# Patient Record
Sex: FEMALE | Race: ASIAN | NOT HISPANIC OR LATINO | Employment: UNEMPLOYED | URBAN - METROPOLITAN AREA
[De-identification: names, ages, dates, MRNs, and addresses within clinical notes are randomized per-mention and may not be internally consistent; named-entity substitution may affect disease eponyms.]

---

## 2017-01-18 ENCOUNTER — ALLSCRIPTS OFFICE VISIT (OUTPATIENT)
Dept: OTHER | Facility: OTHER | Age: 57
End: 2017-01-18

## 2017-02-17 ENCOUNTER — TRANSCRIBE ORDERS (OUTPATIENT)
Dept: LAB | Facility: CLINIC | Age: 57
End: 2017-02-17

## 2017-02-17 ENCOUNTER — APPOINTMENT (OUTPATIENT)
Dept: LAB | Facility: CLINIC | Age: 57
End: 2017-02-17
Payer: COMMERCIAL

## 2017-02-17 DIAGNOSIS — M79.10 MYALGIA: ICD-10-CM

## 2017-02-17 DIAGNOSIS — D50.9 IRON DEFICIENCY ANEMIA: ICD-10-CM

## 2017-02-17 DIAGNOSIS — E78.5 HYPERLIPIDEMIA: ICD-10-CM

## 2017-02-17 LAB
25(OH)D3 SERPL-MCNC: 22.2 NG/ML (ref 30–100)
ALBUMIN SERPL BCP-MCNC: 3.5 G/DL (ref 3.5–5)
ALP SERPL-CCNC: 72 U/L (ref 46–116)
ALT SERPL W P-5'-P-CCNC: 37 U/L (ref 12–78)
ANION GAP SERPL CALCULATED.3IONS-SCNC: 8 MMOL/L (ref 4–13)
AST SERPL W P-5'-P-CCNC: 17 U/L (ref 5–45)
BILIRUB SERPL-MCNC: 0.75 MG/DL (ref 0.2–1)
BUN SERPL-MCNC: 10 MG/DL (ref 5–25)
CALCIUM SERPL-MCNC: 8.8 MG/DL (ref 8.3–10.1)
CHLORIDE SERPL-SCNC: 106 MMOL/L (ref 100–108)
CHOLEST SERPL-MCNC: 223 MG/DL (ref 50–200)
CO2 SERPL-SCNC: 28 MMOL/L (ref 21–32)
CREAT SERPL-MCNC: 0.62 MG/DL (ref 0.6–1.3)
ERYTHROCYTE [DISTWIDTH] IN BLOOD BY AUTOMATED COUNT: 13.4 % (ref 11.6–15.1)
GFR SERPL CREATININE-BSD FRML MDRD: >60 ML/MIN/1.73SQ M
GLUCOSE SERPL-MCNC: 85 MG/DL (ref 65–140)
HCT VFR BLD AUTO: 41.5 % (ref 34.8–46.1)
HDLC SERPL-MCNC: 73 MG/DL (ref 40–60)
HGB BLD-MCNC: 13.8 G/DL (ref 11.5–15.4)
LDLC SERPL CALC-MCNC: 110 MG/DL (ref 0–100)
MCH RBC QN AUTO: 30.7 PG (ref 26.8–34.3)
MCHC RBC AUTO-ENTMCNC: 33.3 G/DL (ref 31.4–37.4)
MCV RBC AUTO: 92 FL (ref 82–98)
PLATELET # BLD AUTO: 225 THOUSANDS/UL (ref 149–390)
PMV BLD AUTO: 9.5 FL (ref 8.9–12.7)
POTASSIUM SERPL-SCNC: 3.4 MMOL/L (ref 3.5–5.3)
PROT SERPL-MCNC: 7.3 G/DL (ref 6.4–8.2)
RBC # BLD AUTO: 4.49 MILLION/UL (ref 3.81–5.12)
SODIUM SERPL-SCNC: 142 MMOL/L (ref 136–145)
TRIGL SERPL-MCNC: 200 MG/DL
TSH SERPL DL<=0.05 MIU/L-ACNC: 2.15 UIU/ML (ref 0.36–3.74)
WBC # BLD AUTO: 4.03 THOUSAND/UL (ref 4.31–10.16)

## 2017-02-17 PROCEDURE — 80061 LIPID PANEL: CPT

## 2017-02-17 PROCEDURE — 36415 COLL VENOUS BLD VENIPUNCTURE: CPT

## 2017-02-17 PROCEDURE — 85027 COMPLETE CBC AUTOMATED: CPT

## 2017-02-17 PROCEDURE — 82306 VITAMIN D 25 HYDROXY: CPT

## 2017-02-17 PROCEDURE — 84443 ASSAY THYROID STIM HORMONE: CPT

## 2017-02-17 PROCEDURE — 80053 COMPREHEN METABOLIC PANEL: CPT

## 2017-02-24 ENCOUNTER — ALLSCRIPTS OFFICE VISIT (OUTPATIENT)
Dept: OTHER | Facility: OTHER | Age: 57
End: 2017-02-24

## 2017-05-24 DIAGNOSIS — S82.892A OTHER FRACTURE OF LEFT LOWER LEG, INITIAL ENCOUNTER FOR CLOSED FRACTURE: ICD-10-CM

## 2017-05-24 DIAGNOSIS — E78.5 HYPERLIPIDEMIA: ICD-10-CM

## 2017-05-24 DIAGNOSIS — C50.919 MALIGNANT NEOPLASM OF FEMALE BREAST (HCC): ICD-10-CM

## 2018-01-14 VITALS
HEIGHT: 63 IN | HEART RATE: 68 BPM | DIASTOLIC BLOOD PRESSURE: 82 MMHG | BODY MASS INDEX: 23.93 KG/M2 | WEIGHT: 135.06 LBS | SYSTOLIC BLOOD PRESSURE: 132 MMHG

## 2018-04-11 ENCOUNTER — OFFICE VISIT (OUTPATIENT)
Dept: CARDIOLOGY CLINIC | Facility: CLINIC | Age: 58
End: 2018-04-11
Payer: COMMERCIAL

## 2018-04-11 VITALS
OXYGEN SATURATION: 97 % | HEIGHT: 63 IN | DIASTOLIC BLOOD PRESSURE: 86 MMHG | WEIGHT: 129.8 LBS | HEART RATE: 90 BPM | SYSTOLIC BLOOD PRESSURE: 132 MMHG | BODY MASS INDEX: 23 KG/M2

## 2018-04-11 DIAGNOSIS — I35.9 AORTIC VALVE DISEASE: ICD-10-CM

## 2018-04-11 DIAGNOSIS — R00.2 PALPITATIONS: Primary | ICD-10-CM

## 2018-04-11 PROCEDURE — 99214 OFFICE O/P EST MOD 30 MIN: CPT | Performed by: INTERNAL MEDICINE

## 2018-04-11 RX ORDER — FAMOTIDINE 20 MG/1
1 TABLET, FILM COATED ORAL DAILY
COMMUNITY
Start: 2016-11-17 | End: 2019-02-14

## 2018-04-11 RX ORDER — TAMOXIFEN CITRATE 20 MG/1
20 TABLET ORAL DAILY
Refills: 2 | COMMUNITY
Start: 2018-03-18 | End: 2019-02-14

## 2018-04-11 RX ORDER — ACETAMINOPHEN 160 MG
1 TABLET,DISINTEGRATING ORAL DAILY
COMMUNITY
Start: 2017-02-17

## 2018-04-11 NOTE — PROGRESS NOTES
Cardiology Follow Up    Alex Wood  1960  [id number]  [address] [address]    1  Palpitations  Holter monitor - 48 hour    Echo complete with contrast if indicated   2  Aortic valve disease  Echo complete with contrast if indicated     Chief Complaint   Patient presents with    Follow-up     routine         Interval History:  Patient presents for routine follow-up  He admits to intermittent palpitations with lasts seconds to minutes  She describes it as heart racing  She denies associated symptoms of shortness of breath, dizziness, lightheadedness, nausea, chest pain  However, she does relate that after episode she will feel flushed  She denies exertional symtpoms     Patient Active Problem List   Diagnosis    Aortic valve disease     Past Medical History:   Diagnosis Date    Anemia     Aortic valve disease     Breast cancer ([facility] [address] )     Cardiac murmur     Hyperlipidemia      Social History     Social History    Marital status: /Civil Union     Spouse name: N/A    Number of children: N/A    Years of education: N/A     Occupational History    Not on file       Social History Main Topics    Smoking status: Never Smoker    Smokeless tobacco: Never Used    Alcohol use Yes      Comment: social    Drug use: No    Sexual activity: Not on file     Other Topics Concern    Not on file     Social History Narrative    No narrative on file      Family History   Problem Relation Age of Onset    Hypertension Father      Past Surgical History:   Procedure Laterality Date    BREAST SURGERY         Current Outpatient Prescriptions:     Cholecalciferol (VITAMIN D3) 2000 units capsule, Take 1 capsule by mouth daily, Disp: , Rfl:     tamoxifen (NOLVADEX) 20 mg tablet, Take 20 mg by mouth daily, Disp: , Rfl: 2    famotidine (PEPCID) 20 mg tablet, Take 1 tablet by mouth daily, Disp: , Rfl:     HYDROcodone-acetaminophen (NORCO) 5-325 mg per tablet, Take 1 tablet by mouth every 6 (six) hours as needed (Not relieved by Anti-inflammatory) for up to 10 doses Max Daily Amount: 4 tablets, Disp: 10 tablet, Rfl: 0  No Known Allergies      Review of Systems:  Review of Systems   Constitutional: Negative for activity change, diaphoresis, fatigue, fever and unexpected weight change  HENT: Negative for nosebleeds and trouble swallowing  Eyes: Negative for visual disturbance  Respiratory: Negative for cough, chest tightness, shortness of breath and wheezing  Cardiovascular: Negative for chest pain, palpitations and leg swelling  Gastrointestinal: Negative for abdominal pain, anal bleeding, blood in stool and nausea  Endocrine: Negative for cold intolerance and heat intolerance  Genitourinary: Negative for decreased urine volume, frequency and hematuria  Musculoskeletal: Negative for myalgias  Skin: Negative for color change and wound  Neurological: Negative for dizziness, syncope, weakness, light-headedness and headaches  Psychiatric/Behavioral: Negative for sleep disturbance  The patient is not nervous/anxious  Physical Exam:  /86   Pulse 90   Ht 5' 3" (1 6 m)   Wt 58 9 kg (129 lb 12 8 oz)   SpO2 97%   BMI 22 99 kg/m²     Physical Exam   Constitutional: She is oriented to person, place, and time  She appears well-developed and well-nourished  No distress  HENT:   Head: Normocephalic and atraumatic  Eyes: Conjunctivae are normal  No scleral icterus  Neck: Neck supple  No JVD present  Cardiovascular: Normal rate and regular rhythm  No murmur heard  Pulmonary/Chest: Effort normal and breath sounds normal  No respiratory distress  She has no wheezes  She has no rales  Abdominal: She exhibits no distension  There is no tenderness  Musculoskeletal: She exhibits no edema  Neurological: She is alert and oriented to person, place, and time  Skin: Skin is warm and dry  No rash noted  She is not diaphoretic  Psychiatric: She has a normal mood and affect  Discussion/Summary:    Palpitations- Possible etiologies discussed  will order 48 hour holter monitor      Aortic valve disease- reported by previous cardiologist  Will order echo to evaluate     Will also obtain CBC, CMP, lipid panel as these have not been checked in the recent past    Follow-up in 1 year

## 2018-04-12 ENCOUNTER — APPOINTMENT (OUTPATIENT)
Dept: LAB | Facility: CLINIC | Age: 58
End: 2018-04-12
Payer: COMMERCIAL

## 2018-04-12 DIAGNOSIS — R00.2 PALPITATIONS: ICD-10-CM

## 2018-04-12 LAB
ALBUMIN SERPL BCP-MCNC: 3.7 G/DL (ref 3.5–5)
ALP SERPL-CCNC: 66 U/L (ref 46–116)
ALT SERPL W P-5'-P-CCNC: 48 U/L (ref 12–78)
ANION GAP SERPL CALCULATED.3IONS-SCNC: 6 MMOL/L (ref 4–13)
AST SERPL W P-5'-P-CCNC: 26 U/L (ref 5–45)
BILIRUB SERPL-MCNC: 0.57 MG/DL (ref 0.2–1)
BUN SERPL-MCNC: 12 MG/DL (ref 5–25)
CALCIUM SERPL-MCNC: 9 MG/DL
CHLORIDE SERPL-SCNC: 106 MMOL/L (ref 100–108)
CHOLEST SERPL-MCNC: 189 MG/DL (ref 50–200)
CO2 SERPL-SCNC: 28 MMOL/L (ref 21–32)
CREAT SERPL-MCNC: 0.58 MG/DL (ref 0.6–1.3)
ERYTHROCYTE [DISTWIDTH] IN BLOOD BY AUTOMATED COUNT: 13.2 % (ref 11.6–15.1)
GFR SERPL CREATININE-BSD FRML MDRD: 101 ML/MIN/1.73SQ M
GLUCOSE P FAST SERPL-MCNC: 89 MG/DL (ref 65–99)
HCT VFR BLD AUTO: 42.2 % (ref 34.8–46.1)
HDLC SERPL-MCNC: 78 MG/DL (ref 40–60)
HGB BLD-MCNC: 13.5 G/DL (ref 11.5–15.4)
LDLC SERPL CALC-MCNC: 88 MG/DL (ref 0–100)
MCH RBC QN AUTO: 30.1 PG (ref 26.8–34.3)
MCHC RBC AUTO-ENTMCNC: 32 G/DL (ref 31.4–37.4)
MCV RBC AUTO: 94 FL (ref 82–98)
NONHDLC SERPL-MCNC: 111 MG/DL
PLATELET # BLD AUTO: 268 THOUSANDS/UL (ref 149–390)
PMV BLD AUTO: 9.7 FL (ref 8.9–12.7)
POTASSIUM SERPL-SCNC: 3.5 MMOL/L (ref 3.5–5.3)
PROT SERPL-MCNC: 7.3 G/DL (ref 6.4–8.2)
RBC # BLD AUTO: 4.49 MILLION/UL (ref 3.81–5.12)
SODIUM SERPL-SCNC: 140 MMOL/L (ref 136–145)
TRIGL SERPL-MCNC: 114 MG/DL
WBC # BLD AUTO: 3.84 THOUSAND/UL (ref 4.31–10.16)

## 2018-04-12 PROCEDURE — 80053 COMPREHEN METABOLIC PANEL: CPT

## 2018-04-12 PROCEDURE — 36415 COLL VENOUS BLD VENIPUNCTURE: CPT | Performed by: INTERNAL MEDICINE

## 2018-04-12 PROCEDURE — 80061 LIPID PANEL: CPT | Performed by: INTERNAL MEDICINE

## 2018-04-12 PROCEDURE — 85027 COMPLETE CBC AUTOMATED: CPT | Performed by: INTERNAL MEDICINE

## 2019-02-14 ENCOUNTER — OFFICE VISIT (OUTPATIENT)
Dept: GASTROENTEROLOGY | Facility: CLINIC | Age: 59
End: 2019-02-14
Payer: COMMERCIAL

## 2019-02-14 ENCOUNTER — APPOINTMENT (OUTPATIENT)
Dept: LAB | Facility: CLINIC | Age: 59
End: 2019-02-14
Payer: COMMERCIAL

## 2019-02-14 VITALS
SYSTOLIC BLOOD PRESSURE: 124 MMHG | DIASTOLIC BLOOD PRESSURE: 90 MMHG | HEIGHT: 63 IN | HEART RATE: 71 BPM | BODY MASS INDEX: 23.04 KG/M2 | WEIGHT: 130 LBS

## 2019-02-14 DIAGNOSIS — R10.11 RUQ ABDOMINAL PAIN: ICD-10-CM

## 2019-02-14 DIAGNOSIS — R10.11 RUQ ABDOMINAL PAIN: Primary | ICD-10-CM

## 2019-02-14 LAB — HEMOCCULT STL QL IA: NEGATIVE

## 2019-02-14 PROCEDURE — 99214 OFFICE O/P EST MOD 30 MIN: CPT | Performed by: INTERNAL MEDICINE

## 2019-02-14 PROCEDURE — G0328 FECAL BLOOD SCRN IMMUNOASSAY: HCPCS

## 2019-02-14 NOTE — PROGRESS NOTES
[facility]      Chief Complaint: RUQ abdominal pain    HPI:  Thierry Smith is a 59 y o   female who presents for a follow-up regarding RUQ pain with negative US gallbladder  If she doesn't have her morning bowel movement, she experiences discomfort  Likewise if she moves her bowels, there is usually no discomfort  No other exacerbating or remitting factors  No positional symptomatology  No nocturnal symptomatology  No exertional symptomatology  Of note, in 2016 we scheduled her for an EGD, but she never showed  Her last colonoscopy was in 2012 and it was normal   No lower GI issues  Negative colonoscopy ion 2012  She denies hematochezia, change in bowel habits, change in stool caliber, nausea, vomiting, melena, weight loss, radiation of the RUQ pain, dysphagia  Review of Systems:   Constitutional: No fever or chills, feels well, no tiredness, no recent weight gain or weight loss  HENT: No complaints of earache, no hearing loss, no nosebleeds, no nasal discharge, no sore throat, no hoarseness  Eyes: No complaints of eye pain, no red eyes, no discharge from eyes, no itchy eyes  Cardiovascular: No complaints of slow heart rate, no fast heart rate, no chest pain, no palpitations, no leg claudication, no lower extremity edema  Respiratory: No complaints of shortness of breath, no wheezing, no cough, no SOB on exertion, no orthopnea  Gastrointestinal: As noted in HPI  Genitourinary: No complaints of dysuria, no incontinence, no hesitancy, no nocturia  Musculoskeletal: No complaints of arthralgia, no myalgias, no joint swelling or stiffness, no limb pain or swelling  Neurological: No complaints of headache, no confusion, no convulsions, no numbness or tingling, no dizziness or fainting, no limb weakness, no difficulty walking  Skin: No complaints of skin rash or skin lesions, no itching, no skin wound, no dry skin      Hematological/Lymphatic: No complaints of swollen glands, does not bleed easy  Allergic/Immunologic: No immunocompromised state  Endocrine:  No complaints of polyuria, no polydipsia  Psychiatric/Behavioral: is not suicidal, no sleep disturbances, no anxiety or depression, no change in personality, no emotional problems  Historical Information   Past Medical History:   Diagnosis Date    Anemia     Aortic valve disease     [facility] [address] [facility]     Cardiac murmur     Hyperlipidemia      Past Surgical History:   Procedure Laterality Date    BREAST SURGERY       Social History   Social History     Substance and Sexual Activity   Alcohol Use Yes    Comment: social     Social History     Substance and Sexual Activity   Drug Use No     Social History     Tobacco Use   Smoking Status Never Smoker   Smokeless Tobacco Never Used     Family History   Problem Relation Age of Onset    Hypertension Father          Current Medications: has a current medication list which includes the following prescription(s): vitamin d3, famotidine, hydrocodone-acetaminophen, and tamoxifen  Vital Signs: There were no vitals taken for this visit  Physical Exam:   Constitutional  General Appearance: No acute distress, well appearing and well nourished  Head  Normocephalic  Eyes  Conjunctivae and lids: No swelling, erythema, or discharge  Pupils and irises: Equal, round and reactive to light  Ears, Nose, Mouth, and Throat  External inspection of ears and nose: Normal  Nasal mucosa, septum and turbinates: Normal without edema or erythema/   Oropharynx: Normal with no erythema, edema, exudate or lesions  Neck  Normal range of motion  Neck supple  Cardiovascular  Auscultation of the heart: Normal rate and rhythm, normal S1 and S2 without murmurs  Examination of the extremities for edema and/or varicosities: Normal  Pulmonary/Chest  Respiratory effort: No increased work of breathing or signs of respiratory distress     Auscultation of lungs: Clear to auscultation, equal breath sounds bilaterally, no wheezes, rales, no rhonchi  Abdomen  Abdomen: Non-tender, no masses  Liver and spleen: No hepatomegaly or splenomegaly  Musculoskeletal  Gait and station: normal   Digits and Nails: normal without clubbing or cyanosis  Inspection/palpation of joints, bones, and muscles: Normal  Neurological  No nystagmus or asterixis  Skin  Skin and subcutaneous tissue: Normal without rashes or lesions  Lymphatic  Palpation of the lymph nodes in neck: No lymphadenopathy  Psychiatric  Orientation to person, place and time: Normal   Mood and affect: Normal          Labs:  Lab Results   Component Value Date    ALT 48 04/12/2018    AST 26 04/12/2018    BUN 12 04/12/2018    CALCIUM 9 0 04/12/2018     04/12/2018    CHOL 216 (H) 03/22/2016    CO2 28 04/12/2018    CREATININE 0 58 (L) 04/12/2018    HDL 78 (H) 04/12/2018    HCT 42 2 04/12/2018    HGB 13 5 04/12/2018     04/12/2018    K 3 5 04/12/2018     03/22/2016    TRIG 114 04/12/2018    WBC 3 84 (L) 04/12/2018         X-Rays & Procedures:   No orders to display           ______________________________________________________________________      Assessment & Plan:       RUQ abdominal pain              I will schedule her for an EGD and and order a FIT test  If she is positive for blood in her stool, I will perform a colonoscopy as well  I told the patient was very important to get the stool test done as soon as possible so we can determine whether colonoscopy needs to be added  She understands  Attestation:  By signing my name below, Ananya Gutierrez, attest that this documentation has been prepared under the direction and in the presence of Mars Asencio MD  Electronically Signed: Riley Mills  2/14/19     I, Mars Asencio, personally performed the services described in this documentation  All medical record entries made by the scribe were at my direction and in my presence   I have reviewed the chart and discharge instructions and agree that the record reflects my personal performance and is accurate and complete   Brenda Sanches MD  2/14/19

## 2019-02-15 PROBLEM — R10.11 RUQ PAIN: Status: ACTIVE | Noted: 2019-02-15

## 2019-02-19 ENCOUNTER — ANESTHESIA EVENT (OUTPATIENT)
Dept: PERIOP | Facility: HOSPITAL | Age: 59
End: 2019-02-19
Payer: COMMERCIAL

## 2019-02-19 ENCOUNTER — HOSPITAL ENCOUNTER (OUTPATIENT)
Facility: HOSPITAL | Age: 59
Setting detail: OUTPATIENT SURGERY
Discharge: HOME | End: 2019-02-19
Attending: INTERNAL MEDICINE | Admitting: INTERNAL MEDICINE
Payer: COMMERCIAL

## 2019-02-19 ENCOUNTER — ANESTHESIA (OUTPATIENT)
Dept: PERIOP | Facility: HOSPITAL | Age: 59
End: 2019-02-19
Payer: COMMERCIAL

## 2019-02-19 VITALS
DIASTOLIC BLOOD PRESSURE: 75 MMHG | OXYGEN SATURATION: 99 % | BODY MASS INDEX: 22.58 KG/M2 | TEMPERATURE: 98.6 F | RESPIRATION RATE: 24 BRPM | WEIGHT: 127.43 LBS | SYSTOLIC BLOOD PRESSURE: 134 MMHG | HEIGHT: 63 IN | HEART RATE: 64 BPM

## 2019-02-19 DIAGNOSIS — R10.11 RUQ PAIN: ICD-10-CM

## 2019-02-19 PROCEDURE — 88342 IMHCHEM/IMCYTCHM 1ST ANTB: CPT | Performed by: PATHOLOGY

## 2019-02-19 PROCEDURE — 45378 DIAGNOSTIC COLONOSCOPY: CPT | Performed by: INTERNAL MEDICINE

## 2019-02-19 PROCEDURE — 88305 TISSUE EXAM BY PATHOLOGIST: CPT | Performed by: PATHOLOGY

## 2019-02-19 RX ORDER — SODIUM CHLORIDE 9 MG/ML
INJECTION, SOLUTION INTRAVENOUS CONTINUOUS PRN
Status: DISCONTINUED | OUTPATIENT
Start: 2019-02-19 | End: 2019-02-19 | Stop reason: SURG

## 2019-02-19 RX ORDER — PROPOFOL 10 MG/ML
INJECTION, EMULSION INTRAVENOUS AS NEEDED
Status: DISCONTINUED | OUTPATIENT
Start: 2019-02-19 | End: 2019-02-19 | Stop reason: SURG

## 2019-02-19 RX ORDER — SODIUM CHLORIDE, SODIUM LACTATE, POTASSIUM CHLORIDE, CALCIUM CHLORIDE 600; 310; 30; 20 MG/100ML; MG/100ML; MG/100ML; MG/100ML
50 INJECTION, SOLUTION INTRAVENOUS CONTINUOUS
Status: DISCONTINUED | OUTPATIENT
Start: 2019-02-19 | End: 2019-02-19 | Stop reason: HOSPADM

## 2019-02-19 RX ORDER — LIDOCAINE HYDROCHLORIDE 10 MG/ML
INJECTION, SOLUTION EPIDURAL; INFILTRATION; INTRACAUDAL; PERINEURAL AS NEEDED
Status: DISCONTINUED | OUTPATIENT
Start: 2019-02-19 | End: 2019-02-19 | Stop reason: SURG

## 2019-02-19 RX ORDER — CHLORAL HYDRATE 500 MG
1000 CAPSULE ORAL DAILY
COMMUNITY

## 2019-02-19 RX ADMIN — PROPOFOL 20 MG: 10 INJECTION, EMULSION INTRAVENOUS at 09:10

## 2019-02-19 RX ADMIN — PROPOFOL 100 MG: 10 INJECTION, EMULSION INTRAVENOUS at 09:08

## 2019-02-19 RX ADMIN — LIDOCAINE HYDROCHLORIDE 20 MG: 10 INJECTION, SOLUTION EPIDURAL; INFILTRATION; INTRACAUDAL; PERINEURAL at 09:08

## 2019-02-19 RX ADMIN — SODIUM CHLORIDE: 0.9 INJECTION, SOLUTION INTRAVENOUS at 09:05

## 2019-02-19 RX ADMIN — SODIUM CHLORIDE, SODIUM LACTATE, POTASSIUM CHLORIDE, AND CALCIUM CHLORIDE 50 ML/HR: .6; .31; .03; .02 INJECTION, SOLUTION INTRAVENOUS at 08:35

## 2019-02-19 NOTE — ANESTHESIA POSTPROCEDURE EVALUATION
Post-Op Assessment Note    CV Status:  Stable  Pain Score: 0    Pain management: adequate     Mental Status:  Lethargic   Hydration Status:  Stable   PONV Controlled:  None   Airway Patency:  Patent and adequate   Post Op Vitals Reviewed: Yes      Staff: CRNA           /74 (02/19/19 0917)    Temp 98 6 °F (37 °C) (02/19/19 0917)    Pulse 71 (02/19/19 0917)   Resp 21 (02/19/19 0917)    SpO2 98 % (02/19/19 0917)

## 2019-02-19 NOTE — ANESTHESIA PREPROCEDURE EVALUATION
Review of Systems/Medical History  Patient summary reviewed  Chart reviewed      Cardiovascular  Exercise tolerance (METS): >4,  Hyperlipidemia,    Pulmonary  Negative pulmonary ROS        GI/Hepatic  Negative GI/hepatic ROS          Negative  ROS        Endo/Other  Negative endo/other ROS      GYN  Negative gynecology ROS          Hematology   Musculoskeletal  Negative musculoskeletal ROS        Neurology  Negative neurology ROS      Psychology   Negative psychology ROS              Physical Exam    Airway    Mallampati score: II         Dental   No notable dental hx     Cardiovascular  Rhythm: regular, Rate: normal,     Pulmonary  Pulmonary exam normal     Other Findings        Anesthesia Plan  ASA Score- 2     Anesthesia Type- IV sedation with anesthesia with ASA Monitors  Additional Monitors:   Airway Plan:         Plan Factors-Patient not instructed to abstain from smoking on day of procedure  Patient did not smoke on day of surgery  Induction- intravenous  Postoperative Plan-     Informed Consent- Anesthetic plan and risks discussed with patient  I personally reviewed this patient with the CRNA  Discussed and agreed on the Anesthesia Plan with the CRNA  Doron Sandhu

## 2019-02-19 NOTE — DISCHARGE INSTR - AVS FIRST PAGE
Postoperative Note  PATIENT NAME: Jesika Sarmiento  : 1960  MRN: [id number]  [facility] ROOM 01    Surgery Date: 2019    POST-OP DIAGNOSIS: See the impression below    SEDATION: Monitored anesthesia care, check anesthesia records    PHYSICAL EXAM:  Vitals:    19 0830   BP: 121/74   Pulse: 71   Resp: 20   Temp: 98 1 °F (36 7 °C)   SpO2: 98%     Body mass index is 22 57 kg/m²  General: NAD  Heart: S1 & S2 normal, RRR  Lungs: CTA, No rales or rhonchi  Abdomen: Soft, nontender, nondistended, good bowel sounds    CONSENT:  Informed consent was obtained for the procedure, including sedation after explaining the risks and benefits of the procedure  Risks including but not limited to bleeding, perforation, infection, aspiration were discussed in detail  Also explained about less than 100%$ sensitivity with the exam and other alternatives  DESCRIPTION:   Procedure:  Esophagogastroduodenoscopy with biopsy    Indications:  78-year-old female with right upper quadrant pain of undetermined etiology    ASA 2    Estimated blood loss: Insignificant    Premedicated with  Mac anesthesia    Patient is identified by me  She is examined prior to the procedure and found to be in stable condition  She is attached to the cardiac monitor and pulse oximeter and placed in the left lateral position  After adequate intravenous sedation the Olympus video gastroscope was inserted under direct vision into esophagus  Esophageal mucosa is completely normal throughout its length with a normal Z-line at 40 cm  The stomach is entered  The body and antrum normal   Pylorus is normal   The duodenum was cannulated into the 3rd portion and is normal   Retroversion reveals a normal GE junction fundus and cardia  Biopsies taken of the antrum body and fundus with excellent hemostasis  She tolerated the procedure well and was stable throughout      My impression:    Normal upper GI endoscopy, status post biopsy    RECOMMENDATIONS:  Follow up biopsy results in 1 week    COMPLICATIONS:  None; patient tolerated the procedure well  DISPOSITION: PACU  CONDITION: Stable    Melanie Flynn MD  2/19/2019,9:13 AM        Portions of the record may have been created with voice recognition software   Occasional wrong word or "sound a like" substitutions may have occurred due to the inherent limitations of voice recognition software   Read the chart carefully and recognize, using context, where substitutions have occurred

## 2019-02-19 NOTE — DISCHARGE INSTRUCTIONS
Upper Endoscopy   WHAT YOU NEED TO KNOW:   An upper endoscopy is also called an upper gastrointestinal (GI) endoscopy, or an esophagogastroduodenoscopy (EGD)  You may feel bloated, gassy, or have some abdominal discomfort after your procedure  Your throat may be sore for 24 to 36 hours  You may burp or pass gas from air that is still inside your body  DISCHARGE INSTRUCTIONS:   Call 911 for any of the following:   · You have sudden chest pain or trouble breathing  Seek care immediately if:   · You feel dizzy or faint  · You have trouble swallowing  · Your bowel movements are very dark or black  · Your abdomen is hard and firm and you have severe pain  · You vomit blood  Contact your healthcare provider if:   · You feel full or bloated and cannot burp or pass gas  · You have not had a bowel movement for 3 days after your procedure  · You have neck pain  · You have a fever or chills  · You have nausea or are vomiting  · You have a rash or hives  · You have questions or concerns about your endoscopy  Relieve a sore throat:  Suck on throat lozenges or crushed ice  Gargle with a small amount of warm salt water  Mix 1 teaspoon of salt and 1 cup of warm water to make salt water  Relieve gas and discomfort from bloating:  Lie on your right side with a heating pad on your abdomen  Take short walks to help pass gas  Eat small meals until bloating is relieved  Rest after your procedure: You have been given medicine to relax you  Do not  drive or make important decisions until the day after your procedure  Return to your normal activity as directed  You can usually return to work the day after your procedure  Follow up with your healthcare provider as directed:  Write down your questions so you remember to ask them during your visits     © 2017 [address] is for End User's use only and may not be sold, redistributed or otherwise used for commercial purposes  All illustrations and images included in CareNotes® are the copyrighted property of A D A M , Inc  or [employer]  The above information is an  only  It is not intended as medical advice for individual conditions or treatments  Talk to your doctor, nurse or pharmacist before following any medical regimen to see if it is safe and effective for you

## 2019-02-19 NOTE — OP NOTE
Postoperative Note  PATIENT NAME: Patrick Viveros  : 1960  MRN: [id number]  [facility] ROOM 01    Surgery Date: 2019    POST-OP DIAGNOSIS: See the impression below    SEDATION: Monitored anesthesia care, check anesthesia records    PHYSICAL EXAM:  Vitals:    19 0830   BP: 121/74   Pulse: 71   Resp: 20   Temp: 98 1 °F (36 7 °C)   SpO2: 98%     Body mass index is 22 57 kg/m²  General: NAD  Heart: S1 & S2 normal, RRR  Lungs: CTA, No rales or rhonchi  Abdomen: Soft, nontender, nondistended, good bowel sounds    CONSENT:  Informed consent was obtained for the procedure, including sedation after explaining the risks and benefits of the procedure  Risks including but not limited to bleeding, perforation, infection, aspiration were discussed in detail  Also explained about less than 100%$ sensitivity with the exam and other alternatives  DESCRIPTION:   Procedure:  Esophagogastroduodenoscopy with biopsy    Indications:  68-year-old female with right upper quadrant pain of undetermined etiology    ASA 2    Estimated blood loss: Insignificant    Premedicated with  Mac anesthesia    Patient is identified by me  She is examined prior to the procedure and found to be in stable condition  She is attached to the cardiac monitor and pulse oximeter and placed in the left lateral position  After adequate intravenous sedation the Olympus video gastroscope was inserted under direct vision into esophagus  Esophageal mucosa is completely normal throughout its length with a normal Z-line at 40 cm  The stomach is entered  The body and antrum normal   Pylorus is normal   The duodenum was cannulated into the 3rd portion and is normal   Retroversion reveals a normal GE junction fundus and cardia  Biopsies taken of the antrum body and fundus with excellent hemostasis  She tolerated the procedure well and was stable throughout      My impression:    Normal upper GI endoscopy, status post biopsy    RECOMMENDATIONS:  Follow up biopsy results in 1 week    COMPLICATIONS:  None; patient tolerated the procedure well  DISPOSITION: PACU  CONDITION: Stable    Mars Asencio MD  2/19/2019,9:13 AM        Portions of the record may have been created with voice recognition software   Occasional wrong word or "sound a like" substitutions may have occurred due to the inherent limitations of voice recognition software   Read the chart carefully and recognize, using context, where substitutions have occurred

## 2019-04-26 ENCOUNTER — OFFICE VISIT (OUTPATIENT)
Dept: OBGYN CLINIC | Facility: CLINIC | Age: 59
End: 2019-04-26
Payer: COMMERCIAL

## 2019-04-26 VITALS
WEIGHT: 132.6 LBS | DIASTOLIC BLOOD PRESSURE: 82 MMHG | SYSTOLIC BLOOD PRESSURE: 126 MMHG | HEIGHT: 63 IN | HEART RATE: 67 BPM | BODY MASS INDEX: 23.5 KG/M2

## 2019-04-26 DIAGNOSIS — M25.552 LEFT HIP PAIN: Primary | ICD-10-CM

## 2019-04-26 DIAGNOSIS — S76.012A HIP STRAIN, LEFT, INITIAL ENCOUNTER: ICD-10-CM

## 2019-04-26 PROCEDURE — 99203 OFFICE O/P NEW LOW 30 MIN: CPT | Performed by: ORTHOPAEDIC SURGERY

## 2019-05-17 ENCOUNTER — EVALUATION (OUTPATIENT)
Dept: PHYSICAL THERAPY | Facility: CLINIC | Age: 59
End: 2019-05-17
Payer: COMMERCIAL

## 2019-05-17 DIAGNOSIS — S76.012A HIP STRAIN, LEFT, INITIAL ENCOUNTER: ICD-10-CM

## 2019-05-17 PROCEDURE — G8979 MOBILITY GOAL STATUS: HCPCS | Performed by: PHYSICAL THERAPIST

## 2019-05-17 PROCEDURE — G8978 MOBILITY CURRENT STATUS: HCPCS | Performed by: PHYSICAL THERAPIST

## 2019-05-17 PROCEDURE — 97161 PT EVAL LOW COMPLEX 20 MIN: CPT | Performed by: PHYSICAL THERAPIST

## 2019-05-20 ENCOUNTER — OFFICE VISIT (OUTPATIENT)
Dept: PHYSICAL THERAPY | Facility: CLINIC | Age: 59
End: 2019-05-20
Payer: COMMERCIAL

## 2019-05-20 DIAGNOSIS — S76.012A HIP STRAIN, LEFT, INITIAL ENCOUNTER: Primary | ICD-10-CM

## 2019-05-20 PROCEDURE — 97110 THERAPEUTIC EXERCISES: CPT | Performed by: PHYSICAL THERAPIST

## 2019-05-20 PROCEDURE — 97140 MANUAL THERAPY 1/> REGIONS: CPT | Performed by: PHYSICAL THERAPIST

## 2019-05-20 PROCEDURE — 97112 NEUROMUSCULAR REEDUCATION: CPT | Performed by: PHYSICAL THERAPIST

## 2019-05-22 ENCOUNTER — APPOINTMENT (OUTPATIENT)
Dept: PHYSICAL THERAPY | Facility: CLINIC | Age: 59
End: 2019-05-22
Payer: COMMERCIAL

## 2019-05-23 ENCOUNTER — APPOINTMENT (OUTPATIENT)
Dept: PHYSICAL THERAPY | Facility: CLINIC | Age: 59
End: 2019-05-23
Payer: COMMERCIAL

## 2019-05-28 ENCOUNTER — OFFICE VISIT (OUTPATIENT)
Dept: PHYSICAL THERAPY | Facility: CLINIC | Age: 59
End: 2019-05-28
Payer: COMMERCIAL

## 2019-05-28 DIAGNOSIS — S76.012A HIP STRAIN, LEFT, INITIAL ENCOUNTER: Primary | ICD-10-CM

## 2019-05-28 PROCEDURE — 97140 MANUAL THERAPY 1/> REGIONS: CPT | Performed by: PHYSICAL THERAPIST

## 2019-05-28 PROCEDURE — 97112 NEUROMUSCULAR REEDUCATION: CPT | Performed by: PHYSICAL THERAPIST

## 2019-05-28 PROCEDURE — 97110 THERAPEUTIC EXERCISES: CPT | Performed by: PHYSICAL THERAPIST

## 2019-05-29 ENCOUNTER — OFFICE VISIT (OUTPATIENT)
Dept: PHYSICAL THERAPY | Facility: CLINIC | Age: 59
End: 2019-05-29
Payer: COMMERCIAL

## 2019-05-29 DIAGNOSIS — S76.012A HIP STRAIN, LEFT, INITIAL ENCOUNTER: Primary | ICD-10-CM

## 2019-05-29 PROCEDURE — 97110 THERAPEUTIC EXERCISES: CPT

## 2019-05-29 PROCEDURE — 97112 NEUROMUSCULAR REEDUCATION: CPT

## 2019-06-13 ENCOUNTER — OFFICE VISIT (OUTPATIENT)
Dept: PHYSICAL THERAPY | Facility: CLINIC | Age: 59
End: 2019-06-13
Payer: COMMERCIAL

## 2019-06-13 DIAGNOSIS — S76.012A HIP STRAIN, LEFT, INITIAL ENCOUNTER: Primary | ICD-10-CM

## 2019-06-13 PROCEDURE — 97112 NEUROMUSCULAR REEDUCATION: CPT | Performed by: PHYSICAL THERAPIST

## 2019-06-13 PROCEDURE — 97140 MANUAL THERAPY 1/> REGIONS: CPT | Performed by: PHYSICAL THERAPIST

## 2019-06-13 PROCEDURE — 97110 THERAPEUTIC EXERCISES: CPT | Performed by: PHYSICAL THERAPIST

## 2019-06-14 ENCOUNTER — APPOINTMENT (OUTPATIENT)
Dept: PHYSICAL THERAPY | Facility: CLINIC | Age: 59
End: 2019-06-14
Payer: COMMERCIAL

## 2019-06-20 PROCEDURE — G8978 MOBILITY CURRENT STATUS: HCPCS | Performed by: PHYSICAL THERAPIST

## 2019-06-20 PROCEDURE — G8979 MOBILITY GOAL STATUS: HCPCS | Performed by: PHYSICAL THERAPIST

## 2021-06-15 ENCOUNTER — OFFICE VISIT (OUTPATIENT)
Dept: INTERNAL MEDICINE CLINIC | Facility: CLINIC | Age: 61
End: 2021-06-15
Payer: COMMERCIAL

## 2021-06-15 VITALS
SYSTOLIC BLOOD PRESSURE: 140 MMHG | TEMPERATURE: 97.6 F | HEIGHT: 63 IN | RESPIRATION RATE: 18 BRPM | BODY MASS INDEX: 23.21 KG/M2 | DIASTOLIC BLOOD PRESSURE: 80 MMHG | WEIGHT: 131 LBS | OXYGEN SATURATION: 98 % | HEART RATE: 68 BPM

## 2021-06-15 DIAGNOSIS — E78.5 HYPERLIPIDEMIA, UNSPECIFIED HYPERLIPIDEMIA TYPE: ICD-10-CM

## 2021-06-15 DIAGNOSIS — Z13.6 ENCOUNTER FOR SCREENING FOR CARDIOVASCULAR DISORDERS: ICD-10-CM

## 2021-06-15 DIAGNOSIS — Z12.11 ENCOUNTER FOR SCREENING FOR MALIGNANT NEOPLASM OF COLON: Primary | ICD-10-CM

## 2021-06-15 DIAGNOSIS — R73.03 PREDIABETES: ICD-10-CM

## 2021-06-15 DIAGNOSIS — Z79.810 LONG-TERM CURRENT USE OF TAMOXIFEN: ICD-10-CM

## 2021-06-15 DIAGNOSIS — R00.2 PALPITATIONS: ICD-10-CM

## 2021-06-15 DIAGNOSIS — R10.9 RECURRENT ABDOMINAL PAIN: ICD-10-CM

## 2021-06-15 PROCEDURE — 3725F SCREEN DEPRESSION PERFORMED: CPT | Performed by: INTERNAL MEDICINE

## 2021-06-15 PROCEDURE — 1036F TOBACCO NON-USER: CPT | Performed by: INTERNAL MEDICINE

## 2021-06-15 PROCEDURE — 99213 OFFICE O/P EST LOW 20 MIN: CPT | Performed by: INTERNAL MEDICINE

## 2021-06-15 PROCEDURE — 3008F BODY MASS INDEX DOCD: CPT | Performed by: INTERNAL MEDICINE

## 2021-06-15 RX ORDER — TAMOXIFEN CITRATE 20 MG/1
20 TABLET ORAL 2 TIMES DAILY
COMMUNITY
End: 2022-06-16

## 2021-06-15 NOTE — PROGRESS NOTES
Assessment/Plan:    No problem-specific Assessment & Plan notes found for this encounter  There are no diagnoses linked to this encounter  Subjective:      Patient ID: Berta Drake is a 56 y o  female  HPI    The following portions of the patient's history were reviewed and updated as appropriate:   She  has a past medical history of Anemia, Aortic valve disease, Breast cancer ([address] ), Cardiac murmur, and Hyperlipidemia  She   Patient Active Problem List    Diagnosis Date Noted    RUQ pain 02/15/2019    Aortic valve disease 04/11/2018    Palpitations 04/11/2018     She  has a past surgical history that includes Breast surgery and pr esophagogastroduodenoscopy transoral diagnostic (N/A, 2/19/2019)  Her family history includes Hypertension in her father; No Known Problems in her mother  She  reports that she has never smoked  She has never used smokeless tobacco  She reports previous alcohol use  She reports that she does not use drugs  Current Outpatient Medications   Medication Sig Dispense Refill    Cholecalciferol (VITAMIN D3) 2000 units capsule Take 1 capsule by mouth daily      Omega-3 Fatty Acids (FISH OIL) 1,000 mg Take 1,000 mg by mouth daily       No current facility-administered medications for this visit  Current Outpatient Medications on File Prior to Visit   Medication Sig    Cholecalciferol (VITAMIN D3) 2000 units capsule Take 1 capsule by mouth daily    Omega-3 Fatty Acids (FISH OIL) 1,000 mg Take 1,000 mg by mouth daily     No current facility-administered medications on file prior to visit  She has No Known Allergies       Review of Systems      Objective:      /80   Pulse 68   Temp 97 6 °F (36 4 °C)   Resp 18   Ht 5' 2 5" (1 588 m)   Wt 59 4 kg (131 lb)   SpO2 98%   BMI 23 58 kg/m²          Physical Exam

## 2021-06-16 ENCOUNTER — TELEPHONE (OUTPATIENT)
Dept: ADMINISTRATIVE | Facility: OTHER | Age: 61
End: 2021-06-16

## 2021-06-16 NOTE — LETTER
2nd request    Procedure Request Form: Mammogram      Date Requested: 21  Patient: Wilson Freed  Patient : 1960   Referring Provider: Scarlet Cannon MD        Date of Procedure ______________________________       The above patient has informed us that they have completed their   most recent Mammogram at your facility  Please complete   this form and attach all corresponding procedure reports/results  Comments __________________________________________________________  ____________________________________________________________________  ____________________________________________________________________  ____________________________________________________________________    Facility Completing Procedure _________________________________________    Form Completed By (print name) _______________________________________      Signature __________________________________________________________      These reports are needed for  compliance    Please fax this completed form and a copy of the procedure report to our office located at [address] [address] as soon as possible to [phone] attention Alcira: Phone [phone]    We thank you for your assistance in treating our mutual patient

## 2021-06-16 NOTE — TELEPHONE ENCOUNTER
----- Message from Vasyl Meade sent at 6/15/2021 10:23 AM EDT -----  Regarding: mammogram  06/15/21 10:23 AM    Hello, our patient Sarah Phillips has had Mammogram completed/performed   Please assist in updating the patient chart by making an External outreach to [address] [place] [address] located in [place] The date of service is 9/2020  Thank you,  Vasyl [facility] Thompson

## 2021-06-16 NOTE — TELEPHONE ENCOUNTER
Upon review of the In Basket request and the patient's chart, initial outreach has been made via fax, please see Contacts section for details       Thank you  Darcy Stewart MA

## 2021-06-18 ENCOUNTER — APPOINTMENT (OUTPATIENT)
Dept: LAB | Facility: HOSPITAL | Age: 61
End: 2021-06-18
Attending: INTERNAL MEDICINE
Payer: COMMERCIAL

## 2021-06-18 DIAGNOSIS — E78.5 HYPERLIPIDEMIA, UNSPECIFIED HYPERLIPIDEMIA TYPE: ICD-10-CM

## 2021-06-18 DIAGNOSIS — Z13.6 ENCOUNTER FOR SCREENING FOR CARDIOVASCULAR DISORDERS: ICD-10-CM

## 2021-06-18 DIAGNOSIS — R00.2 PALPITATIONS: ICD-10-CM

## 2021-06-18 DIAGNOSIS — Z79.810 LONG-TERM CURRENT USE OF TAMOXIFEN: ICD-10-CM

## 2021-06-18 DIAGNOSIS — R73.03 PREDIABETES: ICD-10-CM

## 2021-06-18 LAB
ALBUMIN SERPL BCP-MCNC: 3.5 G/DL (ref 3.5–5)
ALP SERPL-CCNC: 69 U/L (ref 46–116)
ALT SERPL W P-5'-P-CCNC: 34 U/L (ref 12–78)
ANION GAP SERPL CALCULATED.3IONS-SCNC: 9 MMOL/L (ref 4–13)
AST SERPL W P-5'-P-CCNC: 25 U/L (ref 5–45)
BASOPHILS # BLD AUTO: 0.03 THOUSANDS/ΜL (ref 0–0.1)
BASOPHILS NFR BLD AUTO: 1 % (ref 0–1)
BILIRUB SERPL-MCNC: 0.67 MG/DL (ref 0.2–1)
BUN SERPL-MCNC: 11 MG/DL (ref 5–25)
CALCIUM SERPL-MCNC: 8.8 MG/DL (ref 8.3–10.1)
CHLORIDE SERPL-SCNC: 106 MMOL/L (ref 100–108)
CHOLEST SERPL-MCNC: 215 MG/DL (ref 50–200)
CO2 SERPL-SCNC: 27 MMOL/L (ref 21–32)
CREAT SERPL-MCNC: 0.69 MG/DL (ref 0.6–1.3)
EOSINOPHIL # BLD AUTO: 0.1 THOUSAND/ΜL (ref 0–0.61)
EOSINOPHIL NFR BLD AUTO: 2 % (ref 0–6)
ERYTHROCYTE [DISTWIDTH] IN BLOOD BY AUTOMATED COUNT: 13.2 % (ref 11.6–15.1)
EST. AVERAGE GLUCOSE BLD GHB EST-MCNC: 108 MG/DL
GFR SERPL CREATININE-BSD FRML MDRD: 94 ML/MIN/1.73SQ M
GLUCOSE P FAST SERPL-MCNC: 89 MG/DL (ref 65–99)
HBA1C MFR BLD: 5.4 %
HCT VFR BLD AUTO: 40.8 % (ref 34.8–46.1)
HDLC SERPL-MCNC: 78 MG/DL
HGB BLD-MCNC: 13 G/DL (ref 11.5–15.4)
IMM GRANULOCYTES # BLD AUTO: 0.01 THOUSAND/UL (ref 0–0.2)
IMM GRANULOCYTES NFR BLD AUTO: 0 % (ref 0–2)
LDLC SERPL CALC-MCNC: 118 MG/DL (ref 0–100)
LYMPHOCYTES # BLD AUTO: 1.74 THOUSANDS/ΜL (ref 0.6–4.47)
LYMPHOCYTES NFR BLD AUTO: 41 % (ref 14–44)
MCH RBC QN AUTO: 29.7 PG (ref 26.8–34.3)
MCHC RBC AUTO-ENTMCNC: 31.9 G/DL (ref 31.4–37.4)
MCV RBC AUTO: 93 FL (ref 82–98)
MONOCYTES # BLD AUTO: 0.26 THOUSAND/ΜL (ref 0.17–1.22)
MONOCYTES NFR BLD AUTO: 6 % (ref 4–12)
NEUTROPHILS # BLD AUTO: 2.11 THOUSANDS/ΜL (ref 1.85–7.62)
NEUTS SEG NFR BLD AUTO: 50 % (ref 43–75)
NONHDLC SERPL-MCNC: 137 MG/DL
NRBC BLD AUTO-RTO: 0 /100 WBCS
PLATELET # BLD AUTO: 248 THOUSANDS/UL (ref 149–390)
PMV BLD AUTO: 9.7 FL (ref 8.9–12.7)
POTASSIUM SERPL-SCNC: 3.7 MMOL/L (ref 3.5–5.3)
PROT SERPL-MCNC: 6.8 G/DL (ref 6.4–8.2)
RBC # BLD AUTO: 4.38 MILLION/UL (ref 3.81–5.12)
SODIUM SERPL-SCNC: 142 MMOL/L (ref 136–145)
TRIGL SERPL-MCNC: 97 MG/DL
TSH SERPL DL<=0.05 MIU/L-ACNC: 1.46 UIU/ML (ref 0.36–3.74)
WBC # BLD AUTO: 4.25 THOUSAND/UL (ref 4.31–10.16)

## 2021-06-18 PROCEDURE — 83036 HEMOGLOBIN GLYCOSYLATED A1C: CPT

## 2021-06-18 PROCEDURE — 36415 COLL VENOUS BLD VENIPUNCTURE: CPT

## 2021-06-18 PROCEDURE — 84443 ASSAY THYROID STIM HORMONE: CPT

## 2021-06-18 PROCEDURE — 80053 COMPREHEN METABOLIC PANEL: CPT

## 2021-06-18 PROCEDURE — 85025 COMPLETE CBC W/AUTO DIFF WBC: CPT

## 2021-06-18 PROCEDURE — 80061 LIPID PANEL: CPT

## 2021-06-23 NOTE — TELEPHONE ENCOUNTER
As a follow-up, a second attempt has been made for outreach via fax, please see Contacts section for details      Thank you  Darcy Stewart MA

## 2021-06-28 NOTE — TELEPHONE ENCOUNTER
As a final attempt, a third outreach has been made via telephone call  Please see Contacts section for details  This encounter will be closed and completed by end of day  Should we receive the requested information because of previous outreach attempts, the requested patient's chart will be updated appropriately  I spoke with Michelle Wolf      Thank you  Elis Damian MA

## 2021-07-06 ENCOUNTER — TELEPHONE (OUTPATIENT)
Dept: INTERNAL MEDICINE CLINIC | Facility: CLINIC | Age: 61
End: 2021-07-06

## 2021-07-06 NOTE — TELEPHONE ENCOUNTER
Dr Adarsh Thornton note indicates patient gets yearly mammogram's done in [place]   Please obtain report

## 2021-07-07 ENCOUNTER — TELEPHONE (OUTPATIENT)
Dept: ADMINISTRATIVE | Facility: OTHER | Age: 61
End: 2021-07-07

## 2021-07-07 NOTE — LETTER
3rd request    Procedure Request Form: Mammogram      Date Requested: 21  Patient: Zuleima Generous  Patient : 1960   Referring Provider: Tadeo Jones, MD        Date of Procedure ______________________________       The above patient has informed us that they have completed their   most recent Mammogram at your facility  Please complete   this form and attach all corresponding procedure reports/results  Comments __________________________________________________________  ____________________________________________________________________  ____________________________________________________________________  ____________________________________________________________________    Facility Completing Procedure _________________________________________    Form Completed By (print name) _______________________________________      Signature __________________________________________________________      These reports are needed for  compliance    Please fax this completed form and a copy of the procedure report to our office located at [place] [address] as soon as possible to [phone] attention Alcira: Phone [phone]    We thank you for your assistance in treating our mutual patient

## 2021-07-07 NOTE — LETTER
Procedure Request Form: Mammogram      Date Requested: 21  Patient: Orie Forge  Patient : 1960   Referring Provider: Lucy Humphrey, MD        Date of Procedure ______________________________       The above patient has informed us that they have completed their   most recent Mammogram at your facility  Please complete   this form and attach all corresponding procedure reports/results  Comments __________________________________________________________  ____________________________________________________________________  ____________________________________________________________________  ____________________________________________________________________    Facility Completing Procedure _________________________________________    Form Completed By (print name) _______________________________________      Signature __________________________________________________________      These reports are needed for  compliance    Please fax this completed form and a copy of the procedure report to our office located at [place] [address] as soon as possible to [phone] attention Alcira: Phone [phone]    We thank you for your assistance in treating our mutual patient

## 2021-07-07 NOTE — TELEPHONE ENCOUNTER
----- Message from Giuseppe Cadet sent at 7/6/2021  1:56 PM EDT -----  Regarding: Mammogram  07/06/21 1:56 PM    Hello, our patient Génesis Holley has had Mammogram completed/performed  Please assist in updating the patient chart by making an External outreach to [facility] facility located in [place]   The date of service is 2/2021    Thank you,  Giuseppe Cadet  [facility] Shraddha Velazquez

## 2021-07-07 NOTE — LETTER
Procedure Request Form: Mammogram      Date Requested: 21  Patient: Jose L [place]  Patient : 1960   Referring Provider: Farnaz Morgan, MD        Date of Procedure ______________________________       The above patient has informed us that they have completed their   most recent Mammogram at your facility  Please complete   this form and attach all corresponding procedure reports/results  Comments __________________________________________________________  ____________________________________________________________________  ____________________________________________________________________  ____________________________________________________________________    Facility Completing Procedure _________________________________________    Form Completed By (print name) _______________________________________      Signature __________________________________________________________      These reports are needed for  compliance    Please fax this completed form and a copy of the procedure report to our office located at [place] [address] as soon as possible to [phone] gina Eli: Phone [phone]    We thank you for your assistance in treating our mutual patient

## 2021-07-12 NOTE — TELEPHONE ENCOUNTER
Upon review of the In Basket request and the patient's chart, initial outreach has been made via telephone call  The outcome of the telephone call was a fax request form to be sent to Office, please see Contacts section for details       Thank you  Arline Quinonez MA

## 2021-07-14 NOTE — TELEPHONE ENCOUNTER
As a follow-up, a second attempt has been made for outreach via fax, please see Contacts section for details      Thank you  Christina Pruitt MA

## 2021-07-16 ENCOUNTER — OFFICE VISIT (OUTPATIENT)
Dept: GASTROENTEROLOGY | Facility: CLINIC | Age: 61
End: 2021-07-16
Payer: COMMERCIAL

## 2021-07-16 VITALS
SYSTOLIC BLOOD PRESSURE: 122 MMHG | WEIGHT: 133 LBS | BODY MASS INDEX: 23.57 KG/M2 | RESPIRATION RATE: 18 BRPM | DIASTOLIC BLOOD PRESSURE: 80 MMHG | HEIGHT: 63 IN

## 2021-07-16 DIAGNOSIS — R10.31 RIGHT LOWER QUADRANT ABDOMINAL PAIN: Primary | ICD-10-CM

## 2021-07-16 PROCEDURE — 99213 OFFICE O/P EST LOW 20 MIN: CPT | Performed by: INTERNAL MEDICINE

## 2021-07-16 PROCEDURE — 3008F BODY MASS INDEX DOCD: CPT | Performed by: INTERNAL MEDICINE

## 2021-07-16 NOTE — PROGRESS NOTES
[facility]      Chief Complaint:   Right lower quadrant pain    HPI:  Maris Collier is a 56 y o   female who presents with  Or than 1 year of right lower quadrant pain  This is mild and intermittent  No definitive exacerbating factors  It is relieved when she has a bowel movement  Patient notes that she does eat a lot of garlic and onion  Last colonoscopy 2012  No alarm symptomatology  No melena hematochezia or rectal bleeding  No weight loss fever or chills  No change in bowel habits or stool caliber  Her GERD is under excellent control with only very rare heartburn  No upper abdominal symptomatology  Review of Systems:   Constitutional: No fever or chills, feels well, no tiredness, no recent weight gain or weight loss  HENT: No complaints of earache, no hearing loss, no nosebleeds, no nasal discharge, no sore throat, no hoarseness  Eyes: No complaints of eye pain, no red eyes, no discharge from eyes, no itchy eyes  Cardiovascular: No complaints of slow heart rate, no fast heart rate, no chest pain, no palpitations, no leg claudication, no lower extremity edema  Respiratory: No complaints of shortness of breath, no wheezing, no cough, no SOB on exertion, no orthopnea  Gastrointestinal: As noted in HPI  Genitourinary: No complaints of dysuria, no incontinence, no hesitancy, no nocturia  Musculoskeletal: No complaints of arthralgia, no myalgias, no joint swelling or stiffness, no limb pain or swelling  Neurological: No complaints of headache, no confusion, no convulsions, no numbness or tingling, no dizziness or fainting, no limb weakness, no difficulty walking  Skin: No complaints of skin rash or skin lesions, no itching, no skin wound, no dry skin  Hematological/Lymphatic: No complaints of swollen glands, does not bleed easy  Allergic/Immunologic: No immunocompromised state  Endocrine:  No complaints of polyuria, no polydipsia  Psychiatric/Behavioral: is not suicidal, no sleep disturbances, no anxiety or depression, no change in personality, no emotional problems  Historical Information   Past Medical History:   Diagnosis Date    Anemia     Aortic valve disease     Breast cancer ([address] )     Cardiac murmur     Hyperlipidemia      Past Surgical History:   Procedure Laterality Date    BREAST SURGERY      lumpectomy    [place] ESOPHAGOGASTRODUODENOSCOPY TRANSORAL DIAGNOSTIC N/A 2/19/2019    Procedure: ESOPHAGOGASTRODUODENOSCOPY (EGD); Surgeon: Mouna Stephen MD;  Location: [facility]; Service: Gastroenterology     Social History   Social History     Substance and Sexual Activity   Alcohol Use Not Currently    Comment: social     Social History     Substance and Sexual Activity   Drug Use No     Social History     Tobacco Use   Smoking Status Never Smoker   Smokeless Tobacco Never Used     Family History   Problem Relation Age of Onset    Hypertension Father     No Known Problems Mother          Current Medications: has a current medication list which includes the following prescription(s): vitamin d3, fish oil, and tamoxifen  Vital Signs: /80   Resp 18   Ht 5' 2 5" (1 588 m)   Wt 60 3 kg (133 lb)   BMI 23 94 kg/m²       Physical Exam:   Constitutional  General Appearance: No acute distress, well appearing and well nourished  Head  Normocephalic  Eyes  Conjunctivae and lids: No swelling, erythema, or discharge  Pupils and irises: Equal, round and reactive to light  Ears, Nose, Mouth, and Throat  External inspection of ears and nose: Normal  Nasal mucosa, septum and turbinates: Normal without edema or erythema/   Oropharynx: Normal with no erythema, edema, exudate or lesions  Neck  Normal range of motion  Neck supple  Cardiovascular  Auscultation of the heart: Normal rate and rhythm, normal S1 and S2 without murmurs    Examination of the extremities for edema and/or varicosities: Normal  Pulmonary/Chest  Respiratory effort: No increased work of breathing or signs of respiratory distress  Auscultation of lungs: Clear to auscultation, equal breath sounds bilaterally, no wheezes, rales, no rhonchi  Abdomen  Abdomen: Non-tender, no masses  Liver and spleen: No hepatomegaly or splenomegaly  Musculoskeletal  Gait and station: normal   Digits and Nails: normal without clubbing or cyanosis  Inspection/palpation of joints, bones, and muscles: Normal  Neurological  No nystagmus or asterixis  Skin  Skin and subcutaneous tissue: Normal without rashes or lesions  Lymphatic  Palpation of the lymph nodes in neck: No lymphadenopathy  Psychiatric  Orientation to person, place and time: Normal   Mood and affect: Normal          Labs:  Lab Results   Component Value Date    ALT 34 06/18/2021    AST 25 06/18/2021    BUN 11 06/18/2021    CALCIUM 8 8 06/18/2021     06/18/2021    CHOL 216 (H) 03/22/2016    CO2 27 06/18/2021    CREATININE 0 69 06/18/2021    HDL 78 06/18/2021    HCT 40 8 06/18/2021    HGB 13 0 06/18/2021    HGBA1C 5 4 06/18/2021     06/18/2021    K 3 7 06/18/2021     03/22/2016    TRIG 97 06/18/2021    WBC 4 25 (L) 06/18/2021         X-Rays & Procedures:   No orders to display           ______________________________________________________________________      Assessment & Plan:     Diagnoses and all orders for this visit:    Right lower quadrant abdominal pain  -     Colonoscopy; Future        Patient will be scheduled for colonoscopy  Further recommendations will depend on study results

## 2021-07-21 NOTE — TELEPHONE ENCOUNTER
As a final attempt, a third outreach has been made via fax  Please see Contacts section for details  This encounter will be closed and completed by end of day  Should we receive the requested information because of previous outreach attempts, the requested patient's chart will be updated appropriately       Thank you  Jewels Santiago MA

## 2021-08-27 ENCOUNTER — TELEPHONE (OUTPATIENT)
Dept: GASTROENTEROLOGY | Facility: HOSPITAL | Age: 61
End: 2021-08-27

## 2021-08-30 ENCOUNTER — ANESTHESIA (OUTPATIENT)
Dept: GASTROENTEROLOGY | Facility: HOSPITAL | Age: 61
End: 2021-08-30

## 2021-08-30 ENCOUNTER — ANESTHESIA EVENT (OUTPATIENT)
Dept: GASTROENTEROLOGY | Facility: HOSPITAL | Age: 61
End: 2021-08-30

## 2021-08-30 ENCOUNTER — HOSPITAL ENCOUNTER (OUTPATIENT)
Dept: GASTROENTEROLOGY | Facility: HOSPITAL | Age: 61
Setting detail: OUTPATIENT SURGERY
Discharge: HOME | End: 2021-08-30
Attending: INTERNAL MEDICINE | Admitting: INTERNAL MEDICINE
Payer: COMMERCIAL

## 2021-08-30 VITALS
BODY MASS INDEX: 22.58 KG/M2 | SYSTOLIC BLOOD PRESSURE: 123 MMHG | TEMPERATURE: 98.2 F | DIASTOLIC BLOOD PRESSURE: 82 MMHG | OXYGEN SATURATION: 98 % | WEIGHT: 127.43 LBS | HEIGHT: 63 IN | HEART RATE: 63 BPM | RESPIRATION RATE: 16 BRPM

## 2021-08-30 DIAGNOSIS — R10.31 RIGHT LOWER QUADRANT ABDOMINAL PAIN: ICD-10-CM

## 2021-08-30 PROCEDURE — 88305 TISSUE EXAM BY PATHOLOGIST: CPT | Performed by: PATHOLOGY

## 2021-08-30 PROCEDURE — 45380 COLONOSCOPY AND BIOPSY: CPT | Performed by: INTERNAL MEDICINE

## 2021-08-30 RX ORDER — PROPOFOL 10 MG/ML
INJECTION, EMULSION INTRAVENOUS AS NEEDED
Status: DISCONTINUED | OUTPATIENT
Start: 2021-08-30 | End: 2021-08-30

## 2021-08-30 RX ORDER — LIDOCAINE HYDROCHLORIDE 20 MG/ML
INJECTION, SOLUTION EPIDURAL; INFILTRATION; INTRACAUDAL; PERINEURAL AS NEEDED
Status: DISCONTINUED | OUTPATIENT
Start: 2021-08-30 | End: 2021-08-30

## 2021-08-30 RX ORDER — SODIUM CHLORIDE, SODIUM LACTATE, POTASSIUM CHLORIDE, CALCIUM CHLORIDE 600; 310; 30; 20 MG/100ML; MG/100ML; MG/100ML; MG/100ML
125 INJECTION, SOLUTION INTRAVENOUS CONTINUOUS
Status: DISCONTINUED | OUTPATIENT
Start: 2021-08-30 | End: 2021-09-03 | Stop reason: HOSPADM

## 2021-08-30 RX ADMIN — PROPOFOL 30 MG: 10 INJECTION, EMULSION INTRAVENOUS at 08:14

## 2021-08-30 RX ADMIN — LIDOCAINE HYDROCHLORIDE 100 MG: 20 INJECTION, SOLUTION EPIDURAL; INFILTRATION; INTRACAUDAL; PERINEURAL at 08:09

## 2021-08-30 RX ADMIN — PROPOFOL 130 MG: 10 INJECTION, EMULSION INTRAVENOUS at 08:09

## 2021-08-30 RX ADMIN — PROPOFOL 30 MG: 10 INJECTION, EMULSION INTRAVENOUS at 08:11

## 2021-08-30 RX ADMIN — PROPOFOL 30 MG: 10 INJECTION, EMULSION INTRAVENOUS at 08:17

## 2021-08-30 RX ADMIN — SODIUM CHLORIDE, SODIUM LACTATE, POTASSIUM CHLORIDE, AND CALCIUM CHLORIDE 125 ML/HR: .6; .31; .03; .02 INJECTION, SOLUTION INTRAVENOUS at 07:24

## 2021-08-30 NOTE — ANESTHESIA POSTPROCEDURE EVALUATION
Post-Op Assessment Note    CV Status:  Stable  Pain Score: 0       Mental Status:  Sleepy   Hydration Status:  Stable   PONV Controlled:  Controlled   Airway Patency:  Patent and adequate   Two or more mitigation strategies used for obstructive sleep apnea   Post Op Vitals Reviewed: Yes      Staff: CRNA         No complications documented      BP     Temp      Pulse     Resp      SpO2

## 2021-08-30 NOTE — H&P
History and Physical - [facility]  Pat Angelucci 56 y o  female MRN: [id number]                  HPI: Pat Angelucci is a 56y o  year old female who presents for colonoscopy for persistent right lower quadrant pain  Last colonoscopy 9 years ago      REVIEW OF SYSTEMS: Per the HPI, and otherwise unremarkable  Historical Information   Past Medical History:   Diagnosis Date    Anemia     Aortic valve disease     Breast cancer ([address]     Cardiac murmur     Hyperlipidemia      Past Surgical History:   Procedure Laterality Date    BREAST SURGERY      lumpectomy    [place] ESOPHAGOGASTRODUODENOSCOPY TRANSORAL DIAGNOSTIC N/A 2/19/2019    Procedure: ESOPHAGOGASTRODUODENOSCOPY (EGD); Surgeon: Graciela Coffey MD;  Location: [facility]; Service: Gastroenterology     Social History   Social History     Substance and Sexual Activity   Alcohol Use Not Currently    Comment: social     Social History     Substance and Sexual Activity   Drug Use No     Social History     Tobacco Use   Smoking Status Never Smoker   Smokeless Tobacco Never Used     Family History   Problem Relation Age of Onset    Hypertension Father     No Known Problems Mother        Meds/Allergies     (Not in a hospital admission)      No Known Allergies    Objective     There were no vitals taken for this visit        PHYSICAL EXAM    Gen: NAD  CV: RRR  CHEST: Clear  ABD: soft, NT/ND  EXT: no edema  Neuro: AAO      ASSESSMENT/PLAN:  This is a 56y o  year old female here for right lower quadrant pain    PLAN:   Procedure:  Colonoscopy

## 2021-08-30 NOTE — ANESTHESIA PREPROCEDURE EVALUATION
Procedure:  COLONOSCOPY    Relevant Problems   No relevant active problems        Physical Exam    Airway    Mallampati score: III  TM Distance: >3 FB  Neck ROM: full     Dental   No notable dental hx     Cardiovascular  Rhythm: regular, Rate: normal, No murmur,     Pulmonary  Pulmonary exam normal Breath sounds clear to auscultation,     Other Findings  History of heart murmer  No ECHO on chart  No murmur heard on auscultation today      Anesthesia Plan  ASA Score- 3     Anesthesia Type- IV sedation with anesthesia with ASA Monitors  Additional Monitors:   Airway Plan:           Plan Factors-Exercise tolerance (METS): >4 METS  Chart reviewed  Patient is not a current smoker  Patient instructed to abstain from smoking on day of procedure  Patient did not smoke on day of surgery  There is medical exclusion for perioperative obstructive sleep apnea risk education  Induction- intravenous  Postoperative Plan-     Informed Consent- Anesthetic plan and risks discussed with patient  I personally reviewed this patient with the CRNA  Discussed and agreed on the Anesthesia Plan with the CRNA  Caitlin Araya

## 2021-08-30 NOTE — INTERVAL H&P NOTE
H&P reviewed  After examining the patient I find no changes in the patients condition since the H&P had been written      Vitals:    08/30/21 0717   BP: (!) 175/83   Pulse: 67   Resp: 16   Temp: 97 6 °F (36 4 °C)   SpO2: 99%

## 2021-10-01 ENCOUNTER — TELEPHONE (OUTPATIENT)
Dept: ADMINISTRATIVE | Facility: OTHER | Age: 61
End: 2021-10-01

## 2021-10-11 ENCOUNTER — RA CDI HCC (OUTPATIENT)
Dept: OTHER | Facility: HOSPITAL | Age: 61
End: 2021-10-11

## 2022-01-18 DIAGNOSIS — Z12.31 SCREENING MAMMOGRAM FOR BREAST CANCER: Primary | ICD-10-CM

## 2022-06-09 ENCOUNTER — RA CDI HCC (OUTPATIENT)
Dept: OTHER | Facility: HOSPITAL | Age: 62
End: 2022-06-09

## 2022-06-09 NOTE — PROGRESS NOTES
[facility]  coding opportunities       Chart reviewed, no opportunity found: CHART REVIEWED, NO OPPORTUNITY FOUND        Patients Insurance        Commercial Insurance: Brittany Enriquez

## 2022-06-16 ENCOUNTER — OFFICE VISIT (OUTPATIENT)
Dept: INTERNAL MEDICINE CLINIC | Facility: CLINIC | Age: 62
End: 2022-06-16
Payer: COMMERCIAL

## 2022-06-16 VITALS
RESPIRATION RATE: 14 BRPM | SYSTOLIC BLOOD PRESSURE: 132 MMHG | BODY MASS INDEX: 23.04 KG/M2 | DIASTOLIC BLOOD PRESSURE: 88 MMHG | HEART RATE: 66 BPM | HEIGHT: 63 IN | OXYGEN SATURATION: 97 % | WEIGHT: 130 LBS

## 2022-06-16 DIAGNOSIS — Z11.4 SCREENING FOR HIV WITHOUT PRESENCE OF RISK FACTORS: ICD-10-CM

## 2022-06-16 DIAGNOSIS — E78.5 HYPERLIPIDEMIA, UNSPECIFIED HYPERLIPIDEMIA TYPE: Primary | ICD-10-CM

## 2022-06-16 DIAGNOSIS — Z00.00 ANNUAL PHYSICAL EXAM: ICD-10-CM

## 2022-06-16 DIAGNOSIS — R73.03 PREDIABETES: ICD-10-CM

## 2022-06-16 DIAGNOSIS — R53.83 OTHER FATIGUE: ICD-10-CM

## 2022-06-16 DIAGNOSIS — Z11.59 ENCOUNTER FOR HEPATITIS C SCREENING TEST FOR LOW RISK PATIENT: ICD-10-CM

## 2022-06-16 PROCEDURE — 99214 OFFICE O/P EST MOD 30 MIN: CPT | Performed by: INTERNAL MEDICINE

## 2022-06-16 PROCEDURE — 3008F BODY MASS INDEX DOCD: CPT | Performed by: INTERNAL MEDICINE

## 2022-06-16 PROCEDURE — 3725F SCREEN DEPRESSION PERFORMED: CPT | Performed by: INTERNAL MEDICINE

## 2022-06-16 PROCEDURE — 99396 PREV VISIT EST AGE 40-64: CPT | Performed by: INTERNAL MEDICINE

## 2022-06-16 PROCEDURE — 1036F TOBACCO NON-USER: CPT | Performed by: INTERNAL MEDICINE

## 2022-06-16 NOTE — PROGRESS NOTES
Assessment/Plan:     Alem Joseph is here for annual physical  She is doing well  She has a PMH for HLD and stage 1 breast cancer  She was previously taking tamoxifen but has completed it  She denies any issues today except for some fatigue  She would like to get labs done  She has a mammogram scheduled for September  Will be seeing GYN soon  She denies any c/o cp, sob  Obtain labs  Quality Measures:     Depression Screening and Follow-up Plan: Patient was screened for depression during today's encounter  They screened negative with a PHQ-2 score of 0  Return in about 6 months (around 12/16/2022)  No problem-specific Assessment & Plan notes found for this encounter  Diagnoses and all orders for this visit:    Hyperlipidemia, unspecified hyperlipidemia type  -     CBC and differential; Future  -     Comprehensive metabolic panel; Future  -     Hemoglobin A1C; Future  -     Lipid Panel with Direct LDL reflex; Future    Prediabetes  -     Hemoglobin A1C; Future  -     TSH, 3rd generation with Free T4 reflex; Future    Screening for HIV without presence of risk factors  -     HIV 1/2 Antigen/Antibody (4th Generation) w Reflex [facility]; Future    Encounter for hepatitis C screening test for low risk patient  -     Hepatitis C antibody; Future    Other fatigue  -     Vitamin D 25 hydroxy; Future    Annual physical exam          Subjective:      Patient ID: Odalys Pan is a 59 y o  female      Alem Joseph is here for physical       ALLERGIES:  No Known Allergies    CURRENT MEDICATIONS:    Current Outpatient Medications:     Cholecalciferol (VITAMIN D3) 2000 units capsule, Take 1 capsule by mouth daily, Disp: , Rfl:     Omega-3 Fatty Acids (FISH OIL) 1,000 mg, Take 1,000 mg by mouth daily, Disp: , Rfl:     ACTIVE PROBLEM LIST:  Patient Active Problem List   Diagnosis    Aortic valve disease    Palpitations    RUQ pain       PAST MEDICAL HISTORY:  Past Medical History:   Diagnosis Date    Anemia     Aortic valve disease     [address] [facility] [address] [address]     Cardiac murmur     Hyperlipidemia        PAST SURGICAL HISTORY:  Past Surgical History:   Procedure Laterality Date    BREAST SURGERY      lumpectomy    COLONOSCOPY      [place] ESOPHAGOGASTRODUODENOSCOPY TRANSORAL DIAGNOSTIC N/A 2/19/2019    Procedure: ESOPHAGOGASTRODUODENOSCOPY (EGD); Surgeon: Beto Morgan MD;  Location: [facility]; Service: Gastroenterology       FAMILY HISTORY:  Family History   Problem Relation Age of Onset    No Known Problems Mother     Hypertension Father        SOCIAL HISTORY:  Social History     Socioeconomic History    Marital status: /Civil Union     Spouse name: Not on file    Number of children: Not on file    Years of education: Not on file    Highest education level: Not on file   Occupational History    Not on file   Tobacco Use    Smoking status: Never Smoker    Smokeless tobacco: Never Used   Vaping Use    Vaping Use: Never used   Substance and Sexual Activity    Alcohol use: Not Currently     Comment: social    Drug use: No    Sexual activity: Yes   Other Topics Concern    Not on file   Social History Narrative    Not on file     Social Determinants of Health     Financial Resource Strain: Not on file   Food Insecurity: Not on file   Transportation Needs: Not on file   Physical Activity: Not on file   Stress: Not on file   Social Connections: Not on file   Intimate Partner Violence: Not on file   Housing Stability: Not on file       Review of Systems   Constitutional: Positive for fatigue  All other systems reviewed and are negative  Objective:  Vitals:    06/16/22 0940   BP: 132/88   BP Location: Left arm   Patient Position: Sitting   Pulse: 66   Resp: 14   SpO2: 97%   Weight: 59 kg (130 lb)   Height: 5' 3" (1 6 m)     Body mass index is 23 03 kg/m²  Physical Exam  Vitals and nursing note reviewed  Constitutional:       Appearance: Normal appearance  She is normal weight     HENT:      Head: Normocephalic and atraumatic  Nose: Nose normal    Cardiovascular:      Rate and Rhythm: Normal rate  Heart sounds: Normal heart sounds  Pulmonary:      Effort: Pulmonary effort is normal       Breath sounds: Normal breath sounds  Abdominal:      Palpations: Abdomen is soft  Musculoskeletal:         General: Normal range of motion  Cervical back: Normal range of motion  Right lower leg: No edema  Left lower leg: No edema  Skin:     General: Skin is warm and dry  Neurological:      General: No focal deficit present  Mental Status: She is alert and oriented to person, place, and time  Mental status is at baseline  Psychiatric:         Mood and Affect: Mood normal            RESULTS:    No results found for this or any previous visit (from the past 1008 hour(s))  This note was created with voice recognition software  Phonic, grammatical and spelling errors may be present within the note as a result

## 2022-06-16 NOTE — PROGRESS NOTES
[facility] [place]    NAME: Андрей Rodgers  AGE: 59 y o  SEX: female  : 1960     DATE: 2022     Assessment and Plan:     Problem List Items Addressed This Visit    None     Visit Diagnoses     Hyperlipidemia, unspecified hyperlipidemia type    -  Primary    Relevant Orders    CBC and differential    Comprehensive metabolic panel    Hemoglobin A1C    Lipid Panel with Direct LDL reflex    Prediabetes        Relevant Orders    Hemoglobin A1C    TSH, 3rd generation with Free T4 reflex    Screening for HIV without presence of risk factors        Relevant Orders    HIV 1/2 Antigen/Antibody (4th Generation) w Reflex [facility]    Encounter for hepatitis C screening test for low risk patient        Relevant Orders    Hepatitis C antibody    Other fatigue        Relevant Orders    Vitamin D 25 hydroxy    Annual physical exam              Immunizations and preventive care screenings were discussed with patient today  Appropriate education was printed on patient's after visit summary  Counseling:  Alcohol/drug use: discussed moderation in alcohol intake, the recommendations for healthy alcohol use, and avoidance of illicit drug use  Dental Health: discussed importance of regular tooth brushing, flossing, and dental visits  Injury prevention: discussed safety/seat belts, safety helmets, smoke detectors, carbon dioxide detectors, and smoking near bedding or upholstery  Sexual health: discussed sexually transmitted diseases, partner selection, use of condoms, avoidance of unintended pregnancy, and contraceptive alternatives  · Exercise: the importance of regular exercise/physical activity was discussed  Recommend exercise 3-5 times per week for at least 30 minutes  Return in about 6 months (around 2022)       Chief Complaint:     Chief Complaint   Patient presents with    Annual Exam      History of Present Illness: Adult Annual Physical   Patient here for a comprehensive physical exam  The patient reports problems - fatigue  Diet and Physical Activity  · Diet/Nutrition: well balanced diet  · Exercise: walking  Depression Screening  PHQ-2/9 Depression Screening    Little interest or pleasure in doing things: 0 - not at all  Feeling down, depressed, or hopeless: 0 - not at all  PHQ-2 Score: 0  PHQ-2 Interpretation: Negative depression screen       General Health  · Sleep: sleeps well  · Hearing: normal - bilateral   · Vision: no vision problems  · Dental: regular dental visits  /GYN Health  · Patient is: postmenopausal  · Last menstrual period: n/a  · Contraceptive method: n/a  Review of Systems:     Review of Systems   Past Medical History:     Past Medical History:   Diagnosis Date    Anemia     Aortic valve disease     Breast cancer ([address] )     Cardiac murmur     Hyperlipidemia       Past Surgical History:     Past Surgical History:   Procedure Laterality Date    BREAST SURGERY      lumpectomy    COLONOSCOPY      [place] ESOPHAGOGASTRODUODENOSCOPY TRANSORAL DIAGNOSTIC N/A 2/19/2019    Procedure: ESOPHAGOGASTRODUODENOSCOPY (EGD); Surgeon: Jordy Tafoya MD;  Location: [facility];   Service: Gastroenterology      Social History:     Social History     Socioeconomic History    Marital status: /Civil Union     Spouse name: None    Number of children: None    Years of education: None    Highest education level: None   Occupational History    None   Tobacco Use    Smoking status: Never Smoker    Smokeless tobacco: Never Used   Vaping Use    Vaping Use: Never used   Substance and Sexual Activity    Alcohol use: Not Currently     Comment: social    Drug use: No    Sexual activity: Yes   Other Topics Concern    None   Social History Narrative    None     Social Determinants of Health     Financial Resource Strain: Not on file   Food Insecurity: Not on file   Transportation Needs: Not on file   Physical Activity: Not on file   Stress: Not on file   Social Connections: Not on file   Intimate Partner Violence: Not on file   Housing Stability: Not on file      Family History:     Family History   Problem Relation Age of Onset    No Known Problems Mother     Hypertension Father       Current Medications:     Current Outpatient Medications   Medication Sig Dispense Refill    Cholecalciferol (VITAMIN D3) 2000 units capsule Take 1 capsule by mouth daily      Omega-3 Fatty Acids (FISH OIL) 1,000 mg Take 1,000 mg by mouth daily       No current facility-administered medications for this visit        Allergies:     No Known Allergies   Physical Exam:     /88 (BP Location: Left arm, Patient Position: Sitting)   Pulse 66   Resp 14   Ht 5' 3" (1 6 m)   Wt 59 kg (130 lb)   SpO2 97%   BMI 23 03 kg/m²     Physical Exam     Karel Weiss MD  [facility] [place]

## 2022-06-16 NOTE — PATIENT INSTRUCTIONS

## 2022-06-16 NOTE — PROGRESS NOTES
Assessment/Plan:      Quality Measures:       Return in about 6 months (around 12/16/2022)  No problem-specific Assessment & Plan notes found for this encounter  Diagnoses and all orders for this visit:    Hyperlipidemia, unspecified hyperlipidemia type  -     CBC and differential; Future  -     Comprehensive metabolic panel; Future  -     Hemoglobin A1C; Future  -     Lipid Panel with Direct LDL reflex; Future    Prediabetes  -     Hemoglobin A1C; Future  -     TSH, 3rd generation with Free T4 reflex; Future    Screening for HIV without presence of risk factors  -     HIV 1/2 Antigen/Antibody (4th Generation) w Reflex [facility]; Future    Encounter for hepatitis C screening test for low risk patient  -     Hepatitis C antibody; Future    Other fatigue  -     Vitamin D 25 hydroxy; Future          Subjective:      Patient ID: Gucci Murphy is a 59 y o  female  HPI    ALLERGIES:  No Known Allergies    CURRENT MEDICATIONS:    Current Outpatient Medications:     Cholecalciferol (VITAMIN D3) 2000 units capsule, Take 1 capsule by mouth daily, Disp: , Rfl:     Omega-3 Fatty Acids (FISH OIL) 1,000 mg, Take 1,000 mg by mouth daily, Disp: , Rfl:     ACTIVE PROBLEM LIST:  Patient Active Problem List   Diagnosis    Aortic valve disease    Palpitations    RUQ pain       PAST MEDICAL HISTORY:  Past Medical History:   Diagnosis Date    Anemia     Aortic valve disease     Breast cancer ([facility] [address] )     Cardiac murmur     Hyperlipidemia        PAST SURGICAL HISTORY:  Past Surgical History:   Procedure Laterality Date    BREAST SURGERY      lumpectomy    COLONOSCOPY      [place] ESOPHAGOGASTRODUODENOSCOPY TRANSORAL DIAGNOSTIC N/A 2/19/2019    Procedure: ESOPHAGOGASTRODUODENOSCOPY (EGD); Surgeon: Rodríguez Cade MD;  Location: [facility];   Service: Gastroenterology       FAMILY HISTORY:  Family History   Problem Relation Age of Onset    No Known Problems Mother     Hypertension Father        SOCIAL HISTORY:  Social History Socioeconomic History    Marital status: /Civil Union     Spouse name: Not on file    Number of children: Not on file    Years of education: Not on file    Highest education level: Not on file   Occupational History    Not on file   Tobacco Use    Smoking status: Never Smoker    Smokeless tobacco: Never Used   Vaping Use    Vaping Use: Never used   Substance and Sexual Activity    Alcohol use: Not Currently     Comment: social    Drug use: No    Sexual activity: Yes   Other Topics Concern    Not on file   Social History Narrative    Not on file     Social Determinants of Health     Financial Resource Strain: Not on file   Food Insecurity: Not on file   Transportation Needs: Not on file   Physical Activity: Not on file   Stress: Not on file   Social Connections: Not on file   Intimate Partner Violence: Not on file   Housing Stability: Not on file       Review of Systems      Objective:  Vitals:    06/16/22 0940   BP: 132/88   BP Location: Left arm   Patient Position: Sitting   Pulse: 66   Resp: 14   SpO2: 97%   Weight: 59 kg (130 lb)   Height: 5' 3" (1 6 m)     Body mass index is 23 03 kg/m²  Physical Exam      RESULTS:    No results found for this or any previous visit (from the past 1008 hour(s))  This note was created with voice recognition software  Phonic, grammatical and spelling errors may be present within the note as a result

## 2022-06-17 ENCOUNTER — APPOINTMENT (OUTPATIENT)
Dept: LAB | Facility: HOSPITAL | Age: 62
End: 2022-06-17
Payer: COMMERCIAL

## 2022-06-17 DIAGNOSIS — Z11.59 ENCOUNTER FOR HEPATITIS C SCREENING TEST FOR LOW RISK PATIENT: ICD-10-CM

## 2022-06-17 DIAGNOSIS — R73.03 PREDIABETES: ICD-10-CM

## 2022-06-17 DIAGNOSIS — R53.83 OTHER FATIGUE: ICD-10-CM

## 2022-06-17 DIAGNOSIS — E78.5 HYPERLIPIDEMIA, UNSPECIFIED HYPERLIPIDEMIA TYPE: ICD-10-CM

## 2022-06-17 DIAGNOSIS — Z11.4 SCREENING FOR HIV WITHOUT PRESENCE OF RISK FACTORS: ICD-10-CM

## 2022-06-17 LAB
25(OH)D3 SERPL-MCNC: 43.4 NG/ML (ref 30–100)
ALBUMIN SERPL BCP-MCNC: 4 G/DL (ref 3.5–5)
ALP SERPL-CCNC: 100 U/L (ref 46–116)
ALT SERPL W P-5'-P-CCNC: 31 U/L (ref 12–78)
ANION GAP SERPL CALCULATED.3IONS-SCNC: 11 MMOL/L (ref 4–13)
AST SERPL W P-5'-P-CCNC: 19 U/L (ref 5–45)
BASOPHILS # BLD AUTO: 0.02 THOUSANDS/ΜL (ref 0–0.1)
BASOPHILS NFR BLD AUTO: 1 % (ref 0–1)
BILIRUB SERPL-MCNC: 1.04 MG/DL (ref 0.2–1)
BUN SERPL-MCNC: 11 MG/DL (ref 5–25)
CALCIUM SERPL-MCNC: 9.3 MG/DL (ref 8.3–10.1)
CHLORIDE SERPL-SCNC: 107 MMOL/L (ref 100–108)
CHOLEST SERPL-MCNC: 260 MG/DL
CO2 SERPL-SCNC: 27 MMOL/L (ref 21–32)
CREAT SERPL-MCNC: 0.72 MG/DL (ref 0.6–1.3)
EOSINOPHIL # BLD AUTO: 0.08 THOUSAND/ΜL (ref 0–0.61)
EOSINOPHIL NFR BLD AUTO: 2 % (ref 0–6)
ERYTHROCYTE [DISTWIDTH] IN BLOOD BY AUTOMATED COUNT: 13 % (ref 11.6–15.1)
EST. AVERAGE GLUCOSE BLD GHB EST-MCNC: 111 MG/DL
GFR SERPL CREATININE-BSD FRML MDRD: 89 ML/MIN/1.73SQ M
GLUCOSE P FAST SERPL-MCNC: 99 MG/DL (ref 65–99)
HBA1C MFR BLD: 5.5 %
HCT VFR BLD AUTO: 44.5 % (ref 34.8–46.1)
HDLC SERPL-MCNC: 84 MG/DL
HGB BLD-MCNC: 14.5 G/DL (ref 11.5–15.4)
IMM GRANULOCYTES # BLD AUTO: 0.01 THOUSAND/UL (ref 0–0.2)
IMM GRANULOCYTES NFR BLD AUTO: 0 % (ref 0–2)
LDLC SERPL CALC-MCNC: 156 MG/DL (ref 0–100)
LYMPHOCYTES # BLD AUTO: 1.35 THOUSANDS/ΜL (ref 0.6–4.47)
LYMPHOCYTES NFR BLD AUTO: 34 % (ref 14–44)
MCH RBC QN AUTO: 30.4 PG (ref 26.8–34.3)
MCHC RBC AUTO-ENTMCNC: 32.6 G/DL (ref 31.4–37.4)
MCV RBC AUTO: 93 FL (ref 82–98)
MONOCYTES # BLD AUTO: 0.19 THOUSAND/ΜL (ref 0.17–1.22)
MONOCYTES NFR BLD AUTO: 5 % (ref 4–12)
NEUTROPHILS # BLD AUTO: 2.37 THOUSANDS/ΜL (ref 1.85–7.62)
NEUTS SEG NFR BLD AUTO: 58 % (ref 43–75)
NRBC BLD AUTO-RTO: 0 /100 WBCS
PLATELET # BLD AUTO: 232 THOUSANDS/UL (ref 149–390)
PMV BLD AUTO: 9.7 FL (ref 8.9–12.7)
POTASSIUM SERPL-SCNC: 3.8 MMOL/L (ref 3.5–5.3)
PROT SERPL-MCNC: 7.6 G/DL (ref 6.4–8.2)
RBC # BLD AUTO: 4.77 MILLION/UL (ref 3.81–5.12)
SODIUM SERPL-SCNC: 145 MMOL/L (ref 136–145)
TRIGL SERPL-MCNC: 100 MG/DL
TSH SERPL DL<=0.05 MIU/L-ACNC: 1.22 UIU/ML (ref 0.45–4.5)
WBC # BLD AUTO: 4.02 THOUSAND/UL (ref 4.31–10.16)

## 2022-06-17 PROCEDURE — 83036 HEMOGLOBIN GLYCOSYLATED A1C: CPT

## 2022-06-17 PROCEDURE — 87389 HIV-1 AG W/HIV-1&-2 AB AG IA: CPT

## 2022-06-17 PROCEDURE — 36415 COLL VENOUS BLD VENIPUNCTURE: CPT

## 2022-06-17 PROCEDURE — 80053 COMPREHEN METABOLIC PANEL: CPT

## 2022-06-17 PROCEDURE — 85025 COMPLETE CBC W/AUTO DIFF WBC: CPT

## 2022-06-17 PROCEDURE — 86803 HEPATITIS C AB TEST: CPT

## 2022-06-17 PROCEDURE — 80061 LIPID PANEL: CPT

## 2022-06-17 PROCEDURE — 84443 ASSAY THYROID STIM HORMONE: CPT

## 2022-06-17 PROCEDURE — 82306 VITAMIN D 25 HYDROXY: CPT

## 2022-06-18 LAB — HCV AB SER QL: NORMAL

## 2022-06-19 LAB — HIV 1+2 AB+HIV1 P24 AG SERPL QL IA: NORMAL

## 2022-07-19 ENCOUNTER — DOCUMENTATION (OUTPATIENT)
Dept: INTERNAL MEDICINE CLINIC | Facility: CLINIC | Age: 62
End: 2022-07-19

## 2022-07-19 NOTE — PROGRESS NOTES
Spoke with Claude Loh she has her mammogram done every year at [facility] in [place]   I did fax a request to have her last mammogram sent to        [phone] fax  [phone] phone

## 2022-08-10 ENCOUNTER — TELEPHONE (OUTPATIENT)
Dept: ADMINISTRATIVE | Facility: OTHER | Age: 62
End: 2022-08-10

## 2022-08-10 NOTE — TELEPHONE ENCOUNTER
----- Message from Chante Domingo sent at 8/9/2022 11:57 AM EDT -----  Regarding: mammo  08/09/22 11:57 AM    Hello, our patient attached above has had Mammogram completed/performed  Please assist in updating the patient chart by pulling the document from the Media Tab  The date of service is 9/23/21 scanned in 8/2/22       Thank you,  Patrick Mcpherson MA  [facility]

## 2022-08-29 NOTE — TELEPHONE ENCOUNTER
Upon review of the In Basket request we were able to locate, review, and update the patient chart as requested for Mammogram     Any additional questions or concerns should be emailed to the Practice Liaisons via [email or web address] email, please do not reply via In Basket      Thank you  Duyen Crooks MA

## 2022-11-29 ENCOUNTER — OFFICE VISIT (OUTPATIENT)
Dept: GASTROENTEROLOGY | Facility: CLINIC | Age: 62
End: 2022-11-29

## 2022-11-29 VITALS
SYSTOLIC BLOOD PRESSURE: 128 MMHG | BODY MASS INDEX: 22.32 KG/M2 | HEART RATE: 70 BPM | DIASTOLIC BLOOD PRESSURE: 82 MMHG | HEIGHT: 63 IN | OXYGEN SATURATION: 99 % | WEIGHT: 126 LBS

## 2022-11-29 DIAGNOSIS — R10.31 RIGHT LOWER QUADRANT ABDOMINAL PAIN: Primary | ICD-10-CM

## 2022-11-29 DIAGNOSIS — K21.9 GASTROESOPHAGEAL REFLUX DISEASE WITHOUT ESOPHAGITIS: ICD-10-CM

## 2022-11-29 NOTE — PROGRESS NOTES
[facility]      Chief Complaint:  Right lower quadrant pain    HPI:  Stormy Mccain is a 57 y o   female who presents with rare right lower quadrant pain that happens if she does not have a bowel movement  Having a bowel movement gets her to the pain  Colonoscopy in 2021 did not reveal any significant pathology  She has no bleeding  No weight loss  No radiation of the discomfort  It is fairly minor and very infrequent  She has no other complaint  She had a history of GERD but is not having any symptomatology at this point at all  She has no other complaints         Review of Systems:   Constitutional: No fever or chills, feels well, no tiredness, no recent weight gain or weight loss  HENT: No complaints of earache, no hearing loss, no nosebleeds, no nasal discharge, no sore throat, no hoarseness  Eyes: No complaints of eye pain, no red eyes, no discharge from eyes, no itchy eyes  Cardiovascular: No complaints of slow heart rate, no fast heart rate, no chest pain, no palpitations, no leg claudication, no lower extremity edema  Respiratory: No complaints of shortness of breath, no wheezing, no cough, no SOB on exertion, no orthopnea  Gastrointestinal: As noted in HPI  Genitourinary: No complaints of dysuria, no incontinence, no hesitancy, no nocturia  Musculoskeletal: No complaints of arthralgia, no myalgias, no joint swelling or stiffness, no limb pain or swelling  Neurological: No complaints of headache, no confusion, no convulsions, no numbness or tingling, no dizziness or fainting, no limb weakness, no difficulty walking  Skin: No complaints of skin rash or skin lesions, no itching, no skin wound, no dry skin  Hematological/Lymphatic: No complaints of swollen glands, does not bleed easy  Allergic/Immunologic: No immunocompromised state  Endocrine:  No complaints of polyuria, no polydipsia     Psychiatric/Behavioral: is not suicidal, no sleep disturbances, no anxiety or depression, no change in personality, no emotional problems  Historical Information   Past Medical History:   Diagnosis Date   • Anemia    • Aortic valve disease    • Breast cancer ([address] )    • Cardiac murmur    • Hyperlipidemia      Past Surgical History:   Procedure Laterality Date   • BREAST SURGERY      lumpectomy   • COLONOSCOPY     • [place] ESOPHAGOGASTRODUODENOSCOPY TRANSORAL DIAGNOSTIC N/A 2/19/2019    Procedure: ESOPHAGOGASTRODUODENOSCOPY (EGD); Surgeon: Adrienne Muniz MD;  Location: [facility]; Service: Gastroenterology     Social History   Social History     Substance and Sexual Activity   Alcohol Use Not Currently    Comment: social     Social History     Substance and Sexual Activity   Drug Use No     Social History     Tobacco Use   Smoking Status Never   Smokeless Tobacco Never     Family History   Problem Relation Age of Onset   • No Known Problems Mother    • Hypertension Father          Current Medications: has a current medication list which includes the following prescription(s): vitamin d3 and fish oil  Vital Signs: /82   Pulse 70   Ht 5' 3" (1 6 m)   Wt 57 2 kg (126 lb)   SpO2 99%   BMI 22 32 kg/m²       Physical Exam:   Constitutional  General Appearance: No acute distress, well appearing and well nourished  Head  Normocephalic  Eyes  Conjunctivae and lids: No swelling, erythema, or discharge  Pupils and irises: Equal, round and reactive to light  Ears, Nose, Mouth, and Throat  External inspection of ears and nose: Normal  Nasal mucosa, septum and turbinates: Normal without edema or erythema/   Oropharynx: Normal with no erythema, edema, exudate or lesions  Neck  Normal range of motion  Neck supple  Cardiovascular  Auscultation of the heart: Normal rate and rhythm, normal S1 and S2 without murmurs    Examination of the extremities for edema and/or varicosities: Normal  Pulmonary/Chest  Respiratory effort: No increased work of breathing or signs of respiratory distress  Auscultation of lungs: Clear to auscultation, equal breath sounds bilaterally, no wheezes, rales, no rhonchi  Abdomen  Abdomen: Non-tender, no masses  Liver and spleen: No hepatomegaly or splenomegaly  Musculoskeletal  Gait and station: normal   Digits and Nails: normal without clubbing or cyanosis  Inspection/palpation of joints, bones, and muscles: Normal  Neurological  No nystagmus or asterixis  Skin  Skin and subcutaneous tissue: Normal without rashes or lesions  Lymphatic  Palpation of the lymph nodes in neck: No lymphadenopathy  Psychiatric  Orientation to person, place and time: Normal   Mood and affect: Normal          Labs:  Lab Results   Component Value Date    ALT 31 06/17/2022    AST 19 06/17/2022    BUN 11 06/17/2022    CALCIUM 9 3 06/17/2022     06/17/2022    CHOL 216 (H) 03/22/2016    CO2 27 06/17/2022    CREATININE 0 72 06/17/2022    HDL 84 06/17/2022    HCT 44 5 06/17/2022    HGB 14 5 06/17/2022    HGBA1C 5 5 06/17/2022     06/17/2022    K 3 8 06/17/2022     03/22/2016    TRIG 100 06/17/2022    WBC 4 02 (L) 06/17/2022         X-Rays & Procedures:   No orders to display           ______________________________________________________________________      Assessment & Plan:     Diagnoses and all orders for this visit:    Right lower quadrant abdominal pain    Gastroesophageal reflux disease without esophagitis      Patient is not having any GERD symptomatology  She moves her bowels as long she has her coffee  On the rare instance that she does not she gets the right lower quadrant pain  She is advised Metamucil  We went over the schedule for repeat colonoscopy  She is again advised to get a fit test done every year or 2

## 2022-11-30 ENCOUNTER — OFFICE VISIT (OUTPATIENT)
Dept: CARDIOLOGY CLINIC | Facility: CLINIC | Age: 62
End: 2022-11-30

## 2022-11-30 VITALS
DIASTOLIC BLOOD PRESSURE: 88 MMHG | OXYGEN SATURATION: 98 % | HEIGHT: 63 IN | SYSTOLIC BLOOD PRESSURE: 128 MMHG | WEIGHT: 128 LBS | HEART RATE: 80 BPM | BODY MASS INDEX: 22.68 KG/M2 | RESPIRATION RATE: 16 BRPM

## 2022-11-30 DIAGNOSIS — E78.5 HYPERLIPIDEMIA, UNSPECIFIED HYPERLIPIDEMIA TYPE: ICD-10-CM

## 2022-11-30 DIAGNOSIS — R01.1 MURMUR: Primary | ICD-10-CM

## 2022-11-30 DIAGNOSIS — R00.2 PALPITATIONS: ICD-10-CM

## 2022-11-30 NOTE — PROGRESS NOTES
Cardiology Follow Up    Maine Florence  1960  [id number]  [facility] [address] [place] [address] Jasmina Steel Fiorella MUIR [id number] [phone]    1  Murmur  -     Echo complete w/ contrast if indicated; Future; Expected date: 11/30/2022    2  Palpitations  -     POCT ECG  -     CT coronary calcium score; Future; Expected date: 11/30/2022    3  Hyperlipidemia, unspecified hyperlipidemia type          Interval History:  68-year-old lady who is very physically active and has no symptoms other than occasional brief palpitations  She did have an echo stress test in 2016  At that time she had anterior T-wave inversions and small cues inferiorly  Stress test was considered to be negative  She does not get exertional discomfort  Noteworthy is the fact that LDL cholesterol significantly elevated in the 150s  There is no family history of heart disease  She is a nonsmoker      Patient Active Problem List   Diagnosis   • Aortic valve disease   • Palpitations   • RUQ pain   • Hyperlipidemia     Past Medical History:   Diagnosis Date   • Anemia    • Aortic valve disease    • Breast cancer ([facility] [address] )    • Cardiac murmur    • Hyperlipidemia      Social History     Socioeconomic History   • Marital status: /Civil Union     Spouse name: Not on file   • Number of children: Not on file   • Years of education: Not on file   • Highest education level: Not on file   Occupational History   • Not on file   Tobacco Use   • Smoking status: Never   • Smokeless tobacco: Never   Vaping Use   • Vaping Use: Never used   Substance and Sexual Activity   • Alcohol use: Not Currently     Comment: social   • Drug use: No   • Sexual activity: Yes   Other Topics Concern   • Not on file   Social History Narrative   • Not on file     Social Determinants of Health     Financial Resource Strain: Not on file   Food Insecurity: Not on file   Transportation Needs: Not on file   Physical Activity: Not on file   Stress: Not on file   Social Connections: Not on file   Intimate Partner Violence: Not on file   Housing Stability: Not on file      Family History   Problem Relation Age of Onset   • No Known Problems Mother    • Hypertension Father      Past Surgical History:   Procedure Laterality Date   • BREAST SURGERY      lumpectomy   • COLONOSCOPY     • [place] ESOPHAGOGASTRODUODENOSCOPY TRANSORAL DIAGNOSTIC N/A 2/19/2019    Procedure: ESOPHAGOGASTRODUODENOSCOPY (EGD); Surgeon: Christos Cuevas MD;  Location: [facility]; Service: Gastroenterology       Current Outpatient Medications:   •  Cholecalciferol (VITAMIN D3) 2000 units capsule, Take 1 capsule by mouth daily, Disp: , Rfl:   •  Omega-3 Fatty Acids (FISH OIL) 1,000 mg, Take 1,000 mg by mouth daily, Disp: , Rfl:   No Known Allergies    Labs:  No visits with results within 2 Month(s) from this visit     Latest known visit with results is:   Appointment on 06/17/2022   Component Date Value   • WBC 06/17/2022 4 02 (L)    • RBC 06/17/2022 4 77    • Hemoglobin 06/17/2022 14 5    • Hematocrit 06/17/2022 44 5    • MCV 06/17/2022 93    • MCH 06/17/2022 30 4    • MCHC 06/17/2022 32 6    • RDW 06/17/2022 13 0    • MPV 06/17/2022 9 7    • Platelets 50/46/1640 232    • nRBC 06/17/2022 0    • Neutrophils Relative 06/17/2022 58    • Immat GRANS % 06/17/2022 0    • Lymphocytes Relative 06/17/2022 34    • Monocytes Relative 06/17/2022 5    • Eosinophils Relative 06/17/2022 2    • Basophils Relative 06/17/2022 1    • Neutrophils Absolute 06/17/2022 2 37    • Immature Grans Absolute 06/17/2022 0 01    • Lymphocytes Absolute 06/17/2022 1 35    • Monocytes Absolute 06/17/2022 0 19    • Eosinophils Absolute 06/17/2022 0 08    • Basophils Absolute 06/17/2022 0 02    • Sodium 06/17/2022 145    • Potassium 06/17/2022 3 8    • Chloride 06/17/2022 107    • CO2 06/17/2022 27    • ANION GAP 06/17/2022 11    • BUN 06/17/2022 11    • Creatinine 06/17/2022 0 72 • Glucose, Fasting 06/17/2022 99    • Calcium 06/17/2022 9 3    • AST 06/17/2022 19    • ALT 06/17/2022 31    • Alkaline Phosphatase 06/17/2022 100    • Total Protein 06/17/2022 7 6    • Albumin 06/17/2022 4 0    • Total Bilirubin 06/17/2022 1 04 (H)    • eGFR 06/17/2022 89    • Hemoglobin A1C 06/17/2022 5 5    • EAG 06/17/2022 111    • HIV-1/HIV-2 Ab 06/17/2022 Non-Reactive    • Cholesterol 06/17/2022 260 (H)    • Triglycerides 06/17/2022 100    • HDL, Direct 06/17/2022 84    • LDL Calculated 06/17/2022 156 (H)    • TSH 3RD GENERATON 06/17/2022 1 220    • Hepatitis C Ab 06/17/2022 Non-reactive    • Vit D, 25-Hydroxy 06/17/2022 43 4      Imaging: No results found  Review of Systems:  Review of Systems    Physical Exam:  128/85  Heart rate 80 and regular  Lungs clear  No carotid bruits  Rhythm regular  Grade 1 systolic ejection murmur at the apex  Regular rhythm  No organomegaly  Good pulses  No edema  EKG shows T-wave inversions anteriorly as has been seen previously and possible inferior wall scar  Discussion/Summary:    1  Palpitations-benign  2  Abnormal EKG with previous normal stress test  3  Heart murmur    Recommendations:    1  CT calcium score  2  If positive consider possible stress test  3  Echocardiogram  4  Consider a statin if calcium score is positive  5   Return after test        Mike Chi MD

## 2022-12-01 ENCOUNTER — OFFICE VISIT (OUTPATIENT)
Dept: INTERNAL MEDICINE CLINIC | Facility: CLINIC | Age: 62
End: 2022-12-01

## 2022-12-01 VITALS
SYSTOLIC BLOOD PRESSURE: 126 MMHG | TEMPERATURE: 98.7 F | WEIGHT: 126.4 LBS | HEIGHT: 63 IN | HEART RATE: 78 BPM | DIASTOLIC BLOOD PRESSURE: 82 MMHG | RESPIRATION RATE: 20 BRPM | BODY MASS INDEX: 22.39 KG/M2 | OXYGEN SATURATION: 95 %

## 2022-12-01 DIAGNOSIS — R05.9 COUGH, UNSPECIFIED TYPE: Primary | ICD-10-CM

## 2022-12-01 NOTE — PROGRESS NOTES
COVID-19 Outpatient Progress Note    Assessment/Plan:    Problem List Items Addressed This Visit    None  Visit Diagnoses     Cough, unspecified type    -  Primary    Relevant Orders    Covid/Flu- Office Collect         Disposition:     PCR testing will be performed to test for COVID-19  I have spent 10 minutes directly with the patient  Greater than 50% of this time was spent in counseling/coordination of care regarding: instructions for management  Encounter provider: Rafat Bobby MD     Provider located at: [address] [place] [address] [address]  [facility]  [address] [place] [address] [phone]     Recent Visits  No visits were found meeting these conditions  Showing recent visits within past 7 days and meeting all other requirements  Today's Visits  Date Type Provider Dept   12/01/22 Office Visit Rafat Bobby MD  Internal Med [facility]   Showing today's visits and meeting all other requirements  Future Appointments  No visits were found meeting these conditions  Showing future appointments within next 150 days and meeting all other requirements     Subjective:   Breezy Farrar is a 57 y o  female who is concerned about COVID-19  Patient's symptoms include nasal congestion and cough       - Date of symptom onset: 11/27/2022      COVID-19 vaccination status: Fully vaccinated (primary series)    Exposure:   Contact with a person who is under investigation (PUI) for or who is positive for COVID-19 within the last 14 days?: Yes    Hospitalized recently for fever and/or lower respiratory symptoms?: No      Currently a healthcare worker that is involved in direct patient care?: No      Works in a special setting where the risk of COVID-19 transmission may be high? (this may include long-term care, correctional and [facility] facilities; homeless shelters; assisted-living facilities and group homes ): No      Resident in a special setting where the risk of COVID-19 transmission may be high? (this may include long-term care, correctional and [facility] facilities; homeless shelters; assisted-living facilities and group homes ): No      No results found for: Isi Sanchez, [address] [facility], [facility], [address] [facility]    Review of Systems   HENT: Positive for congestion  Respiratory: Positive for cough  All other systems reviewed and are negative  Current Outpatient Medications on File Prior to Visit   Medication Sig   • Cholecalciferol (VITAMIN D3) 2000 units capsule Take 1 capsule by mouth daily   • Omega-3 Fatty Acids (FISH OIL) 1,000 mg Take 1,000 mg by mouth daily       Objective:    /82 (BP Location: Left arm, Patient Position: Sitting, Cuff Size: Adult)   Pulse 78   Temp 98 7 °F (37 1 °C) (Tympanic)   Resp 20   Ht 5' 3" (1 6 m)   Wt 57 3 kg (126 lb 6 4 oz)   SpO2 95%   BMI 22 39 kg/m²      Physical Exam  Vitals and nursing note reviewed  Constitutional:       Appearance: Normal appearance  HENT:      Head: Normocephalic and atraumatic  Cardiovascular:      Rate and Rhythm: Normal rate  Pulmonary:      Effort: Pulmonary effort is normal       Breath sounds: Normal breath sounds  No wheezing  Musculoskeletal:      Cervical back: Normal range of motion  Lymphadenopathy:      Cervical: No cervical adenopathy  Skin:     General: Skin is warm  Neurological:      General: No focal deficit present  Mental Status: She is alert and oriented to person, place, and time  Mental status is at baseline     Psychiatric:         Mood and Affect: Mood normal        Rocío Snell MD

## 2022-12-02 LAB
FLUAV RNA RESP QL NAA+PROBE: NEGATIVE
FLUBV RNA RESP QL NAA+PROBE: NEGATIVE
SARS-COV-2 RNA RESP QL NAA+PROBE: POSITIVE

## 2022-12-29 ENCOUNTER — HOSPITAL ENCOUNTER (OUTPATIENT)
Dept: NON INVASIVE DIAGNOSTICS | Facility: CLINIC | Age: 62
Discharge: HOME | End: 2022-12-29

## 2022-12-29 VITALS
WEIGHT: 126 LBS | DIASTOLIC BLOOD PRESSURE: 82 MMHG | BODY MASS INDEX: 22.32 KG/M2 | HEART RATE: 65 BPM | SYSTOLIC BLOOD PRESSURE: 126 MMHG | HEIGHT: 63 IN

## 2022-12-29 DIAGNOSIS — R01.1 MURMUR: ICD-10-CM

## 2022-12-29 LAB
AORTIC ROOT: 3.1 CM
APICAL FOUR CHAMBER EJECTION FRACTION: 77 %
ASCENDING AORTA: 3.6 CM
AV LVOT MEAN GRADIENT: 2 MMHG
AV LVOT PEAK GRADIENT: 3 MMHG
DOP CALC LVOT PEAK VEL VTI: 24.6 CM
DOP CALC LVOT PEAK VEL: 0.89 M/S
E WAVE DECELERATION TIME: 238 MS
E/A RATIO: 0.85
FRACTIONAL SHORTENING: 40 (ref 28–44)
INTERVENTRICULAR SEPTUM IN DIASTOLE (PARASTERNAL SHORT AXIS VIEW): 0.9 CM
INTERVENTRICULAR SEPTUM: 0.9 CM (ref 0.6–1.1)
LAAS-AP2: 14.6 CM2
LAAS-AP4: 15.7 CM2
LEFT ATRIUM AREA SYSTOLE SINGLE PLANE A4C: 15.4 CM2
LEFT ATRIUM SIZE: 3.6 CM
LEFT ATRIUM VOLUME INDEX (MOD BIPLANE): 25.8
LEFT INTERNAL DIMENSION IN SYSTOLE: 2.7 CM (ref 2.1–4)
LEFT VENTRICULAR INTERNAL DIMENSION IN DIASTOLE: 4.5 CM (ref 3.5–6)
LEFT VENTRICULAR POSTERIOR WALL IN END DIASTOLE: 0.9 CM
LEFT VENTRICULAR STROKE VOLUME: 67 ML
LVSV (TEICH): 67 ML
MITRAL REGURGITATION PEAK VELOCITY: 3.61 M/S
MITRAL VALVE REGURGITANT PEAK GRADIENT: 52 MMHG
MV E'TISSUE VEL-SEP: 8 CM/S
MV PEAK A VEL: 1.03 M/S
MV PEAK E VEL: 88 CM/S
MV STENOSIS PRESSURE HALF TIME: 69 MS
MV VALVE AREA P 1/2 METHOD: 3.2
RIGHT ATRIAL 2D VOLUME: 50 ML
RIGHT ATRIUM AREA SYSTOLE A4C: 17.2 CM2
RIGHT VENTRICLE ID DIMENSION: 3.1 CM
SL CV LEFT ATRIUM LENGTH A2C: 4.5 CM
SL CV PED ECHO LEFT VENTRICLE DIASTOLIC VOLUME (MOD BIPLANE) 2D: 94 ML
SL CV PED ECHO LEFT VENTRICLE SYSTOLIC VOLUME (MOD BIPLANE) 2D: 27 ML
TR MAX PG: 26 MMHG
TR PEAK VELOCITY: 2.5 M/S
TRICUSPID VALVE PEAK REGURGITATION VELOCITY: 2.53 M/S

## 2023-02-10 ENCOUNTER — HOSPITAL ENCOUNTER (OUTPATIENT)
Dept: CT IMAGING | Facility: HOSPITAL | Age: 63
Discharge: HOME | End: 2023-02-10
Attending: INTERNAL MEDICINE

## 2023-02-10 DIAGNOSIS — R00.2 PALPITATIONS: ICD-10-CM

## 2023-02-14 ENCOUNTER — TELEPHONE (OUTPATIENT)
Dept: CARDIOLOGY CLINIC | Facility: CLINIC | Age: 63
End: 2023-02-14

## 2023-02-14 NOTE — TELEPHONE ENCOUNTER
Patient called & would like a call back with CT coronary calcium score results.     Call back [phone]

## 2023-06-11 ENCOUNTER — HOSPITAL ENCOUNTER (EMERGENCY)
Facility: HOSPITAL | Age: 63
Discharge: HOME | End: 2023-06-11
Attending: EMERGENCY MEDICINE
Payer: COMMERCIAL

## 2023-06-11 ENCOUNTER — APPOINTMENT (EMERGENCY)
Dept: CT IMAGING | Facility: HOSPITAL | Age: 63
End: 2023-06-11
Payer: COMMERCIAL

## 2023-06-11 VITALS
BODY MASS INDEX: 22.32 KG/M2 | DIASTOLIC BLOOD PRESSURE: 78 MMHG | WEIGHT: 126 LBS | HEART RATE: 59 BPM | RESPIRATION RATE: 18 BRPM | HEIGHT: 63 IN | OXYGEN SATURATION: 97 % | SYSTOLIC BLOOD PRESSURE: 162 MMHG | TEMPERATURE: 99.5 F

## 2023-06-11 DIAGNOSIS — I10 HIGH BLOOD PRESSURE: ICD-10-CM

## 2023-06-11 DIAGNOSIS — M21.372 LEFT FOOT DROP: Primary | ICD-10-CM

## 2023-06-11 LAB
ALBUMIN SERPL BCP-MCNC: 4.5 G/DL (ref 3.5–5)
ALP SERPL-CCNC: 100 U/L (ref 34–104)
ALT SERPL W P-5'-P-CCNC: 40 U/L (ref 7–52)
ANION GAP SERPL CALCULATED.3IONS-SCNC: 9 MMOL/L (ref 4–13)
APTT PPP: 26 SECONDS (ref 23–37)
AST SERPL W P-5'-P-CCNC: 26 U/L (ref 13–39)
ATRIAL RATE: 170 BPM
BASOPHILS # BLD AUTO: 0.02 THOUSANDS/ÂΜL (ref 0–0.1)
BASOPHILS NFR BLD AUTO: 1 % (ref 0–1)
BILIRUB DIRECT SERPL-MCNC: 0.16 MG/DL (ref 0–0.2)
BILIRUB SERPL-MCNC: 0.85 MG/DL (ref 0.2–1)
BUN SERPL-MCNC: 8 MG/DL (ref 5–25)
CALCIUM SERPL-MCNC: 9.9 MG/DL (ref 8.4–10.2)
CARDIAC TROPONIN I PNL SERPL HS: 3 NG/L
CHLORIDE SERPL-SCNC: 108 MMOL/L (ref 96–108)
CO2 SERPL-SCNC: 26 MMOL/L (ref 21–32)
CREAT SERPL-MCNC: 0.67 MG/DL (ref 0.6–1.3)
EOSINOPHIL # BLD AUTO: 0.06 THOUSAND/ÂΜL (ref 0–0.61)
EOSINOPHIL NFR BLD AUTO: 2 % (ref 0–6)
ERYTHROCYTE [DISTWIDTH] IN BLOOD BY AUTOMATED COUNT: 13.2 % (ref 11.6–15.1)
GFR SERPL CREATININE-BSD FRML MDRD: 93 ML/MIN/1.73SQ M
GLUCOSE SERPL-MCNC: 107 MG/DL (ref 65–140)
HCT VFR BLD AUTO: 42.3 % (ref 34.8–46.1)
HGB BLD-MCNC: 13.7 G/DL (ref 11.5–15.4)
IMM GRANULOCYTES # BLD AUTO: 0.01 THOUSAND/UL (ref 0–0.2)
IMM GRANULOCYTES NFR BLD AUTO: 0 % (ref 0–2)
INR PPP: 0.91 (ref 0.84–1.19)
LYMPHOCYTES # BLD AUTO: 0.97 THOUSANDS/ÂΜL (ref 0.6–4.47)
LYMPHOCYTES NFR BLD AUTO: 24 % (ref 14–44)
MCH RBC QN AUTO: 29.8 PG (ref 26.8–34.3)
MCHC RBC AUTO-ENTMCNC: 32.4 G/DL (ref 31.4–37.4)
MCV RBC AUTO: 92 FL (ref 82–98)
MONOCYTES # BLD AUTO: 0.17 THOUSAND/ÂΜL (ref 0.17–1.22)
MONOCYTES NFR BLD AUTO: 4 % (ref 4–12)
NEUTROPHILS # BLD AUTO: 2.79 THOUSANDS/ÂΜL (ref 1.85–7.62)
NEUTS SEG NFR BLD AUTO: 69 % (ref 43–75)
NRBC BLD AUTO-RTO: 0 /100 WBCS
P AXIS: 50 DEGREES
PLATELET # BLD AUTO: 257 THOUSANDS/UL (ref 149–390)
PMV BLD AUTO: 8.9 FL (ref 8.9–12.7)
POTASSIUM SERPL-SCNC: 3.6 MMOL/L (ref 3.5–5.3)
PROT SERPL-MCNC: 7.4 G/DL (ref 6.4–8.4)
PROTHROMBIN TIME: 12.1 SECONDS (ref 11.6–14.5)
QRS AXIS: 69 DEGREES
QRSD INTERVAL: 88 MS
QT INTERVAL: 442 MS
QTC INTERVAL: 448 MS
RBC # BLD AUTO: 4.6 MILLION/UL (ref 3.81–5.12)
SODIUM SERPL-SCNC: 143 MMOL/L (ref 135–147)
T WAVE AXIS: 61 DEGREES
VENTRICULAR RATE: 62 BPM
WBC # BLD AUTO: 4.02 THOUSAND/UL (ref 4.31–10.16)

## 2023-06-11 PROCEDURE — 85025 COMPLETE CBC W/AUTO DIFF WBC: CPT | Performed by: EMERGENCY MEDICINE

## 2023-06-11 PROCEDURE — 85730 THROMBOPLASTIN TIME PARTIAL: CPT | Performed by: EMERGENCY MEDICINE

## 2023-06-11 PROCEDURE — 93010 ELECTROCARDIOGRAM REPORT: CPT | Performed by: INTERNAL MEDICINE

## 2023-06-11 PROCEDURE — 93005 ELECTROCARDIOGRAM TRACING: CPT

## 2023-06-11 PROCEDURE — 70498 CT ANGIOGRAPHY NECK: CPT

## 2023-06-11 PROCEDURE — 80076 HEPATIC FUNCTION PANEL: CPT | Performed by: EMERGENCY MEDICINE

## 2023-06-11 PROCEDURE — 84484 ASSAY OF TROPONIN QUANT: CPT | Performed by: EMERGENCY MEDICINE

## 2023-06-11 PROCEDURE — 85610 PROTHROMBIN TIME: CPT | Performed by: EMERGENCY MEDICINE

## 2023-06-11 PROCEDURE — 80048 BASIC METABOLIC PNL TOTAL CA: CPT | Performed by: EMERGENCY MEDICINE

## 2023-06-11 PROCEDURE — 99285 EMERGENCY DEPT VISIT HI MDM: CPT

## 2023-06-11 PROCEDURE — 36415 COLL VENOUS BLD VENIPUNCTURE: CPT | Performed by: EMERGENCY MEDICINE

## 2023-06-11 PROCEDURE — 70496 CT ANGIOGRAPHY HEAD: CPT

## 2023-06-11 RX ADMIN — IOHEXOL 85 ML: 350 INJECTION, SOLUTION INTRAVENOUS at 12:17

## 2023-06-11 NOTE — ED PROVIDER NOTES
History  Chief Complaint   Patient presents with   • Hypertension     Pt states her BP was 873 systolic twice yesterday, also c/o left leg weakness since yesterday  58 yo female with HTN and HLD who presents to ED with difficulty walking since yesterday  States she is unable to lift her L foot  Constant since onset, still present  No headache, neck pain or any additional neurologic complaints  No leg pain or trauma or knee injury or leg swelling  No h/o similar sxs in past  No h/o afib or cva  Prior to Admission Medications   Prescriptions Last Dose Informant Patient Reported? Taking? Cholecalciferol (VITAMIN D3) 2000 units capsule  Self Yes No   Sig: Take 1 capsule by mouth daily   Omega-3 Fatty Acids (FISH OIL) 1,000 mg  Self Yes No   Sig: Take 1,000 mg by mouth daily      Facility-Administered Medications: None       Past Medical History:   Diagnosis Date   • Anemia    • Aortic valve disease    • Breast cancer (HCC)    • Cardiac murmur    • Hyperlipidemia        Past Surgical History:   Procedure Laterality Date   • BREAST SURGERY      lumpectomy   • COLONOSCOPY     • [place] ESOPHAGOGASTRODUODENOSCOPY TRANSORAL DIAGNOSTIC N/A 2/19/2019    Procedure: ESOPHAGOGASTRODUODENOSCOPY (EGD); Surgeon: Micah Carrizales MD;  Location: [facility]; Service: Gastroenterology       Family History   Problem Relation Age of Onset   • No Known Problems Mother    • Hypertension Father      I have reviewed and agree with the history as documented  E-Cigarette/Vaping   • E-Cigarette Use Never User      E-Cigarette/Vaping Substances   • Nicotine No    • THC No    • CBD No    • Flavoring No    • Other No    • Unknown No      Social History     Tobacco Use   • Smoking status: Never   • Smokeless tobacco: Never   Vaping Use   • Vaping Use: Never used   Substance Use Topics   • Alcohol use: Not Currently     Comment: social   • Drug use: No       Review of Systems   Musculoskeletal: Positive for gait problem  Neurological: Positive for weakness  Physical Exam  Physical Exam  Vitals and nursing note reviewed  Constitutional:       General: She is not in acute distress  Appearance: Normal appearance  She is well-developed  She is not ill-appearing, toxic-appearing or diaphoretic  HENT:      Head: Normocephalic and atraumatic  Eyes:      Conjunctiva/sclera: Conjunctivae normal       Pupils: Pupils are equal, round, and reactive to light  Neck:      Vascular: No JVD  Cardiovascular:      Rate and Rhythm: Normal rate and regular rhythm  Heart sounds: Normal heart sounds  Pulmonary:      Effort: Pulmonary effort is normal  No respiratory distress  Breath sounds: Normal breath sounds  No stridor  Abdominal:      General: There is no distension  Palpations: Abdomen is soft  Tenderness: There is no abdominal tenderness  There is no guarding or rebound  Musculoskeletal:         General: No tenderness or deformity  Normal range of motion  Cervical back: Normal range of motion and neck supple  Skin:     General: Skin is warm and dry  Capillary Refill: Capillary refill takes less than 2 seconds  Coloration: Skin is not jaundiced or pale  Findings: No bruising, erythema, lesion or rash  Neurological:      Mental Status: She is alert and oriented to person, place, and time  Cranial Nerves: No cranial nerve deficit  Sensory: No sensory deficit  Motor: No abnormal muscle tone  Coordination: Coordination normal       Comments: There is focal isolated weakness of the L foot, she is unable to dorsiflex against resistance  No other leg weakness  nihss 0            Vital Signs  ED Triage Vitals [06/11/23 1054]   Temperature Pulse Respirations Blood Pressure SpO2   99 5 °F (37 5 °C) 67 18 (!) 187/90 97 %      Temp Source Heart Rate Source Patient Position - Orthostatic VS BP Location FiO2 (%)   Temporal Monitor -- Left arm --      Pain Score       -- Vitals:    06/11/23 1054 06/11/23 1145 06/11/23 1200   BP: (!) 187/90 144/82 162/78   Pulse: 67 60 59         Visual Acuity      ED Medications  Medications   iohexol (OMNIPAQUE) 350 MG/ML injection (MULTI-DOSE) 85 mL (85 mL Intravenous Given 6/11/23 1217)       Diagnostic Studies  Results Reviewed     Procedure Component Value Units Date/Time    HS Troponin I 4hr [[id number]]     Lab Status: No result Specimen: Blood     HS Troponin I 2hr [[id number]]     Lab Status: No result Specimen: Blood     HS Troponin 0hr (reflex protocol) [[id number]]  (Normal) Collected: 06/11/23 1136    Lab Status: Final result Specimen: Blood from Arm, Right Updated: 06/11/23 1209     hs TnI 0hr 3 ng/L     Basic metabolic panel [[id number]] Collected: 06/11/23 1136    Lab Status: Final result Specimen: Blood from Arm, Right Updated: 06/11/23 1201     Sodium 143 mmol/L      Potassium 3 6 mmol/L      Chloride 108 mmol/L      CO2 26 mmol/L      ANION GAP 9 mmol/L      BUN 8 mg/dL      Creatinine 0 67 mg/dL      Glucose 107 mg/dL      Calcium 9 9 mg/dL      eGFR 93 ml/min/1 73sq m     Narrative:      Donal guidelines for Chronic Kidney Disease (CKD):   •  Stage 1 with normal or high GFR (GFR > 90 mL/min/1 73 square meters)  •  Stage 2 Mild CKD (GFR = 60-89 mL/min/1 73 square meters)  •  Stage 3A Moderate CKD (GFR = 45-59 mL/min/1 73 square meters)  •  Stage 3B Moderate CKD (GFR = 30-44 mL/min/1 73 square meters)  •  Stage 4 Severe CKD (GFR = 15-29 mL/min/1 73 square meters)  •  Stage 5 End Stage CKD (GFR <15 mL/min/1 73 square meters)  Note: GFR calculation is accurate only with a steady state creatinine    Hepatic function panel [[id number]]  (Normal) Collected: 06/11/23 1136    Lab Status: Final result Specimen: Blood from Arm, Right Updated: 06/11/23 1201     Total Bilirubin 0 85 mg/dL      Bilirubin, Direct 0 16 mg/dL      Alkaline Phosphatase 100 U/L      AST 26 U/L      ALT 40 U/L      Total Protein 7 4 g/dL      Albumin 4 5 g/dL     Protime-INR [[id number]]  (Normal) Collected: 06/11/23 1136    Lab Status: Final result Specimen: Blood from Arm, Right Updated: 06/11/23 1155     Protime 12 1 seconds      INR 0 91    APTT [[id number]]  (Normal) Collected: 06/11/23 1136    Lab Status: Final result Specimen: Blood from Arm, Right Updated: 06/11/23 1155     PTT 26 seconds     CBC and differential [[id number]]  (Abnormal) Collected: 06/11/23 1136    Lab Status: Final result Specimen: Blood from Arm, Right Updated: 06/11/23 1143     WBC 4 02 Thousand/uL      RBC 4 60 Million/uL      Hemoglobin 13 7 g/dL      Hematocrit 42 3 %      MCV 92 fL      MCH 29 8 pg      MCHC 32 4 g/dL      RDW 13 2 %      MPV 8 9 fL      Platelets 103 Thousands/uL      nRBC 0 /100 WBCs      Neutrophils Relative 69 %      Immat GRANS % 0 %      Lymphocytes Relative 24 %      Monocytes Relative 4 %      Eosinophils Relative 2 %      Basophils Relative 1 %      Neutrophils Absolute 2 79 Thousands/µL      Immature Grans Absolute 0 01 Thousand/uL      Lymphocytes Absolute 0 97 Thousands/µL      Monocytes Absolute 0 17 Thousand/µL      Eosinophils Absolute 0 06 Thousand/µL      Basophils Absolute 0 02 Thousands/µL                  CTA head and neck with and without contrast   Final Result by Wil Reyes DO (06/11 1327)      CT brain: Mild chronic microangiopathic change with no acute intracranial abnormality  CT angiography: Fibromuscular dysplasia is noted within the cervical internal carotid arteries right worse than left with no high-grade stenosis or dissection  No intracranial aneurysm identified  Incidentally noted small infundibula at the origin of the posterior communicating artery bilaterally                    Workstation performed: [id number]                    Procedures  ECG 12 Lead Documentation Only    Date/Time: 6/11/2023 11:07 AM    Performed by: Maria L Kauffman MD  Authorized by: Maria L Kauffman MD    Indications / Diagnosis:  Htn  ECG reviewed by me, the ED Provider: yes    Patient location:  ED  Previous ECG:     Previous ECG:  Unavailable  Interpretation:     Interpretation: normal    Rate:     ECG rate:  62    ECG rate assessment: normal    Rhythm:     Rhythm: sinus rhythm    Comments:      Normal ekg  ED Course  ED Course as of 06/11/23 1349   Sun Jun 11, 2023   1313 I discussed diagnostic limitations of CTA with pt to r/o acute stroke and I recommended admission for further stroke evaluation and MRI, pt provides informed refusal of admission and clearly demonstrates capacity at this time  I advised her to take ASA daily, call pcp tomorrow to schedule MRI brain, come back to ED if new/worsening/changing sxs or if she changes her mind about admission  Medical Decision Making  High blood pressure: acute illness or injury  Left foot drop: complicated acute illness or injury that poses a threat to life or bodily functions     Details: ddx includes stroke  pt aware of this  she refuses admission  Amount and/or Complexity of Data Reviewed  Labs: ordered  Radiology: ordered  Risk  Prescription drug management  Disposition  Final diagnoses:   Left foot drop   High blood pressure     Time reflects when diagnosis was documented in both MDM as applicable and the Disposition within this note     Time User Action Codes Description Comment    6/11/2023  1:19 PM Freeda Res Add [M21 372] Left foot drop     6/11/2023  1:19 PM Blaise Friedman Add [I10] High blood pressure       ED Disposition     ED Disposition   AMA    Condition   --    Date/Time   Sun Jun 11, 2023  1:20 PM    Comment   Date: 6/11/2023  Patient: Santhosh Martinez  Admitted: 6/11/2023 11:02 AM  Attending Provider: Isabel Byrd MD    Rossyvalorie Ok or her authorized caregiver has made the decision for the patient to leave the emergency department against the advice of her atte nding physician   She or her "authorized caregiver has been informed and understands the inherent risks, including death, stroke, permanent neurologic damage  She or her authorized caregiver has decided to accept the responsibility for this decision  Katerina Duffy and all necessary parties have been advised that she may return for further evaluation or treatment  Her condition at time of discharge was stable    Katerina Duffy had current vital signs as follows:  /78   Pulse 59   Temp 99 5 °F (37  5 °C) (Temporal)   Resp 18   Ht 5' 3\" (1 6 m)   Wt 57 2 kg (126 lb)            Follow-up Information     Follow up With Specialties Details Why Arnaldo Jensen MD Internal Medicine Call in 1 day to request an MRI of the brain to rule out a stroke [id number]  [address] [address]  [phone]            Patient's Medications   Discharge Prescriptions    No medications on file           PDMP Review     None          ED Provider  Electronically Signed by           Maria Elena Crooks MD  06/11/23 3255    "

## 2023-06-12 ENCOUNTER — OFFICE VISIT (OUTPATIENT)
Dept: INTERNAL MEDICINE CLINIC | Facility: CLINIC | Age: 63
End: 2023-06-12
Payer: COMMERCIAL

## 2023-06-12 VITALS
WEIGHT: 126 LBS | HEIGHT: 63 IN | BODY MASS INDEX: 22.32 KG/M2 | DIASTOLIC BLOOD PRESSURE: 90 MMHG | SYSTOLIC BLOOD PRESSURE: 138 MMHG | RESPIRATION RATE: 18 BRPM | HEART RATE: 67 BPM | OXYGEN SATURATION: 99 %

## 2023-06-12 DIAGNOSIS — I10 PRIMARY HYPERTENSION: Primary | ICD-10-CM

## 2023-06-12 DIAGNOSIS — M21.372 LEFT FOOT DROP: ICD-10-CM

## 2023-06-12 DIAGNOSIS — I77.3 FIBROMUSCULAR DYSPLASIA (HCC): ICD-10-CM

## 2023-06-12 DIAGNOSIS — R29.898 WEAKNESS OF EXTREMITY: ICD-10-CM

## 2023-06-12 DIAGNOSIS — R29.898 LEFT LEG WEAKNESS: ICD-10-CM

## 2023-06-12 DIAGNOSIS — E78.2 MIXED HYPERLIPIDEMIA: ICD-10-CM

## 2023-06-12 PROBLEM — R10.11 RUQ PAIN: Status: RESOLVED | Noted: 2019-02-15 | Resolved: 2023-06-12

## 2023-06-12 PROBLEM — R00.2 PALPITATIONS: Status: RESOLVED | Noted: 2018-04-11 | Resolved: 2023-06-12

## 2023-06-12 PROCEDURE — 99214 OFFICE O/P EST MOD 30 MIN: CPT | Performed by: INTERNAL MEDICINE

## 2023-06-12 RX ORDER — LISINOPRIL 5 MG/1
5 TABLET ORAL DAILY
Qty: 90 TABLET | Refills: 3 | Status: SHIPPED | OUTPATIENT
Start: 2023-06-12 | End: 2023-06-13

## 2023-06-12 RX ORDER — ATORVASTATIN CALCIUM 10 MG/1
40 TABLET, FILM COATED ORAL DAILY
Qty: 90 TABLET | Refills: 3 | Status: SHIPPED | OUTPATIENT
Start: 2023-06-12 | End: 2023-06-12

## 2023-06-12 RX ORDER — ASPIRIN 81 MG/1
81 TABLET ORAL DAILY
Qty: 90 TABLET | Refills: 3
Start: 2023-06-12

## 2023-06-12 NOTE — PROGRESS NOTES
Assessment/Plan:    Patient is here for ER follow-up  She had gone yesterday with complaints of high blood pressure and was taken there found to be 164/90, and she was noted to have left foot drop  She denied any other issues like headache, vision changes, any other weakness, right foot drop, urinary leakage, chest pain, palpitations  CT of the head and CTA were unremarkable except for fibromuscular dysplasia over the right carotid artery, but no significant stenoses  CT head showed chronic microangiopathic changes  She decided to leave [facility] and did not want to get any further imaging and was told to follow-up with me  She has an isolated foot drop on exam, unable to dorsiflex  Also tender to lumbar spine  Says she has been feeling well within the past few days  She has been gardening and thinks she was bit by a few bugs  We will obtain an MRI of the brain, bilateral carotid artery study, discussed continuing a baby aspirin, starting statin, and starting lisinopril for hypertension, attain an EMG, and lumbar x-ray     We have follow-up scheduled in 1 week  Quality Measures:     Depression Screening and Follow-up Plan: Clincally patient does not have depression  No treatment is required  Return for Next scheduled follow up  No problem-specific Assessment & Plan notes found for this encounter  Diagnoses and all orders for this visit:    Primary hypertension  -     MRI brain wo contrast; Future  -     lisinopril (ZESTRIL) 5 mg tablet; Take 1 tablet (5 mg total) by mouth daily    Left foot drop  -     MRI brain wo contrast; Future  -     aspirin (Aspirin 81) 81 mg EC tablet; Take 1 tablet (81 mg total) by mouth daily  -     EMG 1 Limb; Future  -     XR spine lumbar minimum 4 views non injury; Future    Weakness of extremity  -     MRI brain wo contrast; Future    Left leg weakness  -     MRI brain wo contrast; Future  -     aspirin (Aspirin 81) 81 mg EC tablet;  Take 1 tablet (81 mg total) by mouth daily  -     EMG 1 Limb; Future  -     XR spine lumbar minimum 4 views non injury; Future    Mixed hyperlipidemia  -     Discontinue: atorvastatin (LIPITOR) 10 mg tablet; Take 4 tablets (40 mg total) by mouth daily    Fibromuscular dysplasia (HCC)  -     VAS carotid complete study; Future          Subjective:      Patient ID: Austin Young is a 57 y o  female  Here for ER f/u  ALLERGIES:  No Known Allergies    CURRENT MEDICATIONS:    Current Outpatient Medications:   •  aspirin (Aspirin 81) 81 mg EC tablet, Take 1 tablet (81 mg total) by mouth daily, Disp: 90 tablet, Rfl: 3  •  Cholecalciferol (VITAMIN D3) 2000 units capsule, Take 1 capsule by mouth daily, Disp: , Rfl:   •  lisinopril (ZESTRIL) 5 mg tablet, Take 1 tablet (5 mg total) by mouth daily, Disp: 90 tablet, Rfl: 3  •  Omega-3 Fatty Acids (FISH OIL) 1,000 mg, Take 1,000 mg by mouth daily, Disp: , Rfl:   •  atorvastatin (LIPITOR) 40 mg tablet, Take 1 tablet (40 mg total) by mouth daily, Disp: 30 tablet, Rfl: 5  No current facility-administered medications for this visit  ACTIVE PROBLEM LIST:  Patient Active Problem List   Diagnosis   • Aortic valve disease   • Hyperlipidemia       PAST MEDICAL HISTORY:  Past Medical History:   Diagnosis Date   • Anemia    • Aortic valve disease    • Breast cancer ([facility] [address] )    • Cardiac murmur    • Hyperlipidemia        PAST SURGICAL HISTORY:  Past Surgical History:   Procedure Laterality Date   • BREAST SURGERY      lumpectomy   • COLONOSCOPY     • [place] ESOPHAGOGASTRODUODENOSCOPY TRANSORAL DIAGNOSTIC N/A 2/19/2019    Procedure: ESOPHAGOGASTRODUODENOSCOPY (EGD); Surgeon: Shasta Varma MD;  Location: [facility];   Service: Gastroenterology       FAMILY HISTORY:  Family History   Problem Relation Age of Onset   • No Known Problems Mother    • Hypertension Father        SOCIAL HISTORY:  Social History     Socioeconomic History   • Marital status: /Civil Union     Spouse name: Not on file   • Number "of children: Not on file   • Years of education: Not on file   • Highest education level: Not on file   Occupational History   • Not on file   Tobacco Use   • Smoking status: Never   • Smokeless tobacco: Never   Vaping Use   • Vaping Use: Never used   Substance and Sexual Activity   • Alcohol use: Not Currently     Comment: social   • Drug use: No   • Sexual activity: Yes   Other Topics Concern   • Not on file   Social History Narrative   • Not on file     Social Determinants of Health     Financial Resource Strain: Not on file   Food Insecurity: Not on file   Transportation Needs: Not on file   Physical Activity: Not on file   Stress: Not on file   Social Connections: Not on file   Intimate Partner Violence: Not on file   Housing Stability: Not on file       Review of Systems   Constitutional: Negative for chills and fever  HENT: Negative for ear pain and sore throat  Eyes: Negative for pain and visual disturbance  Respiratory: Negative for cough and shortness of breath  Cardiovascular: Negative for chest pain and palpitations  Gastrointestinal: Negative for abdominal pain and vomiting  Genitourinary: Negative for dysuria and hematuria  Musculoskeletal: Negative for arthralgias and back pain  Skin: Negative for color change and rash  Neurological: Positive for weakness  Negative for seizures and syncope  Left dorsiflexion weakness   All other systems reviewed and are negative  Objective:  Vitals:    06/12/23 0913 06/12/23 0948   BP: 124/84 138/90   BP Location: Right arm    Patient Position: Sitting    Cuff Size: Standard    Pulse: 67    Resp: 18    SpO2: 99%    Weight: 57 2 kg (126 lb)    Height: 5' 3\" (1 6 m)      Body mass index is 22 32 kg/m²  Physical Exam  Vitals and nursing note reviewed  Constitutional:       Appearance: Normal appearance  She is obese  HENT:      Head: Normocephalic and atraumatic     Cardiovascular:      Rate and Rhythm: Normal rate and regular " rhythm  Pulmonary:      Effort: Pulmonary effort is normal    Musculoskeletal:         General: Normal range of motion  Cervical back: Normal range of motion  Right lower leg: No edema  Left lower leg: No edema  Left foot: Foot drop present  Skin:     General: Skin is warm and dry  Neurological:      Mental Status: She is alert and oriented to person, place, and time  Gait: Gait abnormal and tandem walk abnormal    Psychiatric:         Mood and Affect: Mood normal            RESULTS:    In chart    This note was created with voice recognition software  Phonic, grammatical and spelling errors may be present within the note as a result

## 2023-06-13 ENCOUNTER — OFFICE VISIT (OUTPATIENT)
Dept: CARDIOLOGY CLINIC | Facility: CLINIC | Age: 63
End: 2023-06-13
Payer: COMMERCIAL

## 2023-06-13 ENCOUNTER — HOSPITAL ENCOUNTER (OUTPATIENT)
Dept: RADIOLOGY | Facility: HOSPITAL | Age: 63
Discharge: HOME | End: 2023-06-13
Payer: COMMERCIAL

## 2023-06-13 VITALS
WEIGHT: 127 LBS | HEART RATE: 58 BPM | DIASTOLIC BLOOD PRESSURE: 96 MMHG | RESPIRATION RATE: 16 BRPM | OXYGEN SATURATION: 97 % | BODY MASS INDEX: 22.5 KG/M2 | SYSTOLIC BLOOD PRESSURE: 158 MMHG | HEIGHT: 63 IN

## 2023-06-13 DIAGNOSIS — M21.372 LEFT FOOT DROP: ICD-10-CM

## 2023-06-13 DIAGNOSIS — I10 PRIMARY HYPERTENSION: Primary | ICD-10-CM

## 2023-06-13 DIAGNOSIS — E78.5 HYPERLIPIDEMIA, UNSPECIFIED HYPERLIPIDEMIA TYPE: ICD-10-CM

## 2023-06-13 DIAGNOSIS — R09.89 BRUIT OF LEFT CAROTID ARTERY: ICD-10-CM

## 2023-06-13 DIAGNOSIS — R29.898 LEFT LEG WEAKNESS: ICD-10-CM

## 2023-06-13 PROCEDURE — 99213 OFFICE O/P EST LOW 20 MIN: CPT | Performed by: INTERNAL MEDICINE

## 2023-06-13 PROCEDURE — 72110 X-RAY EXAM L-2 SPINE 4/>VWS: CPT

## 2023-06-13 RX ORDER — LISINOPRIL AND HYDROCHLOROTHIAZIDE 12.5; 1 MG/1; MG/1
1 TABLET ORAL DAILY
Qty: 30 TABLET | Refills: 5 | Status: SHIPPED | OUTPATIENT
Start: 2023-06-13

## 2023-06-13 NOTE — PROGRESS NOTES
Cardiology Follow Up    Ijeoma Lara  1960  [id number]  [facility] PAUL Carpio [address] [place] [address] [address] [place] [address] [phone]    1  Primary hypertension  -     lisinopril-hydrochlorothiazide (Zestoretic) 10-12 5 MG per tablet; Take 1 tablet by mouth daily  -     Basic metabolic panel; Future; Expected date: 06/27/2023    2  Left foot drop    3  Bruit of left carotid artery    4  Hyperlipidemia, unspecified hyperlipidemia type          Interval History: 77-year-old lady with hyperlipidemia, aortic sclerosis murmur, coronary calcium score of 0, who developed a left foot drop  She had been squatting all day long working on the deck and developed this afterwards  It appears to be getting better    She was seen at the hospital   She is waiting to see a neurologist     Patient Active Problem List   Diagnosis   • Aortic valve disease   • Hyperlipidemia   • Primary hypertension   • Left foot drop     Past Medical History:   Diagnosis Date   • Anemia    • Aortic valve disease    • Breast cancer ([facility] [address] )    • Cardiac murmur    • Hyperlipidemia      Social History     Socioeconomic History   • Marital status: /Civil Union     Spouse name: Not on file   • Number of children: Not on file   • Years of education: Not on file   • Highest education level: Not on file   Occupational History   • Not on file   Tobacco Use   • Smoking status: Never   • Smokeless tobacco: Never   Vaping Use   • Vaping Use: Never used   Substance and Sexual Activity   • Alcohol use: Not Currently     Comment: social   • Drug use: No   • Sexual activity: Yes   Other Topics Concern   • Not on file   Social History Narrative   • Not on file     Social Determinants of Health     Financial Resource Strain: Not on file   Food Insecurity: Not on file   Transportation Needs: Not on file   Physical Activity: Not on file   Stress: Not on file Social Connections: Not on file   Intimate Partner Violence: Not on file   Housing Stability: Not on file      Family History   Problem Relation Age of Onset   • No Known Problems Mother    • Hypertension Father      Past Surgical History:   Procedure Laterality Date   • BREAST SURGERY      lumpectomy   • COLONOSCOPY     • MD ESOPHAGOGASTRODUODENOSCOPY TRANSORAL DIAGNOSTIC N/A 2/19/2019    Procedure: ESOPHAGOGASTRODUODENOSCOPY (EGD); Surgeon: Zita Page MD;  Location: [facility];   Service: Gastroenterology       Current Outpatient Medications:   •  aspirin (Aspirin 81) 81 mg EC tablet, Take 1 tablet (81 mg total) by mouth daily, Disp: 90 tablet, Rfl: 3  •  atorvastatin (LIPITOR) 40 mg tablet, Take 1 tablet (40 mg total) by mouth daily, Disp: 30 tablet, Rfl: 5  •  Cholecalciferol (VITAMIN D3) 2000 units capsule, Take 1 capsule by mouth daily, Disp: , Rfl:   •  lisinopril-hydrochlorothiazide (Zestoretic) 10-12 5 MG per tablet, Take 1 tablet by mouth daily, Disp: 30 tablet, Rfl: 5  •  Omega-3 Fatty Acids (FISH OIL) 1,000 mg, Take 1,000 mg by mouth daily, Disp: , Rfl:   No Known Allergies    Labs:  Admission on 06/11/2023, Discharged on 06/11/2023   Component Date Value   • Ventricular Rate 06/11/2023 62    • Atrial Rate 06/11/2023 170    • QRSD Interval 06/11/2023 88    • QT Interval 06/11/2023 442    • QTC Interval 06/11/2023 448    • P Axis 06/11/2023 50    • QRS Axis 06/11/2023 69    • T Wave Axis 06/11/2023 61    • WBC 06/11/2023 4 02 (L)    • RBC 06/11/2023 4 60    • Hemoglobin 06/11/2023 13 7    • Hematocrit 06/11/2023 42 3    • MCV 06/11/2023 92    • MCH 06/11/2023 29 8    • MCHC 06/11/2023 32 4    • RDW 06/11/2023 13 2    • MPV 06/11/2023 8 9    • Platelets 94/64/2890 257    • nRBC 06/11/2023 0    • Neutrophils Relative 06/11/2023 69    • Immat GRANS % 06/11/2023 0    • Lymphocytes Relative 06/11/2023 24    • Monocytes Relative 06/11/2023 4    • Eosinophils Relative 06/11/2023 2    • Basophils Relative 06/11/2023 1    • Neutrophils Absolute 06/11/2023 2 79    • Immature Grans Absolute 06/11/2023 0 01    • Lymphocytes Absolute 06/11/2023 0 97    • Monocytes Absolute 06/11/2023 0 17    • Eosinophils Absolute 06/11/2023 0 06    • Basophils Absolute 06/11/2023 0 02    • Protime 06/11/2023 12 1    • INR 06/11/2023 0 91    • PTT 06/11/2023 26    • Sodium 06/11/2023 143    • Potassium 06/11/2023 3 6    • Chloride 06/11/2023 108    • CO2 06/11/2023 26    • ANION GAP 06/11/2023 9    • BUN 06/11/2023 8    • Creatinine 06/11/2023 0 67    • Glucose 06/11/2023 107    • Calcium 06/11/2023 9 9    • eGFR 06/11/2023 93    • Total Bilirubin 06/11/2023 0 85    • Bilirubin, Direct 06/11/2023 0 16    • Alkaline Phosphatase 06/11/2023 100    • AST 06/11/2023 26    • ALT 06/11/2023 40    • Total Protein 06/11/2023 7 4    • Albumin 06/11/2023 4 5    • hs TnI 0hr 06/11/2023 3      Imaging: CTA head and neck with and without contrast    Result Date: 6/11/2023  Narrative: CTA NECK AND BRAIN WITH AND WITHOUT CONTRAST INDICATION: evaluate for stroke  L leg weakness since yesterday COMPARISON:   None  TECHNIQUE:  Routine CT imaging of the Brain without contrast   Post contrast imaging was performed after administration of iodinated contrast through the neck and brain  Post contrast axial 0 625 mm images timed to opacify the arterial system  3D rendering was performed on an independent workstation  MIP reconstructions performed  Coronal reconstructions were performed of the noncontrast portion of the brain  Radiation dose length product (DLP) for this visit:  1288 mGy-cm   This examination, like all CT scans performed in the [facility], was performed utilizing techniques to minimize radiation dose exposure, including the use of iterative reconstruction and automated exposure control   IV Contrast:  85 mL of iohexol (OMNIPAQUE) IMAGE QUALITY:   Diagnostic FINDINGS: NONCONTRAST BRAIN PARENCHYMA:  No intracranial mass, mass effect or midline shift  No CT signs of acute infarction  No acute parenchymal hemorrhage  VENTRICLES AND EXTRA-AXIAL SPACES:  Normal for the patient's age  VISUALIZED ORBITS: Normal visualized orbits  PARANASAL SINUSES: Normal visualized paranasal sinuses  CERVICAL VASCULATURE AORTIC ARCH AND GREAT VESSELS:  Normal aortic arch and great vessel origins  Normal visualized subclavian vessels  RIGHT VERTEBRAL ARTERY CERVICAL SEGMENT:  Normal origin  The vessel is normal in caliber throughout the neck  LEFT VERTEBRAL ARTERY CERVICAL SEGMENT:  Normal origin  The vessel is normal in caliber throughout the neck  RIGHT EXTRACRANIAL CAROTID SEGMENT: No atherosclerotic disease of the common carotid artery or proximal cervical internal carotid artery  There is a beaded appearance of the cervical internal carotid artery diffusely above the bulb to the skull base suggestive of fibromuscular dysplasia  No evidence of dissection  LEFT EXTRACRANIAL CAROTID SEGMENT: Normal distal common carotid artery and internal carotid artery bulb  Subtle irregularity involving the mid cervical internal carotid artery extending into the distal portion of the vessel similar to the opposite right side but to a lesser degree consistent with fibromuscular dysplasia  No dissection or pseudoaneurysm  NASCET criteria was used to determine the degree of internal carotid artery diameter stenosis  INTRACRANIAL VASCULATURE INTERNAL CAROTID ARTERIES: No stenosis of the intracranial internal carotid arteries  Mild asymmetry with the left side being smaller than the right as a result of the absent severely hypoplastic left A1 segment  Small infundibula are noted at the origin  of the posterior communicating artery bilaterally  ANTERIOR CIRCULATION: Severely hypoplastic left A1 segment  Large right A1 segment  Normal anterior communicating artery  Both A2 and A3 segments are unremarkable   MIDDLE CEREBRAL ARTERY CIRCULATION:  M1 segment and middle cerebral artery branches demonstrate normal enhancement bilaterally  DISTAL VERTEBRAL ARTERIES:  Normal distal vertebral arteries  Posterior inferior cerebellar artery origins are normal  Normal vertebral basilar junction  BASILAR ARTERY:  Basilar artery is normal in caliber  Normal superior cerebellar arteries  POSTERIOR CEREBRAL ARTERIES: Both posterior cerebral arteries arises from the basilar tip  Both arteries demonstrate normal enhancement  As described above there is an infundibulum at the origin of the posterior communicating artery bilaterally  VENOUS STRUCTURES:  Normal  NON VASCULAR ANATOMY BONY STRUCTURES: Mild cervical degenerative change with mild smooth reversal of the normal cervical lordosis  There is fusion of the facets on the right at C3-4  SOFT TISSUES OF THE NECK: Subcentimeter nodules are identified within the thyroid gland bilaterally  Incidental discovery of one or more thyroid nodule(s) measuring less than 1 5 cm and without suspicious features is noted in this patient who is above 28years old; according to guidelines published in the February 2015 white paper on incidental thyroid nodules in the Journal of the [employer] of Radiology [facility]), no further evaluation is recommended  THORACIC INLET:  Normal      Impression: CT brain: Mild chronic microangiopathic change with no acute intracranial abnormality  CT angiography: Fibromuscular dysplasia is noted within the cervical internal carotid arteries right worse than left with no high-grade stenosis or dissection  No intracranial aneurysm identified  Incidentally noted small infundibula at the origin of the posterior communicating artery bilaterally  Workstation performed: [id number]       Review of Systems:  Review of Systems    Physical Exam:  158/96 (140s at home)  Thanks let carotid bruits  Lungs clear  Short grade 2 systolic ejection murmur at the base  Weakness of dorsiflexion of the left toes  Discussion/Summary:    1  Diagnoses as noted above    Recommendations:    1  Change lisinopril to Zestoretic  2  Record blood pressures at home  3  Neurology follow-up for foot drop which appears to be resolving  4  Ultrasound of carotids is pending  5    Return 3 months      Damaris Child MD

## 2023-06-15 ENCOUNTER — EVALUATION (OUTPATIENT)
Dept: PHYSICAL THERAPY | Facility: CLINIC | Age: 63
End: 2023-06-15
Payer: COMMERCIAL

## 2023-06-15 DIAGNOSIS — M21.372 LEFT FOOT DROP: Primary | ICD-10-CM

## 2023-06-15 PROCEDURE — 97161 PT EVAL LOW COMPLEX 20 MIN: CPT | Performed by: PHYSICAL THERAPIST

## 2023-06-15 PROCEDURE — 97112 NEUROMUSCULAR REEDUCATION: CPT | Performed by: PHYSICAL THERAPIST

## 2023-06-15 RX ORDER — METHYLPREDNISOLONE 4 MG/1
TABLET ORAL
Qty: 21 EACH | Refills: 0 | Status: ON HOLD | OUTPATIENT
Start: 2023-06-15

## 2023-06-15 NOTE — PROGRESS NOTES
PT Evaluation     Today's date: 6/15/2023  Patient name: Nathan Lopez  : 1960  MRN: [id number]  Referring provider: Roz Arroyo MD  Dx:   Encounter Diagnosis     ICD-10-CM    1  Left foot drop  M21 372 Ambulatory Referral to Physical Therapy          Start Time: 1100  Stop Time: 1145  Total time in clinic (min): 45 minutes    Assessment/Plan     Nathan Lopez is a 57 y o  female who was referred to physical therapy for management of L foot drop from possible compression injury  Primary impairments include decreased dorsiflexion ROM and abnormal gait pattern  Consequently, patient has difficulty completing ADLs including ambulation, stair climbing, squatting, etc   Parker Martin would benefit from skilled intervention to address all deficits and improve functional capability  Patient is a good candidate for therapy, pending compliance with HEP and 2x weekly participation  Thank you for the referral and please do not hesitate to contact me with any questions or concerns regarding Debra’s care! Plan  Frequency: 1-2x per week   Duration in weeks: 8  POC start date: 6/15/23  POC end date: 8/10/23   Therapeutic exercise/activity, neuromuscular reeducation, manual therapy, and modalities  Patient understands and agrees to plan of care  Goals  Short Term--4 weeks  1  Patient will demonstrate independence with HEP  2  Dorsiflexion ROM > 3/5    Long Term--By Discharge  1  Patient will achieve expected FOTO score  2  Patient will demonstrate normal gait pattern with community ambulation  3  Patient will safely navigate flight of steps utilizing reciprocal step pattern and no use of rail assist      Patient's Goal: Be able to safely traverse uneven terrain and do activities outside  Subjective     History   Date of Onset: 23  Description: Sudden onset of L foot drop which caused patient to fall  Earlier that day patient was in and out of low squat position over 4 hour time span sanding her deck  "Patient reports that she was wearing heavy rubber boots that went up to her knee, and the back of the boot was compressing into her calf  Patient went to the ER where stroke was ruled out  She followed up with her family physician, who also ordered a radiograph of her lumbar spine and head MRI  Symptoms  Loss of dorsiflexion strength in her L ankle  Patient denies any associated pain  She has history of intermittent LBP but is unsure if she has any associated pain with this  Pain at best: 0  Pain at worst: 0    Social History  Dayla Boast lives with her  in a multistory home  Objective    GAIT: antalgic gait with decreased R step length and premature flat foot lift off during L late stance  SLS:  LLE: unable        MMT    Hip       L       R   Flex  5 5   Extn  5 5   Abd  NT NT                 MMT    Knee         L        R   Flex  5 5   Extn  5 5                MMT    Ankle       L        R   PF 5 5   DF  2* 5   Inv  4 5   Ever  5 5     *Following nerve glides and PPUs, DF strength improved to 3- (gradual release from test position)           Precautions:   Past Medical History:   Diagnosis Date   • Anemia    • Aortic valve disease    • Breast cancer (HCC)    • Cardiac murmur    • Hyperlipidemia      Access Code: [email or web address]  URL: [email or web address]  Date: 06/15/2023  Prepared by: Rosa Isela Odonnell    Exercises  - Prone Press Up  - 5 x daily - 1-2 sets - 10 reps  - Supine Sciatic Nerve Glide  - 2 x daily - 2 sets - 10 reps      Manuals 6/15            Place and hold DF 10x5\"                                                   Neuro Re-Ed             Prone/RUSLAN 2'/3'            PPUs 10x            Peroneal nerve glides supine 2x10             NMES ant tib?                                                                  Ther Ex                                                                                                                     Ther Activity                                     " Gait Training                                       Modalities

## 2023-06-21 ENCOUNTER — OFFICE VISIT (OUTPATIENT)
Dept: PHYSICAL THERAPY | Facility: CLINIC | Age: 63
End: 2023-06-21
Payer: COMMERCIAL

## 2023-06-21 DIAGNOSIS — M21.372 LEFT FOOT DROP: Primary | ICD-10-CM

## 2023-06-21 PROCEDURE — 97112 NEUROMUSCULAR REEDUCATION: CPT | Performed by: PHYSICAL THERAPIST

## 2023-06-21 PROCEDURE — 97140 MANUAL THERAPY 1/> REGIONS: CPT | Performed by: PHYSICAL THERAPIST

## 2023-06-21 NOTE — PROGRESS NOTES
"Daily Note     Today's date: 2023  Patient name: Douglas Salas  : 1960  MRN: [id number]  Referring provider: Mireya Gibson MD  Dx:   Encounter Diagnosis     ICD-10-CM    1  Left foot drop  M21 372           Start Time: 1415  Stop Time: 1500  Total time in clinic (min): 45 minutes    Subjective: Patient reports good improvement in dorsiflexion return since last visit  Patient picked up Prednisone from pharmacy but has not started it yet  Objective: See treatment diary below      Assessment: Patient demonstrates 3/5 DF strength, which is 1/2 point improvement compared to last week  Muscle contraction is now observable, and she is able to maintain an iso hold for 2-3 seconds before the foot starts to drop, which is also an improvement compared to last week  Plan: Continue per plan of care  Precautions:   Past Medical History:   Diagnosis Date   • Anemia    • Aortic valve disease    • Breast cancer ([facility] [address] )    • Cardiac murmur    • Hyperlipidemia      Access Code: [email or web address]  URL: [email or web address]  Date: 06/15/2023  Prepared by: Lai Crooks    Exercises  - Prone Press Up  - 5 x daily - 1-2 sets - 10 reps  - Supine Sciatic Nerve Glide  - 2 x daily - 2 sets - 10 reps      Manuals 6/15 6/21           Place and hold DF 10x5\" 2x10x 5\"                                                   Neuro Re-Ed             Prone/RUSLAN 2'/3' /5'           PPUs 10x 3x10           Peroneal nerve glides supine 2x10  2x20           NMES ant tib?              Standing ext at counter  2x10           Stand TR (back at counter)  2x5                                     Ther Ex             [place] eliminated DF AROM  2x10           bike  6'                                                                                         Ther Activity                                       Gait Training                                       Modalities             MHP w/ prone exercises   8'                             "

## 2023-06-28 ENCOUNTER — APPOINTMENT (OUTPATIENT)
Dept: PHYSICAL THERAPY | Facility: CLINIC | Age: 63
End: 2023-06-28
Payer: COMMERCIAL

## 2023-07-04 DIAGNOSIS — E78.2 MIXED HYPERLIPIDEMIA: ICD-10-CM

## 2023-07-05 ENCOUNTER — OFFICE VISIT (OUTPATIENT)
Dept: PHYSICAL THERAPY | Facility: CLINIC | Age: 63
End: 2023-07-05
Payer: COMMERCIAL

## 2023-07-05 DIAGNOSIS — M21.372 LEFT FOOT DROP: Primary | ICD-10-CM

## 2023-07-05 PROCEDURE — 97110 THERAPEUTIC EXERCISES: CPT

## 2023-07-05 PROCEDURE — 97112 NEUROMUSCULAR REEDUCATION: CPT

## 2023-07-05 RX ORDER — ATORVASTATIN CALCIUM 40 MG/1
TABLET, FILM COATED ORAL
Qty: 90 TABLET | Refills: 2 | Status: SHIPPED | OUTPATIENT
Start: 2023-07-05

## 2023-07-05 NOTE — PROGRESS NOTES
Daily Note     Today's date: 2023  Patient name: Abimbola Washington  : 1960  MRN: [id number]  Referring provider: Waqas Aguilera MD  Dx:   Encounter Diagnosis     ICD-10-CM    1. Left foot drop  M21.372                      Subjective: pt reports feeling a lot better but still feels sore in the foot and back. Objective: See treatment diary below      Assessment: Tolerated treatment well. Patient would benefit from continued PT. Improved nerve mobility post nerve glides. Started to add core and hip strengthening w/ fatigue noted. Pt experienced cramping in BL thighs post bridges. Plan: Continue per plan of care. Precautions:   Past Medical History:   Diagnosis Date   • Anemia    • Aortic valve disease    • Breast cancer ([address])    • Cardiac murmur    • Hyperlipidemia      Access Code: [email or web address]  URL: [email or web address]  Date: 06/15/2023  Prepared by: Tevin New    Exercises  - Prone Press Up  - 5 x daily - 1-2 sets - 10 reps  - Supine Sciatic Nerve Glide  - 2 x daily - 2 sets - 10 reps      Manuals 6/15 6/21 7/5          Place and hold DF 10x5" 2x10x 5"                                                   Neuro Re-Ed             Prone/RUSLAN 2'/3' /5' /5'          PPUs 10x 3x10 3x10          Peroneal nerve glides supine 2x10  2x20 2x20          NMES ant tib?              Standing ext at counter  2x10 2x10          Stand TR (back at counter)  2x5                                     Ther Ex             [place] eliminated DF AROM  2x10           bike  6' 6'          Prone hip ext   10x ea side          bridges   2" 2x10                                                              Ther Activity                                       Gait Training                                       Modalities             MHP w/ prone exercises   8'

## 2023-07-07 ENCOUNTER — OFFICE VISIT (OUTPATIENT)
Dept: PHYSICAL THERAPY | Facility: CLINIC | Age: 63
End: 2023-07-07
Payer: COMMERCIAL

## 2023-07-07 DIAGNOSIS — M21.372 LEFT FOOT DROP: Primary | ICD-10-CM

## 2023-07-07 PROCEDURE — 97112 NEUROMUSCULAR REEDUCATION: CPT

## 2023-07-07 PROCEDURE — 97110 THERAPEUTIC EXERCISES: CPT

## 2023-07-07 NOTE — PROGRESS NOTES
Daily Note     Today's date: 2023  Patient name: Keya Doan  : 1960  MRN: [id number]  Referring provider: Sangita Mckeon MD  Dx:   Encounter Diagnosis     ICD-10-CM    1. Left foot drop  M21.372           Start Time: 930  Stop Time: 1015  Total time in clinic (min): 45 minutes    Subjective: pt reports compliance w/ HEP. Stated she felt good post previous session. Objective: See treatment diary below      Assessment: Tolerated treatment well. Patient would benefit from continued PT. Continued w/ current POC as listed below w/ good tolerance. Weakness noted w/ SLR's. Updated and reviewed HEP. Plan: Continue per plan of care. Precautions:   Past Medical History:   Diagnosis Date   • Anemia    • Aortic valve disease    • Breast cancer ([address])    • Cardiac murmur    • Hyperlipidemia      Access Code: [email or web address]  URL: [email or web address]  Date: 06/15/2023  Prepared by: Colon Skanee    Exercises  - Prone Press Up  - 5 x daily - 1-2 sets - 10 reps  - Supine Sciatic Nerve Glide  - 2 x daily - 2 sets - 10 reps      Manuals 6/15 6/21 7         Place and hold DF 10x5" 2x10x 5"                                                   Neuro Re-Ed             Prone/RUSLAN 2'/3' /5' /5' 5'         PPUs 10x 3x10 3x10 3x10         Peroneal nerve glides supine 2x10  2x20 2x20 3x10         NMES ant tib?              Standing ext at counter  2x10 2x10 2x10         Stand TR (back at counter)  2x5           TrAbrace SLR    10x                      Ther Ex             [place] eliminated DF AROM  2x10  2x10         bike  6' 6' 6'         Prone hip ext   10x ea side 10x ea side         bridges   2" 2x10 2'' 10x         Ankle 4 way    ytb 10x                                                Ther Activity                                       Gait Training                                       Modalities             MHP w/ prone exercises   8'

## 2023-07-12 ENCOUNTER — APPOINTMENT (OUTPATIENT)
Dept: PHYSICAL THERAPY | Facility: CLINIC | Age: 63
End: 2023-07-12
Payer: COMMERCIAL

## 2023-07-13 ENCOUNTER — APPOINTMENT (OUTPATIENT)
Dept: LAB | Facility: HOSPITAL | Age: 63
End: 2023-07-13
Payer: COMMERCIAL

## 2023-07-13 DIAGNOSIS — I10 PRIMARY HYPERTENSION: ICD-10-CM

## 2023-07-13 LAB
ANION GAP SERPL CALCULATED.3IONS-SCNC: 6 MMOL/L
BUN SERPL-MCNC: 12 MG/DL (ref 5–25)
CALCIUM SERPL-MCNC: 9.7 MG/DL (ref 8.4–10.2)
CHLORIDE SERPL-SCNC: 108 MMOL/L (ref 96–108)
CO2 SERPL-SCNC: 28 MMOL/L (ref 21–32)
CREAT SERPL-MCNC: 0.68 MG/DL (ref 0.6–1.3)
GFR SERPL CREATININE-BSD FRML MDRD: 92 ML/MIN/1.73SQ M
GLUCOSE P FAST SERPL-MCNC: 97 MG/DL (ref 65–99)
POTASSIUM SERPL-SCNC: 4 MMOL/L (ref 3.5–5.3)
SODIUM SERPL-SCNC: 142 MMOL/L (ref 135–147)

## 2023-07-13 PROCEDURE — 36415 COLL VENOUS BLD VENIPUNCTURE: CPT

## 2023-07-13 PROCEDURE — 80048 BASIC METABOLIC PNL TOTAL CA: CPT

## 2023-07-14 ENCOUNTER — APPOINTMENT (OUTPATIENT)
Dept: PHYSICAL THERAPY | Facility: CLINIC | Age: 63
End: 2023-07-14
Payer: COMMERCIAL

## 2023-07-21 ENCOUNTER — NURSE TRIAGE (OUTPATIENT)
Age: 63
End: 2023-07-21

## 2023-07-21 DIAGNOSIS — R11.2 NAUSEA AND VOMITING, UNSPECIFIED VOMITING TYPE: Primary | ICD-10-CM

## 2023-07-21 RX ORDER — ONDANSETRON 4 MG/1
4 TABLET, FILM COATED ORAL EVERY 6 HOURS PRN
Qty: 30 TABLET | Refills: 0 | Status: SHIPPED | OUTPATIENT
Start: 2023-07-21

## 2023-07-21 NOTE — TELEPHONE ENCOUNTER
Reason for Disposition  • Unexplained nausea     Patient states she saw primary care yesterday and they attributed symptoms to allergies, ordered medication but patient could not recall what it is (she did not  from pharmacy yet). She feels related to GI. Protocols used: NAUSEA-ADULT-OH    Last OV 11/29/22  Procedure Colon 7/16/21    Patient states symptoms started week ago, she will cough, gets nausea and then vomit , feeling gassy. She is afebrile. She has been scheduled for follow up 8/10/23 (she requests Dr. Lydia Yung only)  Per protocol Zofran 4 mg q 6 hr prn n/v if absence of fever, abdominal pain, hematemesis. Purchase Gas X or simethicone equivalent for gas. Patient instructed to go to clears for a day or two (no carbonated beverages) and then bland diet. Please sign off on order. [facility] confirmed.

## 2023-07-21 NOTE — TELEPHONE ENCOUNTER
Regarding: vomiting, cough and gassy stomach  ----- Message from Goran Wynn [facility] Rosa sent at 7/21/2023  8:49 AM EDT -----  Patient call in with complain for vomiting, cough and gassy stomach.

## 2023-07-26 ENCOUNTER — OFFICE VISIT (OUTPATIENT)
Age: 63
End: 2023-07-26
Payer: COMMERCIAL

## 2023-07-26 VITALS — HEIGHT: 63 IN | BODY MASS INDEX: 22.5 KG/M2 | WEIGHT: 127 LBS

## 2023-07-26 DIAGNOSIS — D18.01 CHERRY ANGIOMA: ICD-10-CM

## 2023-07-26 DIAGNOSIS — D22.9 NEVUS: ICD-10-CM

## 2023-07-26 DIAGNOSIS — Z13.89 SCREENING FOR SKIN CONDITION: Primary | ICD-10-CM

## 2023-07-26 DIAGNOSIS — L82.1 SEBORRHEIC KERATOSES: ICD-10-CM

## 2023-07-26 PROCEDURE — 99203 OFFICE O/P NEW LOW 30 MIN: CPT | Performed by: DERMATOLOGY

## 2023-07-26 NOTE — PATIENT INSTRUCTIONS
MELANOCYTIC NEVI ("Moles")    Melanocytic nevi ("moles") are tan or brown, raised or flat areas of the skin which have an increased number of melanocytes. Melanocytes are the cells in our body which make pigment and account for skin color. Some moles are present at birth (I.e., "congenital nevi"), while others come up later in life (i.e., "acquired nevi"). The sun can stimulate the body to make more moles. Sunburns are not the only thing that triggers more moles. Chronic sun exposure can do it too. Clinically distinguishing a healthy mole from melanoma may be difficult, even for experienced dermatologists. The "ABCDE's" of moles have been suggested as a means of helping to alert a person to a suspicious mole and the possible increased risk of melanoma. The suggestions for raising alert are as follows:    Asymmetry: Healthy moles tend to be symmetric, while melanomas are often asymmetric. Asymmetry means if you draw a line through the mole, the two halves do not match in color, size, shape, or surface texture. Asymmetry can be a result of rapid enlargement of a mole, the development of a raised area on a previously flat lesion, scaling, ulceration, bleeding or scabbing within the mole. Any mole that starts to demonstrate "asymmetry" should be examined promptly by a board certified dermatologist.     Border: Healthy moles tend to have discrete, even borders. The border of a melanoma often blends into the normal skin and does not sharply delineate the mole from normal skin. Any mole that starts to demonstrate "uneven borders" should be examined promptly by a board certified dermatologist.     Color: Healthy moles tend to be one color throughout. Melanomas tend to be made up of different colors ranging from dark black, blue, white, or red.   Any mole that demonstrates a color change should be examined promptly by a board certified dermatologist.     Diameter: Healthy moles tend to be smaller than 0.6 cm in size; an exception are "congenital nevi" that can be larger. Melanomas tend to grow and can often be greater than 0.6 cm (1/4 of an inch, or the size of a pencil eraser). This is only a guideline, and many normal moles may be larger than 0.6 cm without being unhealthy. Any mole that starts to change in size (small to bigger or bigger to smaller) should be examined promptly by a board certified dermatologist.     Evolving: Healthy moles tend to "stay the same."  Melanomas may often show signs of change or evolution such as a change in size, shape, color, or elevation. Any mole that starts to itch, bleed, crust, burn, hurt, or ulcerate or demonstrate a change or evolution should be examined promptly by a board certified dermatologist.      Dysplastic Nevi  Dysplastic moles are moles that fit the ABCDE rules of melanoma but are not identified as melanomas when examined under the microscope. They may indicate an increased risk of melanoma in that person. If there is a family history of melanoma, most experts agree that the person may be at an increased risk for developing a melanoma. Experts still do not agree on what dysplastic moles mean in patients without a personal or family history of melanoma. Dysplastic moles are usually larger than common moles and have different colors within it with irregular borders. The appearance can be very similar to a melanoma. Biopsies of dysplastic moles may show abnormalities which are different from a regular mole. Melanoma  Malignant melanoma is a type of skin cancer that can be deadly if it spreads throughout the body. The incidence of melanoma in the [facility] is growing faster than any other cancer. Melanoma usually grows near the surface of the skin for a period of time, and then begins to grow deeper into the skin. Once it grows deeper into the skin, the risk of spread to other organs greatly increases.  Therefore, early detection and removal of a malignant melanoma may result in a better chance at a complete cure; removal after the tumor has spread may not be as effective, leading to worse clinical outcomes such as death. The true rate of nevus transformation into a melanoma is unknown. It has been estimated that the lifetime risk for any acquired melanocytic nevus on any 21year-old individual transforming into melanoma by age 80 is 0.03% (1 in 3,164) for men and 0.009% (1 in 10,800) for women. The appearance of a "new mole" remains one of the most reliable methods for identifying a malignant melanoma. Occasionally, melanomas appear as rapidly growing, blue-black, dome-shaped bumps within a previous mole or previous area of normal skin. Other times, melanomas are suspected when a mole suddenly appears or changes. Itching, burning, or pain in a pigmented lesion should increase suspicion, but most patients with early melanoma have no skin discomfort whatsoever. Melanoma can occur anywhere on the skin, including areas that are difficult for self-examination. Many melanomas are first noticed by other family members. Suspicious-looking moles may be removed for microscopic examination. You may be able to prevent death from melanoma by doing two simple things:    Try to avoid unnecessary sun exposure and protect your skin when it is exposed to the sun. People who live near the equator, people who have intermittent exposures to large amounts of sun, and people who have had sunburns in childhood or adolescence have an increased risk for melanoma. Sun sense and vigilant sun protection may be keys to helping to prevent melanoma. We recommend wearing UPF-rated sun protective clothing and sunglasses whenever possible and applying a moisturizer-sunscreen combination product (SPF 50+) such as Neutrogena Daily Defense to sun exposed areas of skin at least three times a day.     Have your moles regularly examined by a board certified dermatologist AND by yourself or a family member/friend at home. We recommend that you have your moles examined at least once a year by a board certified dermatologist.  Use your birthday as an annual reminder to have your "Birthday Suit" (I.e., your skin) examined; it is a nice birthday gift to yourself to know that your skin is healthy appearing! Additionally, at-home self examinations may be helpful for detecting a possible melanoma. Use the ABCDEs we discussed and check your moles once a month at home. SEBORRHEIC KERATOSIS  A seborrheic keratosis is a harmless warty spot that appears during adult life as a common sign of skin aging. Seborrheic keratoses can arise on any area of skin, covered or uncovered, with the usual exception of the palms and soles. They do not arise from mucous membranes. Seborrheic keratoses can have highly variable appearance. Seborrheic keratoses are extremely common. It has been estimated that over 90% of adults over the age of 61 years have one or more of them. They occur in males and females of all races, typically beginning to erupt in the 35s or 45s. They are uncommon under the age of 21 years. The precise cause of seborrhoeic keratoses is not known. Seborrhoeic keratoses are considered degenerative in nature. As time goes by, seborrheic keratoses tend to become more numerous. Some people inherit a tendency to develop a very large number of them; some people may have hundreds of them. The name "seborrheic keratosis" is misleading, because these lesions are not limited to a seborrhoeic distribution (scalp, mid-face, chest, upper back), nor are they formed from sebaceous glands, nor are they associated with sebum -- which is greasy. Seborrheic keratosis may also be called "SK," "Seb K," "basal cell papilloma," "senile wart," or "barnacle."      There is no easy way to remove multiple lesions on a single occasion.   Unless a specific lesion is "inflamed" and is causing pain or stinging/burning or is bleeding, most insurance companies do not authorize treatment. ANGIOMA ("CHERRY ANGIOMA")  Zamudio angiomas markedly increase in number from about the age of 36, so it has been estimated that 75% of people over 76years of age have them. Although they also called "senile angiomas," they can occur in young people too - 5% of adolescents have been found to have them. Cherry angiomas are very common in males and females of any age or race, with no difference in sexes or races affected. They are however more noticeable in white skin than in skin of colour. There may be a family history of similar lesions. Eruptive (very large number appearing in a short period of time) cherry angiomas have been rarely reported to be associated with internal malignancy and pregnancy.

## 2023-07-26 NOTE — PROGRESS NOTES
[facility] Dermatology [facility] Note     Patient Name: Anahi Donnelly  Encounter Date: July 26, 2023     Have you been cared for by a [facility] Dermatologist in the last 3 years and, if so, which description applies to you? NO. I am considered a "new" patient and must complete all patient intake questions. I am FEMALE/of child-bearing potential.    REVIEW OF SYSTEMS:  Have you recently had or currently have any of the following? · Recent fever or chills? No  · Any non-healing wound? No  · Are you pregnant or planning to become pregnant? No  · Are you currently or planning to be nursing or breast feeding? No   PAST MEDICAL HISTORY:  Have you personally ever had or currently have any of the following? If "YES," then please provide more detail. · Skin cancer (such as Melanoma, Basal Cell Carcinoma, Squamous Cell Carcinoma? No  · Tuberculosis, HIV/AIDS, Hepatitis B or C: No  · Systemic Immunosuppression such as Diabetes, Biologic or Immunotherapy, Chemotherapy, Organ Transplantation, Bone Marrow Transplantation No  · Radiation Treatment No   FAMILY HISTORY:  Any "first degree relatives" (parent, brother, sister, or child) with the following? • Skin Cancer, Pancreatic or Other Cancer? No   PATIENT EXPERIENCE:    • Do you want the Dermatologist to perform a COMPLETE skin exam today including a clinical examination under the "bra and underwear" areas? Yes  • If necessary, do we have your permission to call and leave a detailed message on your Preferred Phone number that includes your specific medical information?   Yes      No Known Allergies   Current Outpatient Medications:   •  aspirin (Aspirin 81) 81 mg EC tablet, Take 1 tablet (81 mg total) by mouth daily, Disp: 90 tablet, Rfl: 3  •  atorvastatin (LIPITOR) 40 mg tablet, TAKE 1 TABLET BY MOUTH EVERY DAY, Disp: 90 tablet, Rfl: 2  •  Cholecalciferol (VITAMIN D3) 2000 units capsule, Take 1 capsule by mouth daily, Disp: , Rfl:   •  lisinopril-hydrochlorothiazide (PRINZIDE,ZESTORETIC) 10-12.5 MG per tablet, TAKE 1 TABLET BY MOUTH EVERY DAY, Disp: 90 tablet, Rfl: 3  •  methylPREDNISolone 4 MG tablet therapy pack, Use as directed on package, Disp: 21 each, Rfl: 0  •  Omega-3 Fatty Acids (FISH OIL) 1,000 mg, Take 1,000 mg by mouth daily, Disp: , Rfl:   •  ondansetron (ZOFRAN) 4 mg tablet, Take 1 tablet (4 mg total) by mouth every 6 (six) hours as needed for nausea or vomiting, Disp: 30 tablet, Rfl: 0       57year old female presents for overall check up concerned regarding lesion on Left temple  • Whom besides the patient is providing clinical information about today's encounter?   o NO ADDITIONAL HISTORIAN (patient alone provided history)    Physical Exam and Assessment/Plan by Diagnosis:    MELANOCYTIC NEVI ("Moles")    Physical Exam:  • Anatomic Location Affected: Mostly on sun-exposed areas of the body. • Morphological Description:  Scattered, 1-4mm round to ovoid, symmetrical-appearing, even bordered, skin colored to dark brown macules/papules, mostly in sun-exposed areas  • Pertinent Positives:  • Pertinent Negatives: Additional History of Present Condition:      Assessment and Plan:  Based on a thorough discussion of this condition and the management approach to it (including a comprehensive discussion of the known risks, side effects and potential benefits of treatment), the patient (family) agrees to implement the following specific plan:  • Provided handout with information regarding the ABCDE's of moles   • Recommend routine skin exams every year. • Sun avoidance, protective clothing (known as UPF clothing), and the use of at least SPF 30 sunscreens is advised. Sunscreen should be reapplied every two hours when outside.        SEBORRHEIC KERATOSIS; NON-INFLAMED    Physical Exam:  • Anatomic Location Affected:  scattered across trunk, extremities, face  • Morphological Description:  Flat and raised, waxy, smooth to warty textured, yellow to brownish-grey to dark brown to blackish, discrete, "stuck-on" appearing papules. • Pertinent Positives:  • Pertinent Negatives: Additional History of Present Condition:  Patient reports new bumps on the skin. Denies itch, burn, pain, bleeding or ulceration. Present constantly; nothing seems to make it worse or better. No prior treatment. Assessment and Plan:  Based on a thorough discussion of this condition and the management approach to it (including a comprehensive discussion of the known risks, side effects and potential benefits of treatment), the patient (family) agrees to implement the following specific plan:  • Reassured benign        ANGIOMA ("CHERRY ANGIOMA")    Physical Exam:  • Anatomic Location: scattered across sun exposed areas of the trunk and extremities   • Morphologic Description: Firm red to reddish-blue discrete papules  • Pertinent Positives:  • Pertinent Negatives:     Additional History of Present Condition:  Present on exam.     Assessment and Plan:  • Reassured benign      Scribe Attestation    I,:  Tye Cruz MA am acting as a scribe while in the presence of the attending physician.:       I,:  Nicci De Jesus MD personally performed the services described in this documentation    as scribed in my presence.:

## 2023-08-01 ENCOUNTER — OFFICE VISIT (OUTPATIENT)
Dept: FAMILY MEDICINE CLINIC | Facility: CLINIC | Age: 63
End: 2023-08-01
Payer: COMMERCIAL

## 2023-08-01 VITALS
TEMPERATURE: 97.6 F | HEART RATE: 55 BPM | OXYGEN SATURATION: 98 % | DIASTOLIC BLOOD PRESSURE: 76 MMHG | BODY MASS INDEX: 22.5 KG/M2 | SYSTOLIC BLOOD PRESSURE: 132 MMHG | HEIGHT: 63 IN | WEIGHT: 127 LBS

## 2023-08-01 DIAGNOSIS — I10 PRIMARY HYPERTENSION: ICD-10-CM

## 2023-08-01 DIAGNOSIS — I35.9 AORTIC VALVE DISEASE: ICD-10-CM

## 2023-08-01 DIAGNOSIS — M21.372 LEFT FOOT DROP: Primary | ICD-10-CM

## 2023-08-01 DIAGNOSIS — E78.5 HYPERLIPIDEMIA, UNSPECIFIED HYPERLIPIDEMIA TYPE: ICD-10-CM

## 2023-08-01 PROCEDURE — 99204 OFFICE O/P NEW MOD 45 MIN: CPT | Performed by: STUDENT IN AN ORGANIZED HEALTH CARE EDUCATION/TRAINING PROGRAM

## 2023-08-01 RX ORDER — LOSARTAN POTASSIUM AND HYDROCHLOROTHIAZIDE 12.5; 5 MG/1; MG/1
1 TABLET ORAL DAILY
Qty: 45 TABLET | Refills: 0 | Status: SHIPPED | OUTPATIENT
Start: 2023-08-01

## 2023-08-01 NOTE — PROGRESS NOTES
Assessment/Plan:         Problem List Items Addressed This Visit        Cardiovascular and Mediastinum    Aortic valve disease     Reviewed cardio note         Primary hypertension     Switch to losartan/hctz. Do believe chronic cough may be 2/2 lisinopril induced cough         Relevant Medications    losartan-hydrochlorothiazide (HYZAAR) 50-12.5 mg per tablet    Other Relevant Orders    Lipid panel    TSH, 3rd generation with Free T4 reflex       Other    Hyperlipidemia     conitnue lipitor, repeat lipid panel due. Reviewed prior lab from 2022         Relevant Orders    Lipid panel    Lipid panel    Left foot drop - Primary     resolving           Stared quickly after the lisinopril was added, stop this and do losratan/hctz and should see quick improvement    Subjective:      Patient ID: Alice Matos is a 57 y.o. female. HPI    Patient coming in to be seen for cough. Started in early June states she has tried over-the-counter medications for cough and antihistamines without relief. Reports she feels a chronic tickle in her throat and is not improving. Does believe she was started on blood pressure medicine around that time as well. History of left foot drop but thinks this was from wearing too she is tight of shoes that are up around her knee and this is improving.     The following portions of the patient's history were reviewed and updated as appropriate:   Past Medical History:  She has a past medical history of Anemia, Aortic valve disease, Breast cancer ([address]), Cardiac murmur, and Hyperlipidemia.,  _______________________________________________________________________  Medical Problems:  does not have any pertinent problems on file.,  _______________________________________________________________________  Past Surgical History:   has a past surgical history that includes Breast surgery; pr esophagogastroduodenoscopy transoral diagnostic (N/A, 2/19/2019); and Colonoscopy. ,  _______________________________________________________________________  Family History:  family history includes Hypertension in her father; No Known Problems in her mother.,  _______________________________________________________________________  Social History:   reports that she has never smoked. She has never used smokeless tobacco. She reports that she does not currently use alcohol. She reports that she does not use drugs. ,  _______________________________________________________________________  Allergies:  has No Known Allergies. .  _______________________________________________________________________  Current Outpatient Medications   Medication Sig Dispense Refill   • aspirin (Aspirin 81) 81 mg EC tablet Take 1 tablet (81 mg total) by mouth daily 90 tablet 3   • atorvastatin (LIPITOR) 40 mg tablet TAKE 1 TABLET BY MOUTH EVERY DAY 90 tablet 2   • Cholecalciferol (VITAMIN D3) 2000 units capsule Take 1 capsule by mouth daily     • losartan-hydrochlorothiazide (HYZAAR) 50-12.5 mg per tablet Take 1 tablet by mouth daily 45 tablet 0   • Omega-3 Fatty Acids (FISH OIL) 1,000 mg Take 1,000 mg by mouth daily     • ondansetron (ZOFRAN) 4 mg tablet Take 1 tablet (4 mg total) by mouth every 6 (six) hours as needed for nausea or vomiting 30 tablet 0   • methylPREDNISolone 4 MG tablet therapy pack Use as directed on package (Patient not taking: Reported on 8/1/2023) 21 each 0     No current facility-administered medications for this visit.     _______________________________________________________________________  Review of Systems   Constitutional: Negative for chills, fatigue and fever. HENT: Negative for rhinorrhea and sore throat. Eyes: Negative for visual disturbance. Respiratory: Positive for cough. Negative for shortness of breath. Cardiovascular: Negative for chest pain and palpitations. Gastrointestinal: Negative for abdominal pain, constipation, diarrhea, nausea and vomiting. Genitourinary: Negative for difficulty urinating, dysuria and frequency. Musculoskeletal: Negative for arthralgias and myalgias. Skin: Negative for color change and rash. Neurological: Negative for weakness and headaches. Objective:  Vitals:    08/01/23 1425   BP: 132/76   BP Location: Left arm   Patient Position: Sitting   Cuff Size: Standard   Pulse: 55   Temp: 97.6 °F (36.4 °C)   SpO2: 98%   Weight: 57.6 kg (127 lb)   Height: 5' 3" (1.6 m)     Body mass index is 22.5 kg/m². Physical Exam  Constitutional:       General: She is not in acute distress. Appearance: She is not ill-appearing. HENT:      Head: Normocephalic and atraumatic. Right Ear: External ear normal.      Left Ear: External ear normal.      Nose: Nose normal. No congestion or rhinorrhea. Mouth/Throat:      Mouth: Mucous membranes are moist.      Pharynx: Oropharynx is clear. No oropharyngeal exudate or posterior oropharyngeal erythema. Eyes:      Extraocular Movements: Extraocular movements intact. Conjunctiva/sclera: Conjunctivae normal.      Pupils: Pupils are equal, round, and reactive to light. Cardiovascular:      Rate and Rhythm: Normal rate and regular rhythm. Pulses: Normal pulses. Heart sounds: No murmur heard. Pulmonary:      Effort: Pulmonary effort is normal. No respiratory distress. Breath sounds: Normal breath sounds. No wheezing. Chest:      Chest wall: No tenderness. Abdominal:      General: Bowel sounds are normal.      Palpations: Abdomen is soft. Tenderness: There is no abdominal tenderness. Musculoskeletal:         General: Normal range of motion. Cervical back: Normal range of motion. Skin:     General: Skin is warm and dry. Capillary Refill: Capillary refill takes less than 2 seconds. Findings: No rash. Neurological:      General: No focal deficit present. Mental Status: She is alert. Mental status is at baseline.

## 2023-08-02 ENCOUNTER — APPOINTMENT (OUTPATIENT)
Dept: LAB | Facility: HOSPITAL | Age: 63
End: 2023-08-02
Payer: COMMERCIAL

## 2023-08-02 DIAGNOSIS — I10 PRIMARY HYPERTENSION: ICD-10-CM

## 2023-08-02 DIAGNOSIS — E78.5 HYPERLIPIDEMIA, UNSPECIFIED HYPERLIPIDEMIA TYPE: ICD-10-CM

## 2023-08-02 LAB
CHOLEST SERPL-MCNC: 148 MG/DL
HDLC SERPL-MCNC: 69 MG/DL
LDLC SERPL CALC-MCNC: 64 MG/DL (ref 0–100)
NONHDLC SERPL-MCNC: 79 MG/DL
TRIGL SERPL-MCNC: 74 MG/DL
TSH SERPL DL<=0.05 MIU/L-ACNC: 0.88 UIU/ML (ref 0.45–4.5)

## 2023-08-02 PROCEDURE — 36415 COLL VENOUS BLD VENIPUNCTURE: CPT

## 2023-08-02 PROCEDURE — 84443 ASSAY THYROID STIM HORMONE: CPT

## 2023-08-02 PROCEDURE — 80061 LIPID PANEL: CPT

## 2023-08-09 ENCOUNTER — TELEPHONE (OUTPATIENT)
Age: 63
End: 2023-08-09

## 2023-08-09 NOTE — TELEPHONE ENCOUNTER
Caller: Patient    Doctor: none    Reason for call: Following up with a call from Dang Castellanos, she does not know who called, there are no messages.     Call back#: n/a

## 2023-08-24 DIAGNOSIS — I10 PRIMARY HYPERTENSION: ICD-10-CM

## 2023-08-24 RX ORDER — LOSARTAN POTASSIUM AND HYDROCHLOROTHIAZIDE 12.5; 5 MG/1; MG/1
1 TABLET ORAL DAILY
Qty: 90 TABLET | Refills: 1 | Status: SHIPPED | OUTPATIENT
Start: 2023-08-24

## 2023-08-25 ENCOUNTER — RA CDI HCC (OUTPATIENT)
Dept: OTHER | Facility: HOSPITAL | Age: 63
End: 2023-08-25

## 2023-08-25 NOTE — PROGRESS NOTES
[address] [facility] coding opportunities       Chart reviewed, no opportunity found: CHART REVIEWED, NO OPPORTUNITY FOUND        Patients Insurance        Commercial Insurance: [address]

## 2023-08-28 ENCOUNTER — OFFICE VISIT (OUTPATIENT)
Dept: FAMILY MEDICINE CLINIC | Facility: CLINIC | Age: 63
End: 2023-08-28
Payer: COMMERCIAL

## 2023-08-28 VITALS
HEIGHT: 63 IN | SYSTOLIC BLOOD PRESSURE: 130 MMHG | HEART RATE: 57 BPM | BODY MASS INDEX: 22.01 KG/M2 | OXYGEN SATURATION: 96 % | TEMPERATURE: 97.4 F | WEIGHT: 124.2 LBS | DIASTOLIC BLOOD PRESSURE: 76 MMHG

## 2023-08-28 DIAGNOSIS — M25.511 ACUTE PAIN OF RIGHT SHOULDER: ICD-10-CM

## 2023-08-28 DIAGNOSIS — R05.2 SUBACUTE COUGH: Primary | ICD-10-CM

## 2023-08-28 PROCEDURE — 99214 OFFICE O/P EST MOD 30 MIN: CPT | Performed by: STUDENT IN AN ORGANIZED HEALTH CARE EDUCATION/TRAINING PROGRAM

## 2023-08-28 NOTE — PROGRESS NOTES
Assessment/Plan:         Problem List Items Addressed This Visit    None  Visit Diagnoses     Subacute cough    -  Primary    Acute pain of right shoulder            For the right shoulder discussed exercises and stretching. Can also take over-the-counter anti-inflammatories. For the cough likely caused from postnasal drip recommend over-the-counter antihistamines      Subjective:      Patient ID: Nora Ludwig is a 57 y.o. female. HPI    Patient coming for follow-up. Her cough has improved since switching from lisinopril to losartan but still present. She reports she has a tickle in her throat    Also has right shoulder pain. Denies any known trauma. Most movements are quite painful    The following portions of the patient's history were reviewed and updated as appropriate:   Past Medical History:  She has a past medical history of Anemia, Aortic valve disease, Breast cancer ([address]), Cardiac murmur, and Hyperlipidemia.,  _______________________________________________________________________  Medical Problems:  does not have any pertinent problems on file.,  _______________________________________________________________________  Past Surgical History:   has a past surgical history that includes Breast surgery; pr esophagogastroduodenoscopy transoral diagnostic (N/A, 2/19/2019); and Colonoscopy. ,  _______________________________________________________________________  Family History:  family history includes Hypertension in her father; No Known Problems in her mother.,  _______________________________________________________________________  Social History:   reports that she has never smoked. She has never used smokeless tobacco. She reports that she does not currently use alcohol. She reports that she does not use drugs. ,  _______________________________________________________________________  Allergies:  has No Known Allergies. .  _______________________________________________________________________  Current Outpatient Medications   Medication Sig Dispense Refill   • aspirin (Aspirin 81) 81 mg EC tablet Take 1 tablet (81 mg total) by mouth daily 90 tablet 3   • atorvastatin (LIPITOR) 40 mg tablet TAKE 1 TABLET BY MOUTH EVERY DAY 90 tablet 2   • Cholecalciferol (VITAMIN D3) 2000 units capsule Take 1 capsule by mouth daily     • losartan-hydrochlorothiazide (HYZAAR) 50-12.5 mg per tablet TAKE 1 TABLET BY MOUTH EVERY DAY 90 tablet 1   • Omega-3 Fatty Acids (FISH OIL) 1,000 mg Take 1,000 mg by mouth daily     • ondansetron (ZOFRAN) 4 mg tablet Take 1 tablet (4 mg total) by mouth every 6 (six) hours as needed for nausea or vomiting 30 tablet 0   • methylPREDNISolone 4 MG tablet therapy pack Use as directed on package (Patient not taking: Reported on 8/1/2023) 21 each 0     No current facility-administered medications for this visit.     _______________________________________________________________________  Review of Systems   Constitutional: Negative for activity change, appetite change, fatigue and fever. HENT: Negative for congestion, rhinorrhea and sore throat. Eyes: Negative for visual disturbance. Respiratory: Positive for cough. Negative for shortness of breath. Cardiovascular: Negative for chest pain. Gastrointestinal: Negative for nausea and vomiting. Musculoskeletal: Positive for arthralgias. Objective:  Vitals:    08/28/23 1247   BP: 130/76   BP Location: Left arm   Patient Position: Sitting   Cuff Size: Standard   Pulse: 57   Temp: (!) 97.4 °F (36.3 °C)   SpO2: 96%   Weight: 56.3 kg (124 lb 3.2 oz)   Height: 5' 3" (1.6 m)     Body mass index is 22 kg/m². Physical Exam  Constitutional:       General: She is not in acute distress. Appearance: Normal appearance. She is not ill-appearing. HENT:      Head: Normocephalic and atraumatic.       Right Ear: External ear normal.      Left Ear: External ear normal.      Nose: Congestion and rhinorrhea present. Mouth/Throat:      Pharynx: No oropharyngeal exudate or posterior oropharyngeal erythema. Cardiovascular:      Rate and Rhythm: Normal rate. Pulmonary:      Effort: Pulmonary effort is normal. No respiratory distress. Musculoskeletal:      Right shoulder: Tenderness and crepitus present. Cervical back: Normal range of motion. Neurological:      Mental Status: She is alert.

## 2023-09-12 ENCOUNTER — OFFICE VISIT (OUTPATIENT)
Age: 63
End: 2023-09-12
Payer: COMMERCIAL

## 2023-09-12 VITALS
WEIGHT: 123 LBS | OXYGEN SATURATION: 97 % | BODY MASS INDEX: 21.79 KG/M2 | DIASTOLIC BLOOD PRESSURE: 70 MMHG | SYSTOLIC BLOOD PRESSURE: 122 MMHG | HEIGHT: 63 IN | HEART RATE: 60 BPM

## 2023-09-12 DIAGNOSIS — R10.31 RIGHT LOWER QUADRANT ABDOMINAL PAIN: ICD-10-CM

## 2023-09-12 DIAGNOSIS — R14.0 BLOATING: ICD-10-CM

## 2023-09-12 DIAGNOSIS — R10.13 EPIGASTRIC PAIN: ICD-10-CM

## 2023-09-12 DIAGNOSIS — R63.4 WEIGHT LOSS: ICD-10-CM

## 2023-09-12 DIAGNOSIS — R11.0 NAUSEA: ICD-10-CM

## 2023-09-12 DIAGNOSIS — R10.32 LEFT LOWER QUADRANT ABDOMINAL PAIN: Primary | ICD-10-CM

## 2023-09-12 PROCEDURE — 99214 OFFICE O/P EST MOD 30 MIN: CPT | Performed by: INTERNAL MEDICINE

## 2023-09-12 NOTE — PATIENT INSTRUCTIONS
Scheduled date of EGD(as of today): 10/25/23  Physician performing EGD: Coleman Pitts  Location of EGD: [facility]  Instructions reviewed with patient by: Rosaria Brittle T  Clearances:

## 2023-09-12 NOTE — PROGRESS NOTES
[facility]      Chief Complaint: Abdominal discomfort    HPI:  Cholo Rose is a 57 y.o.  female who presents with several weeks to months of epigastric discomfort and fullness. She feels a discomfort in the middle of her back. She does not characterize it as bad pain but rather a sensation of fullness. She did have nausea but no longer does. She has had several pounds weight loss. She has no early satiety. No dysphagia or odynophagia. No melena or hematochezia. She has intermittent abdominal pain in both right and left lower quadrant. There is no definitive exacerbating or remitting factor. She gets hunger pains but really has no appetite. She is taking Nexium with some partial relief. However she is taking it after her dinner. She has no other associated symptomatology. No chest pain or shortness of breath. This may be due to a situation at home which has produced a significant amount of stress involving illness and a loved 1. She has no other complaints of any kind at this time. .      Review of Systems:   Constitutional: No fever or chills, feels well, no tiredness, no recent weight gain or weight loss. HENT: No complaints of earache, no hearing loss, no nosebleeds, no nasal discharge, no sore throat, no hoarseness. Eyes: No complaints of eye pain, no red eyes, no discharge from eyes, no itchy eyes. Cardiovascular: No complaints of slow heart rate, no fast heart rate, no chest pain, no palpitations, no leg claudication, no lower extremity edema. Respiratory: No complaints of shortness of breath, no wheezing, no cough, no SOB on exertion, no orthopnea. Gastrointestinal: As noted in HPI  Genitourinary: No complaints of dysuria, no incontinence, no hesitancy, no nocturia. Musculoskeletal: No complaints of arthralgia, no myalgias, no joint swelling or stiffness, no limb pain or swelling.    Neurological: No complaints of headache, no confusion, no convulsions, no numbness or tingling, no dizziness or fainting, no limb weakness, no difficulty walking. Skin: No complaints of skin rash or skin lesions, no itching, no skin wound, no dry skin. Hematological/Lymphatic: No complaints of swollen glands, does not bleed easy. Allergic/Immunologic: No immunocompromised state. Endocrine:  No complaints of polyuria, no polydipsia. Psychiatric/Behavioral: is not suicidal, no sleep disturbances, no anxiety or depression, no change in personality, no emotional problems. Historical Information   Past Medical History:   Diagnosis Date   • Anemia    • Aortic valve disease    • Breast cancer ([address])    • Cardiac murmur    • Hyperlipidemia      Past Surgical History:   Procedure Laterality Date   • BREAST SURGERY      lumpectomy   • COLONOSCOPY     • [place] ESOPHAGOGASTRODUODENOSCOPY TRANSORAL DIAGNOSTIC N/A 2/19/2019    Procedure: ESOPHAGOGASTRODUODENOSCOPY (EGD); Surgeon: Joe Guajardo MD;  Location: [facility]; Service: Gastroenterology     Social History   Social History     Substance and Sexual Activity   Alcohol Use Not Currently    Comment: social     Social History     Substance and Sexual Activity   Drug Use No     Social History     Tobacco Use   Smoking Status Never   Smokeless Tobacco Never     Family History   Problem Relation Age of Onset   • No Known Problems Mother    • Hypertension Father          Current Medications: has a current medication list which includes the following prescription(s): aspirin, atorvastatin, vitamin d3, losartan-hydrochlorothiazide, fish oil, ondansetron, and methylprednisolone. Vital Signs: /70   Pulse 60   Ht 5' 3" (1.6 m)   Wt 55.8 kg (123 lb)   SpO2 97%   BMI 21.79 kg/m²       Physical Exam:   Constitutional  General Appearance: No acute distress, well appearing and well nourished  Head  Normocephalic  Eyes  Conjunctivae and lids: No swelling, erythema, or discharge.     Pupils and irises: Equal, round and reactive to light.   Ears, Nose, Mouth, and Throat  External inspection of ears and nose: Normal  Nasal mucosa, septum and turbinates: Normal without edema or erythema/   Oropharynx: Normal with no erythema, edema, exudate or lesions. Neck  Normal range of motion. Neck supple. Cardiovascular  Auscultation of the heart: Normal rate and rhythm, normal S1 and S2 without murmurs. Examination of the extremities for edema and/or varicosities: Normal  Pulmonary/Chest  Respiratory effort: No increased work of breathing or signs of respiratory distress. Auscultation of lungs: Clear to auscultation, equal breath sounds bilaterally, no wheezes, rales, no rhonchi. Abdomen  Abdomen: Non-tender, no masses. Liver and spleen: No hepatomegaly or splenomegaly. Musculoskeletal  Gait and station: normal.  Digits and Nails: normal without clubbing or cyanosis. Inspection/palpation of joints, bones, and muscles: Normal  Neurological  No nystagmus or asterixis. Skin  Skin and subcutaneous tissue: Normal without rashes or lesions. Lymphatic  Palpation of the lymph nodes in neck: No lymphadenopathy.    Psychiatric  Orientation to person, place and time: Normal.  Mood and affect: Normal.         Labs:  Lab Results   Component Value Date    ALT 40 06/11/2023    AST 26 06/11/2023    BUN 12 07/13/2023    CALCIUM 9.7 07/13/2023     07/13/2023    CHOL 216 (H) 03/22/2016    CO2 28 07/13/2023    CREATININE 0.68 07/13/2023    HDL 69 08/02/2023    HCT 42.3 06/11/2023    HGB 13.7 06/11/2023    HGBA1C 5.5 06/17/2022     06/11/2023    K 4.0 07/13/2023     03/22/2016    TRIG 74 08/02/2023    WBC 4.02 (L) 06/11/2023         X-Rays & Procedures:   US abdomen limited    (Results Pending)           ______________________________________________________________________      Assessment & Plan:     Diagnoses and all orders for this visit:    Left lower quadrant abdominal pain  -     Occult Blood, Fecal Immunochemical; Future    Bloating  -     US abdomen limited; Future  -     EGD; Future    Weight loss  -     US abdomen limited; Future  -     EGD; Future    Epigastric pain  -     US abdomen limited; Future  -     EGD; Future    Right lower quadrant abdominal pain  -     Occult Blood, Fecal Immunochemical; Future    Nausea  -     US abdomen limited; Future  -     EGD; Future      Patient will undergo EGD. We will obtain a fit test and an ultrasound. Further recommendations will depend on study results. I do feel much of this might be stress related but studies will tell.

## 2023-09-15 ENCOUNTER — APPOINTMENT (OUTPATIENT)
Dept: LAB | Facility: HOSPITAL | Age: 63
End: 2023-09-15

## 2023-09-18 ENCOUNTER — APPOINTMENT (OUTPATIENT)
Dept: LAB | Facility: HOSPITAL | Age: 63
End: 2023-09-18
Payer: COMMERCIAL

## 2023-09-18 DIAGNOSIS — R10.31 RIGHT LOWER QUADRANT ABDOMINAL PAIN: ICD-10-CM

## 2023-09-18 DIAGNOSIS — R10.32 LEFT LOWER QUADRANT ABDOMINAL PAIN: ICD-10-CM

## 2023-09-18 LAB — HEMOCCULT STL QL IA: POSITIVE

## 2023-09-18 PROCEDURE — G0328 FECAL BLOOD SCRN IMMUNOASSAY: HCPCS

## 2023-09-19 ENCOUNTER — HOSPITAL ENCOUNTER (EMERGENCY)
Facility: HOSPITAL | Age: 63
Discharge: HOME | End: 2023-09-19
Attending: EMERGENCY MEDICINE
Payer: COMMERCIAL

## 2023-09-19 ENCOUNTER — APPOINTMENT (EMERGENCY)
Dept: RADIOLOGY | Facility: HOSPITAL | Age: 63
End: 2023-09-19
Payer: COMMERCIAL

## 2023-09-19 ENCOUNTER — PREP FOR PROCEDURE (OUTPATIENT)
Age: 63
End: 2023-09-19

## 2023-09-19 ENCOUNTER — TELEPHONE (OUTPATIENT)
Age: 63
End: 2023-09-19

## 2023-09-19 VITALS
SYSTOLIC BLOOD PRESSURE: 165 MMHG | DIASTOLIC BLOOD PRESSURE: 89 MMHG | RESPIRATION RATE: 20 BRPM | OXYGEN SATURATION: 98 % | TEMPERATURE: 98.4 F | HEART RATE: 92 BPM

## 2023-09-19 DIAGNOSIS — R19.5 POSITIVE FIT (FECAL IMMUNOCHEMICAL TEST): Primary | ICD-10-CM

## 2023-09-19 DIAGNOSIS — R10.9 ABDOMINAL PAIN: ICD-10-CM

## 2023-09-19 DIAGNOSIS — K92.2 GI BLEED: Primary | ICD-10-CM

## 2023-09-19 DIAGNOSIS — K92.1 MELENA: ICD-10-CM

## 2023-09-19 LAB
ABO GROUP BLD: NORMAL
ALBUMIN SERPL BCP-MCNC: 4.5 G/DL (ref 3.5–5)
ALP SERPL-CCNC: 85 U/L (ref 34–104)
ALT SERPL W P-5'-P-CCNC: 36 U/L (ref 7–52)
ANION GAP SERPL CALCULATED.3IONS-SCNC: 10 MMOL/L
AST SERPL W P-5'-P-CCNC: 26 U/L (ref 13–39)
ATRIAL RATE: 74 BPM
BASOPHILS # BLD AUTO: 0.03 THOUSANDS/ÂΜL (ref 0–0.1)
BASOPHILS NFR BLD AUTO: 1 % (ref 0–1)
BILIRUB SERPL-MCNC: 0.73 MG/DL (ref 0.2–1)
BILIRUB UR QL STRIP: NEGATIVE
BLD GP AB SCN SERPL QL: NEGATIVE
BUN SERPL-MCNC: 8 MG/DL (ref 5–25)
CALCIUM SERPL-MCNC: 9.2 MG/DL (ref 8.4–10.2)
CARDIAC TROPONIN I PNL SERPL HS: 3 NG/L
CHLORIDE SERPL-SCNC: 107 MMOL/L (ref 96–108)
CLARITY UR: CLEAR
CO2 SERPL-SCNC: 27 MMOL/L (ref 21–32)
COLOR UR: COLORLESS
CREAT SERPL-MCNC: 0.57 MG/DL (ref 0.6–1.3)
EOSINOPHIL # BLD AUTO: 0 THOUSAND/ÂΜL (ref 0–0.61)
EOSINOPHIL NFR BLD AUTO: 0 % (ref 0–6)
ERYTHROCYTE [DISTWIDTH] IN BLOOD BY AUTOMATED COUNT: 13.2 % (ref 11.6–15.1)
GFR SERPL CREATININE-BSD FRML MDRD: 98 ML/MIN/1.73SQ M
GLUCOSE SERPL-MCNC: 101 MG/DL (ref 65–140)
GLUCOSE UR STRIP-MCNC: NEGATIVE MG/DL
HCT VFR BLD AUTO: 43.9 % (ref 34.8–46.1)
HGB BLD-MCNC: 14.3 G/DL (ref 11.5–15.4)
HGB UR QL STRIP.AUTO: NEGATIVE
IMM GRANULOCYTES # BLD AUTO: 0.02 THOUSAND/UL (ref 0–0.2)
IMM GRANULOCYTES NFR BLD AUTO: 1 % (ref 0–2)
KETONES UR STRIP-MCNC: NEGATIVE MG/DL
LACTATE SERPL-SCNC: 0.9 MMOL/L (ref 0.5–2)
LEUKOCYTE ESTERASE UR QL STRIP: NEGATIVE
LIPASE SERPL-CCNC: 10 U/L (ref 11–82)
LYMPHOCYTES # BLD AUTO: 0.97 THOUSANDS/ÂΜL (ref 0.6–4.47)
LYMPHOCYTES NFR BLD AUTO: 22 % (ref 14–44)
MCH RBC QN AUTO: 30 PG (ref 26.8–34.3)
MCHC RBC AUTO-ENTMCNC: 32.6 G/DL (ref 31.4–37.4)
MCV RBC AUTO: 92 FL (ref 82–98)
MONOCYTES # BLD AUTO: 0.18 THOUSAND/ÂΜL (ref 0.17–1.22)
MONOCYTES NFR BLD AUTO: 4 % (ref 4–12)
NEUTROPHILS # BLD AUTO: 3.21 THOUSANDS/ÂΜL (ref 1.85–7.62)
NEUTS SEG NFR BLD AUTO: 72 % (ref 43–75)
NITRITE UR QL STRIP: NEGATIVE
NRBC BLD AUTO-RTO: 0 /100 WBCS
P AXIS: 76 DEGREES
PH UR STRIP.AUTO: 6.5 [PH]
PLATELET # BLD AUTO: 237 THOUSANDS/UL (ref 149–390)
PMV BLD AUTO: 9 FL (ref 8.9–12.7)
POTASSIUM SERPL-SCNC: 3.5 MMOL/L (ref 3.5–5.3)
PR INTERVAL: 146 MS
PROT SERPL-MCNC: 6.8 G/DL (ref 6.4–8.4)
PROT UR STRIP-MCNC: NEGATIVE MG/DL
QRS AXIS: 65 DEGREES
QRSD INTERVAL: 92 MS
QT INTERVAL: 482 MS
QTC INTERVAL: 535 MS
RBC # BLD AUTO: 4.77 MILLION/UL (ref 3.81–5.12)
RH BLD: POSITIVE
SODIUM SERPL-SCNC: 144 MMOL/L (ref 135–147)
SP GR UR STRIP.AUTO: 1.01 (ref 1–1.03)
SPECIMEN EXPIRATION DATE: NORMAL
T WAVE AXIS: 57 DEGREES
UROBILINOGEN UR STRIP-ACNC: <2 MG/DL
VENTRICULAR RATE: 74 BPM
WBC # BLD AUTO: 4.41 THOUSAND/UL (ref 4.31–10.16)

## 2023-09-19 PROCEDURE — 96375 TX/PRO/DX INJ NEW DRUG ADDON: CPT

## 2023-09-19 PROCEDURE — 36415 COLL VENOUS BLD VENIPUNCTURE: CPT

## 2023-09-19 PROCEDURE — 74177 CT ABD & PELVIS W/CONTRAST: CPT

## 2023-09-19 PROCEDURE — 83605 ASSAY OF LACTIC ACID: CPT

## 2023-09-19 PROCEDURE — 93005 ELECTROCARDIOGRAM TRACING: CPT

## 2023-09-19 PROCEDURE — 93010 ELECTROCARDIOGRAM REPORT: CPT | Performed by: INTERNAL MEDICINE

## 2023-09-19 PROCEDURE — 86900 BLOOD TYPING SEROLOGIC ABO: CPT

## 2023-09-19 PROCEDURE — G1004 CDSM NDSC: HCPCS

## 2023-09-19 PROCEDURE — 86901 BLOOD TYPING SEROLOGIC RH(D): CPT

## 2023-09-19 PROCEDURE — 96365 THER/PROPH/DIAG IV INF INIT: CPT

## 2023-09-19 PROCEDURE — 84484 ASSAY OF TROPONIN QUANT: CPT

## 2023-09-19 PROCEDURE — 80053 COMPREHEN METABOLIC PANEL: CPT

## 2023-09-19 PROCEDURE — 83690 ASSAY OF LIPASE: CPT

## 2023-09-19 PROCEDURE — 81003 URINALYSIS AUTO W/O SCOPE: CPT

## 2023-09-19 PROCEDURE — 85025 COMPLETE CBC W/AUTO DIFF WBC: CPT

## 2023-09-19 PROCEDURE — C9113 INJ PANTOPRAZOLE SODIUM, VIA: HCPCS | Performed by: EMERGENCY MEDICINE

## 2023-09-19 PROCEDURE — 99285 EMERGENCY DEPT VISIT HI MDM: CPT | Performed by: EMERGENCY MEDICINE

## 2023-09-19 PROCEDURE — 99284 EMERGENCY DEPT VISIT MOD MDM: CPT

## 2023-09-19 PROCEDURE — 86850 RBC ANTIBODY SCREEN: CPT

## 2023-09-19 RX ORDER — FAMOTIDINE 20 MG/1
20 TABLET, FILM COATED ORAL 2 TIMES DAILY PRN
Qty: 60 TABLET | Refills: 0 | Status: SHIPPED | OUTPATIENT
Start: 2023-09-19 | End: 2023-09-26 | Stop reason: SDUPTHER

## 2023-09-19 RX ORDER — PANTOPRAZOLE SODIUM 20 MG/1
20 TABLET, DELAYED RELEASE ORAL DAILY
Qty: 31 TABLET | Refills: 0 | Status: SHIPPED | OUTPATIENT
Start: 2023-09-19 | End: 2023-09-26 | Stop reason: SDUPTHER

## 2023-09-19 RX ORDER — ONDANSETRON 2 MG/ML
4 INJECTION INTRAMUSCULAR; INTRAVENOUS ONCE
Status: DISCONTINUED | OUTPATIENT
Start: 2023-09-19 | End: 2023-09-19 | Stop reason: HOSPADM

## 2023-09-19 RX ORDER — MORPHINE SULFATE 4 MG/ML
4 INJECTION, SOLUTION INTRAMUSCULAR; INTRAVENOUS ONCE
Status: COMPLETED | OUTPATIENT
Start: 2023-09-19 | End: 2023-09-19

## 2023-09-19 RX ADMIN — IOHEXOL 100 ML: 350 INJECTION, SOLUTION INTRAVENOUS at 18:02

## 2023-09-19 RX ADMIN — MORPHINE SULFATE 4 MG: 4 INJECTION INTRAVENOUS at 16:43

## 2023-09-19 RX ADMIN — SODIUM CHLORIDE 80 MG: 9 INJECTION, SOLUTION INTRAVENOUS at 17:34

## 2023-09-19 NOTE — ED PROVIDER NOTES
Final Diagnoses:     1. GI bleed    2. Melena    3. Abdominal pain           Nursing Triage:     Chief Complaint   Patient presents with   • Abdominal Pain     Pt reports lower abd pain that has been going on for the past 2 weeks. +nausea   • Cough     Pt also reports a cough for the past couple of months     HPI:   This is a 57 y.o. female presenting for evaluation of abdominal pain and melena. Relevant past medical history of anemia, breast cancer, murmur, hyperlipidemia, aortic valve disease. .  Patient states that she has been having melena for "some time now". They are scheduled to possibly get a colonoscopy next month. She states that she is coming in today because the last 2 weeks she has been having increasing bilateral lower abdominal pain that is now associated with nausea today. She states that she has been very stressed due to her  being in the hospital the  also endorses that he thinks that she may have an ulcer ( is a doctor). She states that the melena has not increased or change in volume or characteristics. She states that abdominal pain is constant and made worse when palpated. Patient denies any increasing fatigue, headache, chest pain, shortness of breath, diarrhea, vomiting. ASSESSMENT + PLAN:   Given the persistent melena in addition to this abdominal pain will obtain CT abdomen pelvis. We will also obtain basic labs look for any signs of anemia. We will also obtain lactic acid in case of any mesenteric ischemia. We will also get troponins. Physical:   Pertinent: No conjunctival pallor, dry mucous membranes. Tenderness to lower and right upper quadrant. General: VS reviewed,   Appears in NAD  awake, alert. Well-nourished, well-developed. Appears stated age. Speaking normally in full sentences. Head: Normocephalic, atraumatic  Eyes: EOM-I. No subconjunctival hemorrhages. Symmetrical lids. ENT: Atraumatic external nose and ears.     No stridor. Normal phonation. No drooling. Normal swallowing. No erythema or exudates in mouth or posterior pharynx  Neck: No JVD. CV: Regular rate and rhythm, no murmurs rubs or gallops, no pallor noted  Lungs: No respiratory distress  Clear to ausculation bilaterally. No tachypnea  Abd: soft nd no rebound/guarding  MSK: Moves all extremities spontaneously  Skin: Dry, intact. Neuro: Awake, alert, GCS15, CN II-XII grossly intact. Psychiatric/Behavioral: interacting normally; appropriate mood/affect. Vitals:    09/19/23 1414   BP: 165/89   BP Location: Right arm   Pulse: 92   Resp: 20   Temp: 98.4 °F (36.9 °C)   TempSrc: Temporal   SpO2: 98%     - There are no obvious limitations to social determinants of care. - Nursing note reviewed. - Vitals reviewed. - Orders placed by myself and/or advanced practitioner / resident.    - Previous chart was reviewed  - No language barrier.   - History obtained from  and patient. - There are no limitations to the history obtained:     Past Medical:    has a past medical history of Anemia, Aortic valve disease, Breast cancer ([address]), Cardiac murmur, and Hyperlipidemia. Past Surgical:    has a past surgical history that includes Breast surgery; pr esophagogastroduodenoscopy transoral diagnostic (N/A, 2/19/2019); and Colonoscopy. Social:     Social History     Substance and Sexual Activity   Alcohol Use Not Currently    Comment: social     Social History     Tobacco Use   Smoking Status Never   Smokeless Tobacco Never     Social History     Substance and Sexual Activity   Drug Use No       Echo:   No results found for this or any previous visit. No results found for this or any previous visit. Cath:    No results found for this or any previous visit.       Code Status: No Order  Advance Directive and Living Will:      Power of :    POLST:    Medications   ondansetron (ZOFRAN) injection 4 mg (4 mg Intravenous Not Given 9/19/23 3441) morphine injection 4 mg (4 mg Intravenous Given 9/19/23 1643)   pantoprazole (PROTONIX) 80 mg in sodium chloride 0.9 % 100 mL IVPB (0 mg Intravenous Stopped 9/19/23 1850)   iohexol (OMNIPAQUE) 350 MG/ML injection (MULTI-DOSE) 100 mL (100 mL Intravenous Given 9/19/23 1802)     CT abdomen pelvis with contrast   Final Result      1) No acute abdominal or pelvic pathology. 2) Normal appendix. Evaluation for bowel inflammation also somewhat limited by underdistention and lack of oral contrast, however no convincing inflammatory changes. Please note mild inflammation may not be apparent. 3) No bowel obstruction. 4) Additional findings as above. Workstation performed: [id number]           Orders Placed This Encounter   Procedures   • CT abdomen pelvis with contrast   • CBC and differential   • CMP   • Lipase   • UA w Reflex to Microscopic w Reflex to Culture   • Lactic acid, plasma (w/reflex if result > 2.0)   • HS Troponin 0hr (reflex protocol)   • HS Troponin I 2hr   • HS Troponin I 4hr   • Ambulatory Referral to Gastroenterology   • Insert peripheral IV   • Insert peripheral IV   • ECG 12 lead   • ECG 12 lead   • Type and screen   • ABORh Recheck - Contact Blood Bank Prior to Collection     Labs Reviewed   COMPREHENSIVE METABOLIC PANEL - Abnormal       Result Value Ref Range Status    Sodium 144  135 - 147 mmol/L Final    Potassium 3.5  3.5 - 5.3 mmol/L Final    Chloride 107  96 - 108 mmol/L Final    CO2 27  21 - 32 mmol/L Final    ANION GAP 10  mmol/L Final    BUN 8  5 - 25 mg/dL Final    Creatinine 0.57 (*) 0.60 - 1.30 mg/dL Final    Comment: Standardized to IDMS reference method    Glucose 101  65 - 140 mg/dL Final    Comment: If the patient is fasting, the ADA then defines impaired fasting glucose as > 100 mg/dL and diabetes as > or equal to 123 mg/dL.     Calcium 9.2  8.4 - 10.2 mg/dL Final    AST 26  13 - 39 U/L Final    ALT 36  7 - 52 U/L Final    Comment: Specimen collection should occur prior to Sulfasalazine administration due to the potential for falsely depressed results. Alkaline Phosphatase 85  34 - 104 U/L Final    Total Protein 6.8  6.4 - 8.4 g/dL Final    Albumin 4.5  3.5 - 5.0 g/dL Final    Total Bilirubin 0.73  0.20 - 1.00 mg/dL Final    Comment: Use of this assay is not recommended for patients undergoing treatment with eltrombopag due to the potential for falsely elevated results. N-acetyl-p-benzoquinone imine (metabolite of Acetaminophen) will generate erroneously low results in samples for patients that have taken an overdose of Acetaminophen. eGFR 98  ml/min/1.73sq m Final    Narrative:     [place] guidelines for Chronic Kidney Disease (CKD):   •  Stage 1 with normal or high GFR (GFR > 90 mL/min/1.73 square meters)  •  Stage 2 Mild CKD (GFR = 60-89 mL/min/1.73 square meters)  •  Stage 3A Moderate CKD (GFR = 45-59 mL/min/1.73 square meters)  •  Stage 3B Moderate CKD (GFR = 30-44 mL/min/1.73 square meters)  •  Stage 4 Severe CKD (GFR = 15-29 mL/min/1.73 square meters)  •  Stage 5 End Stage CKD (GFR <15 mL/min/1.73 square meters)  Note: GFR calculation is accurate only with a steady state creatinine   LIPASE - Abnormal    Lipase 10 (*) 11 - 82 u/L Final   LACTIC ACID, PLASMA (W/REFLEX IF RESULT > 2.0) - Normal    LACTIC ACID 0.9  0.5 - 2.0 mmol/L Final    Narrative:     Result may be elevated if tourniquet was used during collection. HS TROPONIN I 0HR - Normal    hs TnI 0hr 3  "Refer to ACS Flowchart"- see link ng/L Final    Comment:                                              Initial (time 0) result  If >=50 ng/L, Myocardial injury suggested ;  Type of myocardial injury and treatment strategy  to be determined. If 5-49 ng/L, a delta result at 2 hours and or 4 hours will be needed to further evaluate. If <4 ng/L, and chest pain has been >3 hours since onset, patient may qualify for discharge based on the HEART score in the ED.   If <5 ng/L and <3hours since onset of chest pain, a delta result at 2 hours will be needed to further evaluate. HS Troponin 99th Percentile URL of a Health Population=12 ng/L with a 95% Confidence Interval of 8-18 ng/L. Second Troponin (time 2 hours)  If calculated delta >= 20 ng/L,  Myocardial injury suggested ; Type of myocardial injury and treatment strategy to be determined. If 5-49 ng/L and the calculated delta is 5-19 ng/L, consult medical service for evaluation. Continue evaluation for ischemia on ecg and other possible etiology and repeat hs troponin at 4 hours. If delta is <5 ng/L at 2 hours, consider discharge based on risk stratification via the HEART score (if in ED), or JOAQUÍN risk score in IP/Observation.     HS Troponin 99th Percentile URL of a Health Population=12 ng/L with a 95% Confidence Interval of 8-18 ng/L.   CBC AND DIFFERENTIAL    WBC 4.41  4.31 - 10.16 Thousand/uL Final    RBC 4.77  3.81 - 5.12 Million/uL Final    Hemoglobin 14.3  11.5 - 15.4 g/dL Final    Hematocrit 43.9  34.8 - 46.1 % Final    MCV 92  82 - 98 fL Final    MCH 30.0  26.8 - 34.3 pg Final    MCHC 32.6  31.4 - 37.4 g/dL Final    RDW 13.2  11.6 - 15.1 % Final    MPV 9.0  8.9 - 12.7 fL Final    Platelets 311  332 - 390 Thousands/uL Final    nRBC 0  /100 WBCs Final    Neutrophils Relative 72  43 - 75 % Final    Immat GRANS % 1  0 - 2 % Final    Lymphocytes Relative 22  14 - 44 % Final    Monocytes Relative 4  4 - 12 % Final    Eosinophils Relative 0  0 - 6 % Final    Basophils Relative 1  0 - 1 % Final    Neutrophils Absolute 3.21  1.85 - 7.62 Thousands/µL Final    Immature Grans Absolute 0.02  0.00 - 0.20 Thousand/uL Final    Lymphocytes Absolute 0.97  0.60 - 4.47 Thousands/µL Final    Monocytes Absolute 0.18  0.17 - 1.22 Thousand/µL Final    Eosinophils Absolute 0.00  0.00 - 0.61 Thousand/µL Final    Basophils Absolute 0.03  0.00 - 0.10 Thousands/µL Final   UA W REFLEX TO MICROSCOPIC WITH REFLEX TO CULTURE    Color, UA Colorless Final    Clarity, UA Clear   Final    Specific Gravity, UA 1.008  1.003 - 1.030 Final    pH, UA 6.5  4.5, 5.0, 5.5, 6.0, 6.5, 7.0, 7.5, 8.0 Final    Leukocytes, UA Negative  Negative Final    Nitrite, UA Negative  Negative Final    Protein, UA Negative  Negative mg/dl Final    Glucose, UA Negative  Negative mg/dl Final    Ketones, UA Negative  Negative mg/dl Final    Urobilinogen, UA <2.0  <2.0 mg/dl mg/dl Final    Bilirubin, UA Negative  Negative Final    Occult Blood, UA Negative  Negative Final   HS TROPONIN I 2HR   HS TROPONIN I 4HR   TYPE AND SCREEN    ABO Grouping A   Final    Rh Factor Positive   Final    Antibody Screen Negative   Final    Specimen Expiration Date [id number]   Final   ABORH RECHECK     Time reflects when diagnosis was documented in both MDM as applicable and the Disposition within this note     Time User Action Codes Description Comment    9/19/2023  6:37 PM Andrew Gondola Add [K92.2] GI bleed     9/19/2023  6:37 PM Andrew Gondola Add [K92.1] Melena     9/19/2023  6:37 PM Andrew Gondola Add [R10.9] Abdominal pain       ED Disposition     ED Disposition   Discharge    Condition   Stable    Date/Time   Tue Sep 19, 2023  6:37 PM    Comment   Cristine Fallon discharge to home/self care. Follow-up Information    None       Patient's Medications   Discharge Prescriptions    FAMOTIDINE (PEPCID) 20 MG TABLET    Take 1 tablet (20 mg total) by mouth 2 (two) times a day as needed for heartburn or indigestion       Start Date: 9/19/2023 End Date: --       Order Dose: 20 mg       Quantity: 60 tablet    Refills: 0    PANTOPRAZOLE (PROTONIX) 20 MG TABLET    Take 1 tablet (20 mg total) by mouth daily       Start Date: 9/19/2023 End Date: 10/20/2023       Order Dose: 20 mg       Quantity: 31 tablet    Refills: 0       Prior to Admission Medications   Prescriptions Last Dose Informant Patient Reported? Taking?    Cholecalciferol (VITAMIN D3) 2000 units capsule  Self Yes No   Sig: Take 1 capsule by mouth daily   Omega-3 Fatty Acids (FISH OIL) 1,000 mg  Self Yes No   Sig: Take 1,000 mg by mouth daily   aspirin (Aspirin 81) 81 mg EC tablet  Self No No   Sig: Take 1 tablet (81 mg total) by mouth daily   atorvastatin (LIPITOR) 40 mg tablet  Self No No   Sig: TAKE 1 TABLET BY MOUTH EVERY DAY   losartan-hydrochlorothiazide (HYZAAR) 50-12.5 mg per tablet  Self No No   Sig: TAKE 1 TABLET BY MOUTH EVERY DAY   methylPREDNISolone 4 MG tablet therapy pack  Self No No   Sig: Use as directed on package   Patient not taking: Reported on 8/1/2023   ondansetron (ZOFRAN) 4 mg tablet  Self No No   Sig: Take 1 tablet (4 mg total) by mouth every 6 (six) hours as needed for nausea or vomiting      Facility-Administered Medications: None                        Portions of the record may have been created with voice recognition software. Occasional wrong word or "sound a like" substitutions may have occurred due to the inherent limitations of voice recognition software. Read the chart carefully and recognize, using context, where substitutions have occurred.        Rosa Isela Brumfield MD  09/19/23 9666

## 2023-09-19 NOTE — TELEPHONE ENCOUNTER
----- Message from Delta Hill MD sent at 9/18/2023  4:53 PM EDT -----  Please tell patient the stool test was positive and we will add colonoscopy to egd - please schedule this

## 2023-09-21 ENCOUNTER — NURSE TRIAGE (OUTPATIENT)
Age: 63
End: 2023-09-21

## 2023-09-21 ENCOUNTER — HOSPITAL ENCOUNTER (OUTPATIENT)
Dept: ULTRASOUND IMAGING | Facility: HOSPITAL | Age: 63
End: 2023-09-21
Attending: INTERNAL MEDICINE
Payer: COMMERCIAL

## 2023-09-21 DIAGNOSIS — R11.0 NAUSEA: ICD-10-CM

## 2023-09-21 DIAGNOSIS — R63.4 WEIGHT LOSS: ICD-10-CM

## 2023-09-21 DIAGNOSIS — R14.0 BLOATING: ICD-10-CM

## 2023-09-21 DIAGNOSIS — R10.13 EPIGASTRIC PAIN: ICD-10-CM

## 2023-09-21 PROCEDURE — 76705 ECHO EXAM OF ABDOMEN: CPT

## 2023-09-21 NOTE — ED ATTENDING ATTESTATION
9/19/2023  I, Noah Padilla MD, saw and evaluated the patient. I have discussed the patient with the resident/non-physician practitioner and agree with the resident's/non-physician practitioner's findings, Plan of Care, and MDM as documented in the resident's/non-physician practitioner's note, except where noted. All available labs and Radiology studies were reviewed. I was present for key portions of any procedure(s) performed by the resident/non-physician practitioner and I was immediately available to provide assistance. At this point I agree with the current assessment done in the Emergency Department. I have conducted an independent evaluation of this patient a history and physical is as follows:    ED Course      Impression: Abdominal pain, nausea, melena stools   DDx: PUD Gastritis Gasteroenteritsi colitis diverticulitis pancreatitis Upper GI bleed     Will check CBC cmp lipase UA Lactic Acid ECG Troponin     Treat pain with IV morphine Nausea with IV Zofran and Protonix    ECG independenlty intrepreted by me: Normal sinus rhythm normal rate normal axis normal intervals non specific T wave abnormality     CT abdomen and Pelvis reviewed report: No acute abdominal or pelvic pathology.    2) Normal appendix. Evaluation for bowel inflammation also somewhat limited by underdistention and lack of oral contrast, however no convincing inflammatory changes. Please note mild inflammation may not be apparent.    3) No bowel obstruction.    4) Additional findings as above.     Labs reviewed: UA wnl, CBC wnl, CMP wnl, Lipase wnl, lactic acid wnl, Troponin 3     Patient reassessed  Pain improved will dc to home oral protonix ambulatory referral to GI placed     Return precautions given       Critical Care Time  Procedures

## 2023-09-21 NOTE — TELEPHONE ENCOUNTER
Patient calling in for US results, I advised they are still pending.  She would like to know if there are any sooner appts for colonoscopy/ EGD please advise

## 2023-09-22 ENCOUNTER — NURSE TRIAGE (OUTPATIENT)
Age: 63
End: 2023-09-22

## 2023-09-22 NOTE — TELEPHONE ENCOUNTER
SPOKE WITH PT, REQUESTING U/S RESULTS, PT INFORMED RESULTS ARE NOT YET FINAL. PT WOULD LIKE TO SCHEDULE EGD, STATES SHE WAS RECENTLY IN ER, WAS ADVISED TO HAVE EGD ASAP. THANK YOU.

## 2023-09-24 ENCOUNTER — HOSPITAL ENCOUNTER (OUTPATIENT)
Facility: HOSPITAL | Age: 63
Setting detail: OBSERVATION
Discharge: HOME | End: 2023-09-25
Attending: EMERGENCY MEDICINE | Admitting: STUDENT IN AN ORGANIZED HEALTH CARE EDUCATION/TRAINING PROGRAM
Payer: COMMERCIAL

## 2023-09-24 ENCOUNTER — APPOINTMENT (EMERGENCY)
Dept: RADIOLOGY | Facility: HOSPITAL | Age: 63
End: 2023-09-24
Payer: COMMERCIAL

## 2023-09-24 DIAGNOSIS — K92.1 MELENA: Primary | ICD-10-CM

## 2023-09-24 DIAGNOSIS — R10.13 EPIGASTRIC PAIN: ICD-10-CM

## 2023-09-24 DIAGNOSIS — K21.00 REFLUX ESOPHAGITIS: ICD-10-CM

## 2023-09-24 PROBLEM — E87.6 HYPOKALEMIA: Status: ACTIVE | Noted: 2023-09-24

## 2023-09-24 PROBLEM — R07.9 CHEST PAIN: Status: RESOLVED | Noted: 2023-09-24 | Resolved: 2023-09-24

## 2023-09-24 PROBLEM — R10.9 ABDOMINAL PAIN: Status: ACTIVE | Noted: 2023-09-24

## 2023-09-24 PROBLEM — R07.9 CHEST PAIN: Status: ACTIVE | Noted: 2023-09-24

## 2023-09-24 LAB
2HR DELTA HS TROPONIN: 1 NG/L
4HR DELTA HS TROPONIN: 2 NG/L
ALBUMIN SERPL BCP-MCNC: 3.8 G/DL (ref 3.5–5)
ALP SERPL-CCNC: 71 U/L (ref 34–104)
ALT SERPL W P-5'-P-CCNC: 23 U/L (ref 7–52)
ANION GAP SERPL CALCULATED.3IONS-SCNC: 6 MMOL/L
APTT PPP: 23 SECONDS (ref 23–37)
AST SERPL W P-5'-P-CCNC: 17 U/L (ref 13–39)
BASOPHILS # BLD AUTO: 0.02 THOUSANDS/ÂΜL (ref 0–0.1)
BASOPHILS NFR BLD AUTO: 0 % (ref 0–1)
BILIRUB SERPL-MCNC: 0.81 MG/DL (ref 0.2–1)
BUN SERPL-MCNC: 5 MG/DL (ref 5–25)
CALCIUM SERPL-MCNC: 8.4 MG/DL (ref 8.4–10.2)
CARDIAC TROPONIN I PNL SERPL HS: 2 NG/L
CARDIAC TROPONIN I PNL SERPL HS: 3 NG/L
CARDIAC TROPONIN I PNL SERPL HS: 4 NG/L
CHLORIDE SERPL-SCNC: 111 MMOL/L (ref 96–108)
CO2 SERPL-SCNC: 28 MMOL/L (ref 21–32)
CREAT SERPL-MCNC: 0.6 MG/DL (ref 0.6–1.3)
EOSINOPHIL # BLD AUTO: 0.02 THOUSAND/ÂΜL (ref 0–0.61)
EOSINOPHIL NFR BLD AUTO: 0 % (ref 0–6)
ERYTHROCYTE [DISTWIDTH] IN BLOOD BY AUTOMATED COUNT: 12.9 % (ref 11.6–15.1)
FLUAV RNA RESP QL NAA+PROBE: NEGATIVE
FLUBV RNA RESP QL NAA+PROBE: NEGATIVE
GFR SERPL CREATININE-BSD FRML MDRD: 96 ML/MIN/1.73SQ M
GLUCOSE SERPL-MCNC: 98 MG/DL (ref 65–140)
HCT VFR BLD AUTO: 42.9 % (ref 34.8–46.1)
HGB BLD-MCNC: 14 G/DL (ref 11.5–15.4)
IMM GRANULOCYTES # BLD AUTO: 0.01 THOUSAND/UL (ref 0–0.2)
IMM GRANULOCYTES NFR BLD AUTO: 0 % (ref 0–2)
INR PPP: 0.9 (ref 0.84–1.19)
LIPASE SERPL-CCNC: 16 U/L (ref 11–82)
LYMPHOCYTES # BLD AUTO: 1.28 THOUSANDS/ÂΜL (ref 0.6–4.47)
LYMPHOCYTES NFR BLD AUTO: 27 % (ref 14–44)
MCH RBC QN AUTO: 30.2 PG (ref 26.8–34.3)
MCHC RBC AUTO-ENTMCNC: 32.6 G/DL (ref 31.4–37.4)
MCV RBC AUTO: 93 FL (ref 82–98)
MONOCYTES # BLD AUTO: 0.2 THOUSAND/ÂΜL (ref 0.17–1.22)
MONOCYTES NFR BLD AUTO: 4 % (ref 4–12)
NEUTROPHILS # BLD AUTO: 3.24 THOUSANDS/ÂΜL (ref 1.85–7.62)
NEUTS SEG NFR BLD AUTO: 69 % (ref 43–75)
NRBC BLD AUTO-RTO: 0 /100 WBCS
PLATELET # BLD AUTO: 271 THOUSANDS/UL (ref 149–390)
PMV BLD AUTO: 9.9 FL (ref 8.9–12.7)
POTASSIUM SERPL-SCNC: 3.4 MMOL/L (ref 3.5–5.3)
PROT SERPL-MCNC: 5.9 G/DL (ref 6.4–8.4)
PROTHROMBIN TIME: 12.8 SECONDS (ref 11.6–14.5)
RBC # BLD AUTO: 4.63 MILLION/UL (ref 3.81–5.12)
RSV RNA RESP QL NAA+PROBE: NEGATIVE
S PYO DNA THROAT QL NAA+PROBE: NOT DETECTED
SARS-COV-2 RNA RESP QL NAA+PROBE: NEGATIVE
SODIUM SERPL-SCNC: 145 MMOL/L (ref 135–147)
WBC # BLD AUTO: 4.77 THOUSAND/UL (ref 4.31–10.16)

## 2023-09-24 PROCEDURE — 71046 X-RAY EXAM CHEST 2 VIEWS: CPT

## 2023-09-24 PROCEDURE — 84484 ASSAY OF TROPONIN QUANT: CPT | Performed by: EMERGENCY MEDICINE

## 2023-09-24 PROCEDURE — 96374 THER/PROPH/DIAG INJ IV PUSH: CPT

## 2023-09-24 PROCEDURE — 93005 ELECTROCARDIOGRAM TRACING: CPT

## 2023-09-24 PROCEDURE — 85730 THROMBOPLASTIN TIME PARTIAL: CPT | Performed by: EMERGENCY MEDICINE

## 2023-09-24 PROCEDURE — 36415 COLL VENOUS BLD VENIPUNCTURE: CPT | Performed by: EMERGENCY MEDICINE

## 2023-09-24 PROCEDURE — 83690 ASSAY OF LIPASE: CPT | Performed by: EMERGENCY MEDICINE

## 2023-09-24 PROCEDURE — 85610 PROTHROMBIN TIME: CPT | Performed by: EMERGENCY MEDICINE

## 2023-09-24 PROCEDURE — 87651 STREP A DNA AMP PROBE: CPT | Performed by: EMERGENCY MEDICINE

## 2023-09-24 PROCEDURE — 85025 COMPLETE CBC W/AUTO DIFF WBC: CPT | Performed by: EMERGENCY MEDICINE

## 2023-09-24 PROCEDURE — 99285 EMERGENCY DEPT VISIT HI MDM: CPT | Performed by: EMERGENCY MEDICINE

## 2023-09-24 PROCEDURE — 99223 1ST HOSP IP/OBS HIGH 75: CPT | Performed by: STUDENT IN AN ORGANIZED HEALTH CARE EDUCATION/TRAINING PROGRAM

## 2023-09-24 PROCEDURE — C9113 INJ PANTOPRAZOLE SODIUM, VIA: HCPCS | Performed by: EMERGENCY MEDICINE

## 2023-09-24 PROCEDURE — 0241U HB NFCT DS VIR RESP RNA 4 TRGT: CPT | Performed by: EMERGENCY MEDICINE

## 2023-09-24 PROCEDURE — 99285 EMERGENCY DEPT VISIT HI MDM: CPT

## 2023-09-24 PROCEDURE — 80053 COMPREHEN METABOLIC PANEL: CPT | Performed by: EMERGENCY MEDICINE

## 2023-09-24 RX ORDER — PANTOPRAZOLE SODIUM 40 MG/10ML
40 INJECTION, POWDER, LYOPHILIZED, FOR SOLUTION INTRAVENOUS
Status: DISCONTINUED | OUTPATIENT
Start: 2023-09-25 | End: 2023-09-25 | Stop reason: HOSPADM

## 2023-09-24 RX ORDER — SUCRALFATE 1 G/1
1 TABLET ORAL
Status: DISCONTINUED | OUTPATIENT
Start: 2023-09-25 | End: 2023-09-25 | Stop reason: HOSPADM

## 2023-09-24 RX ORDER — ATORVASTATIN CALCIUM 40 MG/1
40 TABLET, FILM COATED ORAL DAILY
Status: DISCONTINUED | OUTPATIENT
Start: 2023-09-25 | End: 2023-09-25 | Stop reason: HOSPADM

## 2023-09-24 RX ORDER — POTASSIUM CHLORIDE 20 MEQ/1
40 TABLET, EXTENDED RELEASE ORAL ONCE
Status: COMPLETED | OUTPATIENT
Start: 2023-09-24 | End: 2023-09-24

## 2023-09-24 RX ORDER — SUCRALFATE 1 G/1
1 TABLET ORAL ONCE
Status: COMPLETED | OUTPATIENT
Start: 2023-09-24 | End: 2023-09-24

## 2023-09-24 RX ORDER — PANTOPRAZOLE SODIUM 40 MG/10ML
40 INJECTION, POWDER, LYOPHILIZED, FOR SOLUTION INTRAVENOUS ONCE
Status: COMPLETED | OUTPATIENT
Start: 2023-09-24 | End: 2023-09-24

## 2023-09-24 RX ORDER — BENZONATATE 100 MG/1
100 CAPSULE ORAL ONCE
Status: COMPLETED | OUTPATIENT
Start: 2023-09-24 | End: 2023-09-24

## 2023-09-24 RX ADMIN — BENZONATATE 100 MG: 100 CAPSULE ORAL at 15:05

## 2023-09-24 RX ADMIN — PANTOPRAZOLE SODIUM 40 MG: 40 INJECTION, POWDER, FOR SOLUTION INTRAVENOUS at 15:06

## 2023-09-24 RX ADMIN — SUCRALFATE 1 G: 1 TABLET ORAL at 15:06

## 2023-09-24 RX ADMIN — POTASSIUM CHLORIDE 40 MEQ: 1500 TABLET, EXTENDED RELEASE ORAL at 19:32

## 2023-09-24 NOTE — H&P
[address]  H&P  Name: Regine Prasad 57 y.o. female I MRN: [id number]  Unit/Bed#: -[facility] I Date of Admission: 9/24/2023   Date of Service: 9/24/2023 I Hospital Day: 0      Assessment/Plan   * Abdominal pain  Assessment & Plan  -Presenting today with ongoing abdominal pain and also reported chest pain. Was recently In the ED on 9/19 with melena with increasing bilateral abdominal pain and nausea. At that time, CTAP was negative for acute abdominal or pelvic pathology. She was discharged home. She had an outpatient right upper quadrant ultrasound on 9/21 which showed prominent CBD measuring up to 8 mm but no choledocholithiasis. Normal gallbladder.  -Coming in with similar pain but now chest pain with radiation to the back. She reported chest pressure going through to the back. It comes and goes. Reported it is gone after she got sucralfate. Same with abdominal pain. -JOAQUÍN score 0  -CXR appears normal on wet read, f/u final read   -Trop negative, repeat one more   - EKG shows sinus bradycardia with a rate of 57 bpm chronic T wave inversions in leads V2 to V3 no other ST-T changes. -Given her symptoms ED has reached out to GI team who reportedly will plan for an EGD tomorrow  - will make her NPO after MN   - Gi consult placed     Primary hypertension  Assessment & Plan  Reported she stopped hctz-losartan (because of cough and inc urinary frequency) - does not want to be restarted on either of these medications   Says her BP is sometimes low at home   We can monitor off and if needed she is ok with starting back a small dose of norvasc     Hypokalemia  Assessment & Plan  Mild K 3.4  Giving 40 meq klor    Hyperlipidemia  Assessment & Plan  Continue lipitor          VTE Pharmacologic Prophylaxis: VTE Score: 2 Low Risk (Score 0-2) - Encourage Ambulation. Code Status: Level 1 - Full Code pt confirmed  Discussion with family: Updated  () via phone.     Anticipated Length of Stay: Patient will be admitted on an observation basis with an anticipated length of stay of less than 2 midnights secondary to severe PUD, requiring EGD . Total Time Spent on Date of Encounter in care of patient: 30 mins. This time was spent on one or more of the following: performing physical exam; counseling and coordination of care; obtaining or reviewing history; documenting in the medical record; reviewing/ordering tests, medications or procedures; communicating with other healthcare professionals and discussing with patient's family/caregivers. Chief Complaint: Abdominal pain     History of Present Illness:  Chelsi Chavez is a 57 y.o. female with a PMH of hyperlipidemia, hypertension who presents with several weeks of abdominal pain. She came to the ED last week with abdominal pain and reportedly had a positive guaiac stool. She reports the pain has been ongoing. She also reported some chest pain today with radiation to the back which resolved completely after she received sucralfate and pantoprazole. She recently had right upper quadrant imaging that was unremarkable as an outpatient. Troponin evaluation was negative here. JOAQUÍN score 0. Chest pain and abdominal pain have almost completely resolved now. ED discussed with gastroenterology who will be planning for an EGD tomorrow. Review of Systems:  10 pt review of systems reviewed with patient and pertinent positive/negatives as per HPI. Past Medical and Surgical History:   Past Medical History:   Diagnosis Date   • Anemia    • Aortic valve disease    • Breast cancer ([address])    • Cardiac murmur    • Hyperlipidemia        Past Surgical History:   Procedure Laterality Date   • BREAST SURGERY      lumpectomy   • COLONOSCOPY     • ND ESOPHAGOGASTRODUODENOSCOPY TRANSORAL DIAGNOSTIC N/A 2/19/2019    Procedure: ESOPHAGOGASTRODUODENOSCOPY (EGD); Surgeon: Chani Frausto MD;  Location: [facility];   Service: Gastroenterology Meds/Allergies:  Prior to Admission medications    Medication Sig Start Date End Date Taking? Authorizing Provider   aspirin (Aspirin 81) 81 mg EC tablet Take 1 tablet (81 mg total) by mouth daily 6/12/23   Anshu Sierra MD   atorvastatin (LIPITOR) 40 mg tablet TAKE 1 TABLET BY MOUTH EVERY DAY 7/5/23   Anshu Sierra MD   Cholecalciferol (VITAMIN D3) 2000 units capsule Take 1 capsule by mouth daily 2/17/17   Historical Provider, MD   famotidine (PEPCID) 20 mg tablet Take 1 tablet (20 mg total) by mouth 2 (two) times a day as needed for heartburn or indigestion 9/19/23   Rivera Cotter MD   losartan-hydrochlorothiazide (HYZAAR) 50-12.5 mg per tablet TAKE 1 TABLET BY MOUTH EVERY DAY 8/24/23   Ying Berger MD   methylPREDNISolone 4 MG tablet therapy pack Use as directed on package  Patient not taking: Reported on 8/1/2023 6/15/23   Anshu Sierra MD   Omega-3 Fatty Acids (FISH OIL) 1,000 mg Take 1,000 mg by mouth daily    Historical Provider, MD   ondansetron (ZOFRAN) 4 mg tablet Take 1 tablet (4 mg total) by mouth every 6 (six) hours as needed for nausea or vomiting 7/21/23   Caitlyn Moore MD   pantoprazole (PROTONIX) 20 mg tablet Take 1 tablet (20 mg total) by mouth daily 9/19/23 10/20/23  Rivera Cotter MD     I have reviewed home medications with patient personally.     Allergies: No Known Allergies    Social History:  Marital Status: /Civil Union   Occupation: unknown  Patient Pre-hospital Living Situation: Home  Patient Pre-hospital Level of Mobility: walks  Patient Pre-hospital Diet Restrictions: none  Substance Use History:   Social History     Substance and Sexual Activity   Alcohol Use Not Currently    Comment: social     Social History     Tobacco Use   Smoking Status Never   Smokeless Tobacco Never     Social History     Substance and Sexual Activity   Drug Use No       Family History:  Family History   Problem Relation Age of Onset   • No Known Problems Mother    • Hypertension Father        Physical Exam:     Vitals:   Blood Pressure: 137/62 (09/24/23 1730)  Pulse: (!) 53 (09/24/23 1730)  Temperature: 98.1 °F (36.7 °C) (09/24/23 1355)  Temp Source: Oral (09/24/23 1355)  Respirations: 20 (09/24/23 1730)  SpO2: 97 % (09/24/23 1730)    Physical Exam   General: No acute distress, appears comfortable  HEENT: Normal conjunctiva, EOMI  Neck: No palpable lymphadenopathy, no JVD  Heart: Regular rate and rhythm, no murmurs  Lungs: Clear lungs, nonlabored respirations, no wheezing  Abdomen: Nontender, nondistended  Extremities: No pitting edema in bilateral lower extremities  Neuro: Alert and oriented x3, no focal deficits  Psych: Cooperative/calm      Additional Data:     Lab Results:  Results from last 7 days   Lab Units 09/24/23  1453   WBC Thousand/uL 4.77   HEMOGLOBIN g/dL 14.0   HEMATOCRIT % 42.9   PLATELETS Thousands/uL 271   NEUTROS PCT % 69   LYMPHS PCT % 27   MONOS PCT % 4   EOS PCT % 0     Results from last 7 days   Lab Units 09/24/23  1545   SODIUM mmol/L 145   POTASSIUM mmol/L 3.4*   CHLORIDE mmol/L 111*   CO2 mmol/L 28   BUN mg/dL 5   CREATININE mg/dL 0.60   ANION GAP mmol/L 6   CALCIUM mg/dL 8.4   ALBUMIN g/dL 3.8   TOTAL BILIRUBIN mg/dL 0.81   ALK PHOS U/L 71   ALT U/L 23   AST U/L 17   GLUCOSE RANDOM mg/dL 98     Results from last 7 days   Lab Units 09/24/23  1453   INR  0.90             Results from last 7 days   Lab Units 09/19/23  1635   LACTIC ACID mmol/L 0.9       Lines/Drains:  Invasive Devices     Peripheral Intravenous Line  Duration           Peripheral IV 09/24/23 Right Antecubital <1 day                    Imaging: Reviewed radiology reports from this admission including: chest xray  XR chest 2 views    (Results Pending)       EKG and Other Studies Reviewed on Admission:   · EKG: Sinus Bradycardia. HR 50s. ** Please Note: This note has been constructed using a voice recognition system.  **

## 2023-09-24 NOTE — ED PROVIDER NOTES
History  Chief Complaint   Patient presents with   • Chest Pain     Pt c/o chest pain that radiates into her back x 2 days, pt also c/o a cough      57 y.o. F presents w chest pain that radiates into her back x 2 days, pt also c/o a cough    Seen by GI, attempted course of PPI which improved the melena, afterher PPI course, the melana and epigastric pain came back. EGD is scheduled for next month. Read from prior CT scan:  1) No acute abdominal or pelvic pathology.    2) Normal appendix. Evaluation for bowel inflammation also somewhat limited by underdistention and lack of oral contrast, however no convincing inflammatory changes. Please note mild inflammation may not be apparent.    3) No bowel obstruction.    4) Additional findings as above. Prior to Admission Medications   Prescriptions Last Dose Informant Patient Reported? Taking?    Cholecalciferol (VITAMIN D3) 2000 units capsule 9/23/2023 Self Yes Yes   Sig: Take 1 capsule by mouth daily   Omega-3 Fatty Acids (FISH OIL) 1,000 mg 9/23/2023 Self Yes Yes   Sig: Take 1,000 mg by mouth daily   aspirin (Aspirin 81) 81 mg EC tablet Past Week Self No Yes   Sig: Take 1 tablet (81 mg total) by mouth daily   atorvastatin (LIPITOR) 40 mg tablet Past Week Self No Yes   Sig: TAKE 1 TABLET BY MOUTH EVERY DAY   famotidine (PEPCID) 20 mg tablet Not Taking  No No   Sig: Take 1 tablet (20 mg total) by mouth 2 (two) times a day as needed for heartburn or indigestion   Patient not taking: Reported on 9/24/2023   losartan-hydrochlorothiazide (HYZAAR) 50-12.5 mg per tablet Not Taking Self No No   Sig: TAKE 1 TABLET BY MOUTH EVERY DAY   Patient not taking: Reported on 9/24/2023   methylPREDNISolone 4 MG tablet therapy pack Not Taking Self No No   Sig: Use as directed on package   Patient not taking: Reported on 8/1/2023   ondansetron (ZOFRAN) 4 mg tablet 9/23/2023 Self No Yes   Sig: Take 1 tablet (4 mg total) by mouth every 6 (six) hours as needed for nausea or vomiting   pantoprazole (PROTONIX) 20 mg tablet 9/23/2023  No Yes   Sig: Take 1 tablet (20 mg total) by mouth daily      Facility-Administered Medications: None       Past Medical History:   Diagnosis Date   • Anemia    • Aortic valve disease    • Breast cancer (HCC)    • Cardiac murmur    • Hyperlipidemia        Past Surgical History:   Procedure Laterality Date   • BREAST SURGERY      lumpectomy   • COLONOSCOPY     • [place] ESOPHAGOGASTRODUODENOSCOPY TRANSORAL DIAGNOSTIC N/A 2/19/2019    Procedure: ESOPHAGOGASTRODUODENOSCOPY (EGD); Surgeon: Doug Herman MD;  Location: [facility]; Service: Gastroenterology       Family History   Problem Relation Age of Onset   • No Known Problems Mother    • Hypertension Father      I have reviewed and agree with the history as documented. E-Cigarette/Vaping   • E-Cigarette Use Never User      E-Cigarette/Vaping Substances   • Nicotine No    • THC No    • CBD No    • Flavoring No    • Other No    • Unknown No      Social History     Tobacco Use   • Smoking status: Never   • Smokeless tobacco: Never   Vaping Use   • Vaping Use: Never used   Substance Use Topics   • Alcohol use: Not Currently     Comment: social   • Drug use: No       Review of Systems   Constitutional: Positive for appetite change. Negative for chills and fever. HENT: Positive for sore throat. Negative for congestion and rhinorrhea. Respiratory: Positive for cough. Negative for chest tightness and shortness of breath. Cardiovascular: Positive for chest pain. Negative for leg swelling. Gastrointestinal: Positive for abdominal pain, blood in stool and nausea. Negative for constipation, diarrhea and vomiting. Genitourinary: Negative for dysuria and flank pain. Musculoskeletal: Negative for back pain and neck pain. Skin: Negative for wound. Neurological: Negative for dizziness and headaches. Physical Exam  Physical Exam  Vitals reviewed.    Constitutional:       General: She is not in acute distress. Appearance: She is well-developed. She is not ill-appearing, toxic-appearing or diaphoretic. HENT:      Head: Normocephalic and atraumatic. Eyes:      General: No scleral icterus. Right eye: No discharge. Left eye: No discharge. Conjunctiva/sclera: Conjunctivae normal.      Pupils: Pupils are equal, round, and reactive to light. Neck:      Vascular: No JVD. Cardiovascular:      Rate and Rhythm: Normal rate and regular rhythm. Heart sounds: Normal heart sounds. No murmur heard. No friction rub. No gallop. Pulmonary:      Effort: Pulmonary effort is normal. No respiratory distress. Breath sounds: Normal breath sounds. No decreased breath sounds, wheezing, rhonchi or rales. Chest:      Chest wall: Tenderness present. Abdominal:      General: Bowel sounds are normal. There is no distension. Palpations: Abdomen is soft. Tenderness: There is abdominal tenderness (epigastric). There is no guarding or rebound. Musculoskeletal:         General: No tenderness or deformity. Normal range of motion. Cervical back: Normal range of motion and neck supple. Right lower leg: No tenderness. No edema. Left lower leg: No tenderness. No edema. Skin:     General: Skin is warm and dry. Coloration: Skin is not pale. Findings: No erythema or rash. Neurological:      Mental Status: She is alert and oriented to person, place, and time. Cranial Nerves: No cranial nerve deficit.    Psychiatric:         Behavior: Behavior normal.         Vital Signs  ED Triage Vitals   Temperature Pulse Respirations Blood Pressure SpO2   09/24/23 1355 09/24/23 1355 09/24/23 1355 09/24/23 1355 09/24/23 1355   98.1 °F (36.7 °C) 70 16 (!) 181/81 97 %      Temp Source Heart Rate Source Patient Position - Orthostatic VS BP Location FiO2 (%)   09/24/23 1355 09/24/23 1355 09/24/23 1355 09/24/23 1355 --   Oral Monitor Lying Right arm       Pain Score       09/24/23 1930       No Pain           Vitals:    09/24/23 1916 09/25/23 0751 09/25/23 1416 09/25/23 1524   BP: 146/80 144/80 152/71 107/55   Pulse: 60 56 64 68   Patient Position - Orthostatic VS:  Lying           Visual Acuity      ED Medications  Medications   atorvastatin (LIPITOR) tablet 40 mg (40 mg Oral Not Given 9/25/23 0800)   sucralfate (CARAFATE) tablet 1 g (1 g Oral Not Given 9/25/23 0753)   pantoprazole (PROTONIX) injection 40 mg (40 mg Intravenous Given 9/25/23 0756)   lactated ringers infusion ( Intravenous Restarted 9/25/23 1524)   potassium chloride oral solution 40 mEq (has no administration in time range)   benzonatate (TESSALON PERLES) capsule 100 mg (100 mg Oral Given 9/24/23 1505)   pantoprazole (PROTONIX) injection 40 mg (40 mg Intravenous Given 9/24/23 1506)   sucralfate (CARAFATE) tablet 1 g (1 g Oral Given 9/24/23 1506)   potassium chloride (K-DUR,KLOR-CON) CR tablet 40 mEq (40 mEq Oral Given 9/24/23 1932)       Diagnostic Studies  Results Reviewed     Procedure Component Value Units Date/Time    HS Troponin I 4hr [[id number]]  (Normal) Collected: 09/24/23 1957    Lab Status: Final result Specimen: Blood from Arm, Right Updated: 09/24/23 2035     hs TnI 4hr 4 ng/L      Delta 4hr hsTnI 2 ng/L     Lipase [[id number]]  (Normal) Collected: 09/24/23 1545    Lab Status: Final result Specimen: Blood from Arm, Right Updated: 09/24/23 1621     Lipase 16 u/L     Comprehensive metabolic panel [[id number]]  (Abnormal) Collected: 09/24/23 1545    Lab Status: Final result Specimen: Blood from Arm, Right Updated: 09/24/23 1621     Sodium 145 mmol/L      Potassium 3.4 mmol/L      Chloride 111 mmol/L      CO2 28 mmol/L      ANION GAP 6 mmol/L      BUN 5 mg/dL      Creatinine 0.60 mg/dL      Glucose 98 mg/dL      Calcium 8.4 mg/dL      AST 17 U/L      ALT 23 U/L      Alkaline Phosphatase 71 U/L      Total Protein 5.9 g/dL      Albumin 3.8 g/dL      Total Bilirubin 0.81 mg/dL      eGFR 96 ml/min/1.73sq m     Narrative: [facility] guidelines for Chronic Kidney Disease (CKD):   •  Stage 1 with normal or high GFR (GFR > 90 mL/min/1.73 square meters)  •  Stage 2 Mild CKD (GFR = 60-89 mL/min/1.73 square meters)  •  Stage 3A Moderate CKD (GFR = 45-59 mL/min/1.73 square meters)  •  Stage 3B Moderate CKD (GFR = 30-44 mL/min/1.73 square meters)  •  Stage 4 Severe CKD (GFR = 15-29 mL/min/1.73 square meters)  •  Stage 5 End Stage CKD (GFR <15 mL/min/1.73 square meters)  Note: GFR calculation is accurate only with a steady state creatinine    HS Troponin I 2hr [[id number]]  (Normal) Collected: 09/24/23 1545    Lab Status: Final result Specimen: Blood from Arm, Right Updated: 09/24/23 1619     hs TnI 2hr 3 ng/L      Delta 2hr hsTnI 1 ng/L     COVID/FLU/RSV [[id number]]  (Normal) Collected: 09/24/23 1453    Lab Status: Final result Specimen: Nares from Nose Updated: 09/24/23 1546     SARS-CoV-2 Negative     INFLUENZA A PCR Negative     INFLUENZA B PCR Negative     RSV PCR Negative    Narrative:      FOR PEDIATRIC PATIENTS - copy/paste COVID Guidelines URL to browser: [email or web address]    SARS-CoV-2 assay is a Nucleic Acid Amplification assay intended for the  qualitative detection of nucleic acid from SARS-CoV-2 in nasopharyngeal  swabs. Results are for the presumptive identification of SARS-CoV-2 RNA. Positive results are indicative of infection with SARS-CoV-2, the virus  causing COVID-19, but do not rule out bacterial infection or co-infection  with other viruses. Laboratories within the [facility] and its  territories are required to report all positive results to the appropriate  public health authorities. Negative results do not preclude SARS-CoV-2  infection and should not be used as the sole basis for treatment or other  patient management decisions.  Negative results must be combined with  clinical observations, patient history, and epidemiological information. This test has not been FDA cleared or approved. This test has been authorized by FDA under an Emergency Use Authorization  (EUA). This test is only authorized for the duration of time the  declaration that circumstances exist justifying the authorization of the  emergency use of an in vitro diagnostic tests for detection of SARS-CoV-2  virus and/or diagnosis of COVID-19 infection under section 564(b)(1) of  the Act, 21 U. S.C. 134OFX-7(P)(3), unless the authorization is terminated  or revoked sooner. The test has been validated but independent review by FDA  and CLIA is pending. Test performed using [employer] GeneXpert: This RT-PCR assay targets N2,  a region unique to SARS-CoV-2. A conserved region in the E-gene was chosen  for pan-Sarbecovirus detection which includes SARS-CoV-2. According to CMS-2020-01-R, this platform meets the definition of high-throughput technology.     Liu Freitas [[id number]]  (Normal) Collected: 09/24/23 1453    Lab Status: Final result Specimen: Blood from Arm, Right Updated: 09/24/23 1538     Protime 12.8 seconds      INR 0.90    APTT [[id number]]  (Normal) Collected: 09/24/23 1453    Lab Status: Final result Specimen: Blood from Arm, Right Updated: 09/24/23 1538     PTT 23 seconds     Strep A PCR [[id number]]  (Normal) Collected: 09/24/23 1453    Lab Status: Final result Specimen: Throat Updated: 09/24/23 1534     STREP A PCR Not Detected    HS Troponin 0hr (reflex protocol) [[id number]]  (Normal) Collected: 09/24/23 1453    Lab Status: Final result Specimen: Blood from Arm, Right Updated: 09/24/23 1528     hs TnI 0hr 2 ng/L     CBC and differential [[id number]] Collected: 09/24/23 1453    Lab Status: Final result Specimen: Blood from Arm, Right Updated: 09/24/23 1503     WBC 4.77 Thousand/uL      RBC 4.63 Million/uL      Hemoglobin 14.0 g/dL      Hematocrit 42.9 %      MCV 93 fL      MCH 30.2 pg      MCHC 32.6 g/dL      RDW 12.9 %      MPV 9.9 fL      Platelets 848 Thousands/uL      nRBC 0 /100 WBCs      Neutrophils Relative 69 %      Immat GRANS % 0 %      Lymphocytes Relative 27 %      Monocytes Relative 4 %      Eosinophils Relative 0 %      Basophils Relative 0 %      Neutrophils Absolute 3.24 Thousands/µL      Immature Grans Absolute 0.01 Thousand/uL      Lymphocytes Absolute 1.28 Thousands/µL      Monocytes Absolute 0.20 Thousand/µL      Eosinophils Absolute 0.02 Thousand/µL      Basophils Absolute 0.02 Thousands/µL                  VAS carotid complete study   Final Result by Andie Snidre DO (09/25 1202)      XR chest 2 views   Final Result by Peyman Rudolph MD (09/25 6034)      No active pulmonary disease. Workstation performed: [id number]                    Procedures  Procedures   ekg - no stemi, no ischemia      ED Course  ED Course as of 09/25/23 1524   Sun Sep 24, 2023   1528 Impression:  1. CBD is mildly prominent measuring up to 8 mm in diameter but tapers normally. No choledocholithiasis. This can be further evaluated with MRCP. 2.  Normal gallbladder. SBIRT 22yo+    Flowsheet Row Most Recent Value   Initial Alcohol Screen: US AUDIT-C     1. How often do you have a drink containing alcohol? 0 Filed at: 09/24/2023 1454   2. How many drinks containing alcohol do you have on a typical day you are drinking? 0 Filed at: 09/24/2023 1454   3b. FEMALE Any Age, or MALE 65+: How often do you have 4 or more drinks on one occassion? 0 Filed at: 09/24/2023 1454   Audit-C Score 0 Filed at: 09/24/2023 1454   JESSE: How many times in the past year have you. .. Used an illegal drug or used a prescription medication for non-medical reasons?  Never Filed at: 09/24/2023 1454        JOAQUÍN Risk Score    Flowsheet Row Most Recent Value   Age >= 72 0 Filed at: 09/24/2023 1912   Known CAD (stenosis >= 50%) 0 Filed at: 09/24/2023 1912   Recent (<=24 hrs) Service Angina 0 Filed at: 09/24/2023 1912   ST Deviation >= 0.5 mm 0 Filed at: 09/24/2023 1912   3+ CAD Risk Factors (FHx, HTN, HLP, DM, Smoker) 0 Filed at: 09/24/2023 1912   Aspirin Use Past 7 Days 0 Filed at: 09/24/2023 1912   Elevated Cardiac Markers 0 Filed at: 09/24/2023 1912   JOAQUÍN Risk Score (Calculated) 0 Filed at: 09/24/2023 1912                  Medical Decision Making  CP - ACS w/u is negative. Likely reflux esophagitis. Likely bleeding gastric ulcer. Admission for EDG and GI consult. Amount and/or Complexity of Data Reviewed  Labs: ordered. Radiology: ordered. Risk  Prescription drug management. Decision regarding hospitalization. Disposition  Final diagnoses:   Melena   Reflux esophagitis     Time reflects when diagnosis was documented in both MDM as applicable and the Disposition within this note     Time User Action Codes Description Comment    9/24/2023  5:43 PM Honey Escamilla Add [K92.1] Melena     9/24/2023  5:43 PM Karthik Medellin Add [K21.00] Reflux esophagitis     9/25/2023  9:58 AM Chapin Llanos Add [R10.13] Epigastric pain     9/25/2023  3:20 PM Charlotte Amin [K92.1] Melena     9/25/2023  3:20 PM Sylwia Gill Modify [K21.00] Reflux esophagitis     9/25/2023  3:20 PM Sylwia Gill Modify [R10.13] Epigastric pain       ED Disposition     ED Disposition   Admit    Condition   Stable    Date/Time   Sun Sep 24, 2023  5:43 PM    Comment   [facility] and the patient's admission status was agreed to be Admission Status: observation status to the service of Dr. Ashish Little . Follow-up Information    None         Current Discharge Medication List      CONTINUE these medications which have NOT CHANGED    Details   aspirin (Aspirin 81) 81 mg EC tablet Take 1 tablet (81 mg total) by mouth daily  Qty: 90 tablet, Refills: 3    Associated Diagnoses: Left foot drop;  Left leg weakness      atorvastatin (LIPITOR) 40 mg tablet TAKE 1 TABLET BY MOUTH EVERY DAY  Qty: 90 tablet, Refills: 2    Associated Diagnoses: Mixed hyperlipidemia      Cholecalciferol (VITAMIN D3) 2000 units capsule Take 1 capsule by mouth daily      Omega-3 Fatty Acids (FISH OIL) 1,000 mg Take 1,000 mg by mouth daily      ondansetron (ZOFRAN) 4 mg tablet Take 1 tablet (4 mg total) by mouth every 6 (six) hours as needed for nausea or vomiting  Qty: 30 tablet, Refills: 0    Associated Diagnoses: Nausea and vomiting, unspecified vomiting type      pantoprazole (PROTONIX) 20 mg tablet Take 1 tablet (20 mg total) by mouth daily  Qty: 31 tablet, Refills: 0    Associated Diagnoses: GI bleed; Melena; Abdominal pain      famotidine (PEPCID) 20 mg tablet Take 1 tablet (20 mg total) by mouth 2 (two) times a day as needed for heartburn or indigestion  Qty: 60 tablet, Refills: 0    Associated Diagnoses: GI bleed; Melena; Abdominal pain      losartan-hydrochlorothiazide (HYZAAR) 50-12.5 mg per tablet TAKE 1 TABLET BY MOUTH EVERY DAY  Qty: 90 tablet, Refills: 1    Associated Diagnoses: Primary hypertension      methylPREDNISolone 4 MG tablet therapy pack Use as directed on package  Qty: 21 each, Refills: 0    Associated Diagnoses: Left foot drop             No discharge procedures on file.     PDMP Review     None          ED Provider  Electronically Signed by           Mela Marx DO  09/25/23 9567

## 2023-09-24 NOTE — ASSESSMENT & PLAN NOTE
Reported she stopped hctz-losartan (because of cough and inc urinary frequency) - does not want to be restarted on either of these medications   Says her BP is sometimes low at home   We can monitor off and if needed she is ok with starting back a small dose of norvasc

## 2023-09-24 NOTE — ASSESSMENT & PLAN NOTE
-Presenting today with ongoing abdominal pain and also reported chest pain. Was recently In the ED on 9/19 with melena with increasing bilateral abdominal pain and nausea. At that time, CTAP was negative for acute abdominal or pelvic pathology. She was discharged home. She had an outpatient right upper quadrant ultrasound on 9/21 which showed prominent CBD measuring up to 8 mm but no choledocholithiasis. Normal gallbladder.  -Coming in with similar pain but now chest pain with radiation to the back. She reported chest pressure going through to the back. It comes and goes. Reported it is gone after she got sucralfate. Same with abdominal pain. -JOAQUÍN score 0  -CXR appears normal on wet read, f/u final read   -Trop negative, repeat one more   - EKG shows sinus bradycardia with a rate of 57 bpm chronic T wave inversions in leads V2 to V3 no other ST-T changes.   -Given her symptoms ED has reached out to GI team who reportedly will plan for an EGD tomorrow  - will make her NPO after MN   - Gi consult placed   -Continue Sucralfate twice a day and IV pantoprazole

## 2023-09-24 NOTE — ASSESSMENT & PLAN NOTE
-Presenting today with ongoing chest pain. Was recently In the ED on 9/19 with melena with increasing bilateral abdominal pain and nausea. At that time, CTAP was negative for acute abdominal or pelvic pathology. She was discharged home. She had an outpatient right upper quadrant ultrasound on 9/21 which showed prominent CBD measuring up to 8 mm but no choledocholithiasis. Normal gallbladder.  -Coming in with similar pain but now chest pain with radiation to the back. She reported chest pressure going through to the back. It comes and goes. Reported it is gone after she got sucralfate. Same with abdominal pain. -CXR appears normal on wet read, f/u final read   -Trop negative, repeat one more   - EKG shows sinus bradycardia with a rate of 57 bpm chronic T wave inversions in leads V2 to V3 no other ST-T changes.   -Given her symptoms ED has reached out to GI team who will plan for an EGD tomorrow, will make her NPO after MN

## 2023-09-25 ENCOUNTER — ANESTHESIA (OUTPATIENT)
Dept: GASTROENTEROLOGY | Facility: HOSPITAL | Age: 63
End: 2023-09-25
Payer: COMMERCIAL

## 2023-09-25 ENCOUNTER — APPOINTMENT (OUTPATIENT)
Dept: GASTROENTEROLOGY | Facility: HOSPITAL | Age: 63
End: 2023-09-25
Payer: COMMERCIAL

## 2023-09-25 ENCOUNTER — TELEPHONE (OUTPATIENT)
Age: 63
End: 2023-09-25

## 2023-09-25 ENCOUNTER — APPOINTMENT (OUTPATIENT)
Dept: VASCULAR ULTRASOUND | Facility: HOSPITAL | Age: 63
End: 2023-09-25
Payer: COMMERCIAL

## 2023-09-25 ENCOUNTER — ANESTHESIA EVENT (OUTPATIENT)
Dept: GASTROENTEROLOGY | Facility: HOSPITAL | Age: 63
End: 2023-09-25
Payer: COMMERCIAL

## 2023-09-25 VITALS
HEIGHT: 63 IN | DIASTOLIC BLOOD PRESSURE: 78 MMHG | WEIGHT: 119.05 LBS | BODY MASS INDEX: 21.09 KG/M2 | SYSTOLIC BLOOD PRESSURE: 139 MMHG | RESPIRATION RATE: 18 BRPM | TEMPERATURE: 97.8 F | OXYGEN SATURATION: 97 % | HEART RATE: 63 BPM

## 2023-09-25 PROBLEM — E87.6 HYPOKALEMIA: Status: RESOLVED | Noted: 2023-09-24 | Resolved: 2023-09-25

## 2023-09-25 PROBLEM — R09.89 ABNORMAL CAROTID PULSE: Status: ACTIVE | Noted: 2023-09-25

## 2023-09-25 LAB
ANION GAP SERPL CALCULATED.3IONS-SCNC: 8 MMOL/L
ATRIAL RATE: 57 BPM
BASOPHILS # BLD AUTO: 0.02 THOUSANDS/ÂΜL (ref 0–0.1)
BASOPHILS NFR BLD AUTO: 0 % (ref 0–1)
BUN SERPL-MCNC: 11 MG/DL (ref 5–25)
CALCIUM SERPL-MCNC: 9.4 MG/DL (ref 8.4–10.2)
CHLORIDE SERPL-SCNC: 108 MMOL/L (ref 96–108)
CO2 SERPL-SCNC: 26 MMOL/L (ref 21–32)
CREAT SERPL-MCNC: 0.66 MG/DL (ref 0.6–1.3)
EOSINOPHIL # BLD AUTO: 0.05 THOUSAND/ÂΜL (ref 0–0.61)
EOSINOPHIL NFR BLD AUTO: 1 % (ref 0–6)
ERYTHROCYTE [DISTWIDTH] IN BLOOD BY AUTOMATED COUNT: 12.9 % (ref 11.6–15.1)
GFR SERPL CREATININE-BSD FRML MDRD: 93 ML/MIN/1.73SQ M
GLUCOSE P FAST SERPL-MCNC: 91 MG/DL (ref 65–99)
GLUCOSE SERPL-MCNC: 91 MG/DL (ref 65–140)
HCT VFR BLD AUTO: 38.5 % (ref 34.8–46.1)
HGB BLD-MCNC: 12.6 G/DL (ref 11.5–15.4)
IMM GRANULOCYTES # BLD AUTO: 0.02 THOUSAND/UL (ref 0–0.2)
IMM GRANULOCYTES NFR BLD AUTO: 0 % (ref 0–2)
LYMPHOCYTES # BLD AUTO: 1.73 THOUSANDS/ÂΜL (ref 0.6–4.47)
LYMPHOCYTES NFR BLD AUTO: 33 % (ref 14–44)
MAGNESIUM SERPL-MCNC: 2.3 MG/DL (ref 1.9–2.7)
MCH RBC QN AUTO: 29.7 PG (ref 26.8–34.3)
MCHC RBC AUTO-ENTMCNC: 32.7 G/DL (ref 31.4–37.4)
MCV RBC AUTO: 91 FL (ref 82–98)
MONOCYTES # BLD AUTO: 0.33 THOUSAND/ÂΜL (ref 0.17–1.22)
MONOCYTES NFR BLD AUTO: 6 % (ref 4–12)
NEUTROPHILS # BLD AUTO: 3.06 THOUSANDS/ÂΜL (ref 1.85–7.62)
NEUTS SEG NFR BLD AUTO: 60 % (ref 43–75)
NRBC BLD AUTO-RTO: 0 /100 WBCS
P AXIS: 119 DEGREES
PLATELET # BLD AUTO: 237 THOUSANDS/UL (ref 149–390)
PMV BLD AUTO: 9.1 FL (ref 8.9–12.7)
POTASSIUM SERPL-SCNC: 3.5 MMOL/L (ref 3.5–5.3)
PR INTERVAL: 144 MS
QRS AXIS: 68 DEGREES
QRSD INTERVAL: 92 MS
QT INTERVAL: 456 MS
QTC INTERVAL: 443 MS
RBC # BLD AUTO: 4.24 MILLION/UL (ref 3.81–5.12)
SODIUM SERPL-SCNC: 142 MMOL/L (ref 135–147)
T WAVE AXIS: 53 DEGREES
VENTRICULAR RATE: 57 BPM
WBC # BLD AUTO: 5.21 THOUSAND/UL (ref 4.31–10.16)

## 2023-09-25 PROCEDURE — 88305 TISSUE EXAM BY PATHOLOGIST: CPT | Performed by: PATHOLOGY

## 2023-09-25 PROCEDURE — C9113 INJ PANTOPRAZOLE SODIUM, VIA: HCPCS | Performed by: STUDENT IN AN ORGANIZED HEALTH CARE EDUCATION/TRAINING PROGRAM

## 2023-09-25 PROCEDURE — 99239 HOSP IP/OBS DSCHRG MGMT >30: CPT | Performed by: PHYSICIAN ASSISTANT

## 2023-09-25 PROCEDURE — 83735 ASSAY OF MAGNESIUM: CPT | Performed by: STUDENT IN AN ORGANIZED HEALTH CARE EDUCATION/TRAINING PROGRAM

## 2023-09-25 PROCEDURE — NC001 PR NO CHARGE: Performed by: PHYSICIAN ASSISTANT

## 2023-09-25 PROCEDURE — 85025 COMPLETE CBC W/AUTO DIFF WBC: CPT | Performed by: STUDENT IN AN ORGANIZED HEALTH CARE EDUCATION/TRAINING PROGRAM

## 2023-09-25 PROCEDURE — 93880 EXTRACRANIAL BILAT STUDY: CPT | Performed by: SURGERY

## 2023-09-25 PROCEDURE — 93880 EXTRACRANIAL BILAT STUDY: CPT

## 2023-09-25 PROCEDURE — 80048 BASIC METABOLIC PNL TOTAL CA: CPT | Performed by: STUDENT IN AN ORGANIZED HEALTH CARE EDUCATION/TRAINING PROGRAM

## 2023-09-25 PROCEDURE — 93010 ELECTROCARDIOGRAM REPORT: CPT | Performed by: INTERNAL MEDICINE

## 2023-09-25 RX ORDER — LIDOCAINE HYDROCHLORIDE 20 MG/ML
INJECTION, SOLUTION EPIDURAL; INFILTRATION; INTRACAUDAL; PERINEURAL AS NEEDED
Status: DISCONTINUED | OUTPATIENT
Start: 2023-09-25 | End: 2023-09-25

## 2023-09-25 RX ORDER — SODIUM CHLORIDE, SODIUM LACTATE, POTASSIUM CHLORIDE, CALCIUM CHLORIDE 600; 310; 30; 20 MG/100ML; MG/100ML; MG/100ML; MG/100ML
75 INJECTION, SOLUTION INTRAVENOUS CONTINUOUS
Status: DISCONTINUED | OUTPATIENT
Start: 2023-09-25 | End: 2023-09-25 | Stop reason: HOSPADM

## 2023-09-25 RX ORDER — PROPOFOL 10 MG/ML
INJECTION, EMULSION INTRAVENOUS AS NEEDED
Status: DISCONTINUED | OUTPATIENT
Start: 2023-09-25 | End: 2023-09-25

## 2023-09-25 RX ORDER — SUCRALFATE 1 G/1
1 TABLET ORAL
Qty: 28 TABLET | Refills: 0 | Status: SHIPPED | OUTPATIENT
Start: 2023-09-26 | End: 2023-10-10

## 2023-09-25 RX ORDER — POTASSIUM CHLORIDE 20MEQ/15ML
40 LIQUID (ML) ORAL ONCE
Status: COMPLETED | OUTPATIENT
Start: 2023-09-25 | End: 2023-09-25

## 2023-09-25 RX ADMIN — PROPOFOL 25 MG: 10 INJECTION, EMULSION INTRAVENOUS at 15:18

## 2023-09-25 RX ADMIN — SODIUM CHLORIDE, SODIUM LACTATE, POTASSIUM CHLORIDE, AND CALCIUM CHLORIDE 75 ML/HR: .6; .31; .03; .02 INJECTION, SOLUTION INTRAVENOUS at 11:14

## 2023-09-25 RX ADMIN — PANTOPRAZOLE SODIUM 40 MG: 40 INJECTION, POWDER, FOR SOLUTION INTRAVENOUS at 07:56

## 2023-09-25 RX ADMIN — PROPOFOL 35 MG: 10 INJECTION, EMULSION INTRAVENOUS at 15:14

## 2023-09-25 RX ADMIN — LIDOCAINE HYDROCHLORIDE 100 MG: 20 INJECTION, SOLUTION EPIDURAL; INFILTRATION; INTRACAUDAL at 15:13

## 2023-09-25 RX ADMIN — POTASSIUM CHLORIDE 40 MEQ: 1.5 SOLUTION ORAL at 16:05

## 2023-09-25 RX ADMIN — PROPOFOL 35 MG: 10 INJECTION, EMULSION INTRAVENOUS at 15:15

## 2023-09-25 NOTE — PLAN OF CARE
Problem: PAIN - ADULT  Goal: Verbalizes/displays adequate comfort level or baseline comfort level  Description: Interventions:  - Encourage patient to monitor pain and request assistance  - Assess pain using appropriate pain scale  - Administer analgesics based on type and severity of pain and evaluate response  - Implement non-pharmacological measures as appropriate and evaluate response  - Consider cultural and social influences on pain and pain management  - Notify physician/advanced practitioner if interventions unsuccessful or patient reports new pain  Outcome: Progressing     Problem: INFECTION - ADULT  Goal: Absence or prevention of progression during hospitalization  Description: INTERVENTIONS:  - Assess and monitor for signs and symptoms of infection  - Monitor lab/diagnostic results  - Monitor all insertion sites, i.e. indwelling lines, tubes, and drains  - Monitor endotracheal if appropriate and nasal secretions for changes in amount and color  - Thornton appropriate cooling/warming therapies per order  - Administer medications as ordered  - Instruct and encourage patient and family to use good hand hygiene technique  - Identify and instruct in appropriate isolation precautions for identified infection/condition  Outcome: Progressing     Problem: DISCHARGE PLANNING  Goal: Discharge to home or other facility with appropriate resources  Description: INTERVENTIONS:  - Identify barriers to discharge w/patient and caregiver  - Arrange for needed discharge resources and transportation as appropriate  - Identify discharge learning needs (meds, wound care, etc.)  - Arrange for interpretive services to assist at discharge as needed  - Refer to Case Management Department for coordinating discharge planning if the patient needs post-hospital services based on physician/advanced practitioner order or complex needs related to functional status, cognitive ability, or social support system  Outcome: Progressing

## 2023-09-25 NOTE — QUICK NOTE
Contacted by nursing for evaluation of mass on patient's neck. Patient sitting upright and comfortably in the recliner chair. No acute complaints. There is a large pulsating mass noted on the right side of her neck. Reports that she noticed this for a few months to a year. No concern for JVD at this time given pulsating character. Patient with history of AVD, HLD, HTN, left carotid bruit. Echo (12/29/23) with normal diastolic and systolic function, mild regurgitation of A,M,T,P valves noted. Patient denies any lightheadedness, chest pain, SOB. Vital signs stable. Will obtain US soft tissue head/neck for further evaluation.

## 2023-09-25 NOTE — TELEPHONE ENCOUNTER
Patient is currently admitted at [address] [place] [address]. What would Dr. Gwen Malin like to do?

## 2023-09-25 NOTE — TELEPHONE ENCOUNTER
----- Message from Brigitte Cain MD sent at 9/24/2023  4:16 PM EDT -----  Please have patient follow up with me first available in the office to review sono results and progress

## 2023-09-25 NOTE — PLAN OF CARE
Patient given discharge instructions, hep lock removed. Medications from pharmacy returned to patient. Aware of f/u appointments. Discharged, walking to main lobby with spouse.

## 2023-09-25 NOTE — ASSESSMENT & PLAN NOTE
· Reported stopped hctz-losartan   · Reported BP is sometimes low at home   · We can monitor off meds and if needed she is ok with starting back a small dose of norvasc

## 2023-09-25 NOTE — ASSESSMENT & PLAN NOTE
· K: 3.4 on admission; 3.5 today  · Recieved additional 40 meq supplement   · Follow up outpatient, suspect from poor oral intake in setting of abdominal pain

## 2023-09-25 NOTE — PROGRESS NOTES
[address]  Progress Note  Name: Abdullahi Lan  MRN: [id number]  Unit/Bed#: -01 I Date of Admission: 9/24/2023   Date of Service: 9/25/2023 I Hospital Day: 0    Assessment/Plan   Hypokalemia  Assessment & Plan  · K: 3.4 on admission; 3.5 today  · Recieved 40 meq klor    * Abdominal pain  Assessment & Plan  Presenting today with ongoing abdominal pain also + chest pain. In the ED on 9/19 w/ melena with increasing bilateral abdominal pain and nausea. CTAP was negative for acute abdominal or pelvic pathology - discharged home. outpatient RUQ U/S 9/21 showed prominent CBD measuring up to 8 mm but no choledocholithiasis. Normal gallbladder. Presenting w/ similar pain but now chest pain with radiation to the back. She reported chest pressure going through to the back. It comes and goes. Reported it is gone after she got sucralfate. Same with abdominal pain. · JOAQUÍN score 0  · CXR shows no active pulmonary disease  · Trops negative  · EKG shows sinus bradycardia with a rate of 57 bpm chronic T wave inversions in leads V2 to V3 no other ST-T changes. · GI consult  · EGD today  · If EGD is WNL then recommend MRCP  · Outpatient colonoscopy   · Continue pantoprazole twice a day  · Continue Sucralfate twice a day     Hyperlipidemia  Assessment & Plan  · Continue lipitor      Primary hypertension  Assessment & Plan  · Reported stopped hctz-losartan   · Reported BP is sometimes low at home   · We can monitor off meds and if needed she is ok with starting back a small dose of norvasc            VTE Pharmacologic Prophylaxis: VTE Score: 2 Low Risk (Score 0-2) - Encourage Ambulation. Patient Centered Rounds: I performed bedside rounds with nursing staff today. Discussions with Specialists or Other Care Team Provider: Reviewed notes     Education and Discussions with Family / Patient: Patient declined call to .  reports she spoke to her  already today     Total Time Spent on Date of Encounter in care of patient: 40 mins. This time was spent on one or more of the following: performing physical exam; counseling and coordination of care; obtaining or reviewing history; documenting in the medical record; reviewing/ordering tests, medications or procedures; communicating with other healthcare professionals and discussing with patient's family/caregivers. Current Length of Stay: 0 day(s)  Current Patient Status: Observation   Certification Statement: The patient will continue to require additional inpatient hospital stay due to EGD  Discharge Plan: Anticipate discharge later today or tomorrow to home. Code Status: Level 1 - Full Code    Subjective:   Patient was laying comfortably in bed. States abdominal and chest pain are no longer present. Patient denies headache, dizziness, shortness of breath, N/V/D, and constipation. When asked about the pulsation on her neck, patient indicated she does not feel it. Objective:     Vitals:   Temp (24hrs), Av.9 °F (37.2 °C), Min:98.5 °F (36.9 °C), Max:99.6 °F (37.6 °C)    Temp:  [98.5 °F (36.9 °C)-99.6 °F (37.6 °C)] 98.5 °F (36.9 °C)  HR:  [52-60] 56  Resp:  [15-20] 16  BP: (137-155)/(62-80) 144/80  SpO2:  [95 %-98 %] 95 %  Body mass index is 21.09 kg/m². Input and Output Summary (last 24 hours): Intake/Output Summary (Last 24 hours) at 2023 1401  Last data filed at 2023 1300  Gross per 24 hour   Intake 0 ml   Output --   Net 0 ml       Physical Exam:   Physical Exam  Vitals and nursing note reviewed. Constitutional:       General: She is not in acute distress. Appearance: She is well-developed. HENT:      Head: Normocephalic and atraumatic. Eyes:      Conjunctiva/sclera: Conjunctivae normal.   Neck:      Comments: Abnormal pulsation on the right side of neck  Cardiovascular:      Rate and Rhythm: Normal rate and regular rhythm. Pulmonary:      Effort: Pulmonary effort is normal. No respiratory distress. Breath sounds: Normal breath sounds. Abdominal:      Palpations: Abdomen is soft. Tenderness: There is no abdominal tenderness. Musculoskeletal:         General: No swelling. Cervical back: Neck supple. Skin:     General: Skin is warm and dry. Neurological:      Mental Status: She is alert.    Psychiatric:         Mood and Affect: Mood normal.          Additional Data:     Labs:  Results from last 7 days   Lab Units 09/25/23  0445   WBC Thousand/uL 5.21   HEMOGLOBIN g/dL 12.6   HEMATOCRIT % 38.5   PLATELETS Thousands/uL 237   NEUTROS PCT % 60   LYMPHS PCT % 33   MONOS PCT % 6   EOS PCT % 1     Results from last 7 days   Lab Units 09/25/23  0445 09/24/23  1545   SODIUM mmol/L 142 145   POTASSIUM mmol/L 3.5 3.4*   CHLORIDE mmol/L 108 111*   CO2 mmol/L 26 28   BUN mg/dL 11 5   CREATININE mg/dL 0.66 0.60   ANION GAP mmol/L 8 6   CALCIUM mg/dL 9.4 8.4   ALBUMIN g/dL  --  3.8   TOTAL BILIRUBIN mg/dL  --  0.81   ALK PHOS U/L  --  71   ALT U/L  --  23   AST U/L  --  17   GLUCOSE RANDOM mg/dL 91 98     Results from last 7 days   Lab Units 09/24/23  1453   INR  0.90             Results from last 7 days   Lab Units 09/19/23  1635   LACTIC ACID mmol/L 0.9       Lines/Drains:  Invasive Devices     Peripheral Intravenous Line  Duration           Peripheral IV 09/24/23 Right Antecubital <1 day                      Imaging: Reviewed radiology reports from this admission including: chest xray and ultrasound(s)    Recent Cultures (last 7 days):         Last 24 Hours Medication List:   Current Facility-Administered Medications   Medication Dose Route Frequency Provider Last Rate   • atorvastatin  40 mg Oral Daily Casandra Esquivel MD     • lactated ringers  75 mL/hr Intravenous Continuous Verito Sharp PA-C 75 mL/hr (09/25/23 1114)   • pantoprazole  40 mg Intravenous Q24H [facility] Casandra Esquivel MD     • potassium chloride  40 mEq Oral Once Sera Platt PA-C     • sucralfate  1 g Oral BID AC Casandra Esquivel MD          Today, Patient Was Seen By: Michelle Powell PA-C    **Please Note: This note may have been constructed using a voice recognition system. **

## 2023-09-25 NOTE — DISCHARGE SUMMARY
[address]  Discharge- Jorgito Jurado 1960, 57 y.o. female MRN: [id number]  Unit/Bed#: -Phillip Encounter: [id number]  Primary Care Provider: Adriel Hassan MD   Date and time admitted to hospital: 9/24/2023  1:53 PM    Hypokalemia-resolved as of 9/25/2023  Assessment & Plan  · K: 3.4 on admission; 3.5 today  · Recieved additional 40 meq supplement   · Follow up outpatient, suspect from poor oral intake in setting of abdominal pain     * Abdominal pain  Assessment & Plan  Presenting with ongoing abdominal pain also + chest pain. In the ED on 9/19 w/ melena with increasing bilateral abdominal pain and nausea. CTAP was negative for acute abdominal or pelvic pathology - discharged home. outpatient RUQ U/S 9/21 showed prominent CBD measuring up to 8 mm but no choledocholithiasis. Normal gallbladder. Presenting w/ similar pain but now chest pain with radiation to the back. She reported chest pressure going through to the back. It comes and goes. Reported it is gone after she got sucralfate. Same with abdominal pain. · JOAQUÍN score 0  · CXR shows no active pulmonary disease  · Trops negative  · EKG shows sinus bradycardia with a rate of 57 bpm chronic T wave inversions in leads V2 to V3 no other ST-T changes.   · GI consulted  · EGD today unremarkable except for irregular Z-line, will follow up outpatient   · Consideration for MRCP outpatient, however given resolution of symptoms with carafate, will trial short course of same   · Outpatient colonoscopy   · Continue pantoprazole daily   · Continue Sucralfate twice a day     Hyperlipidemia  Assessment & Plan  · Continue lipitor      Primary hypertension  Assessment & Plan  · Reported stopped hctz-losartan   · Reported BP is sometimes low at home   · Monitor off here, did well; defer restarting to outpatient provider     Abnormal carotid pulse  Assessment & Plan  · Carotid duplex is negative for aneurysmal dilation of carotid or subclavian  · Patient reports this has been present for months; CTA head/neck in June showed no abnormality  · On exam today 9/25 patient reports it is better; has never been painful, but has been "visible" at times   · Will refer back to PCP for follow up; consider additional vascular imaging? Medical Problems     Resolved Problems  Date Reviewed: 9/25/2023          Resolved    Chest pain 9/24/2023     Resolved by  Aryan Leo PA-C    Hypokalemia 9/25/2023     Resolved by  Aryan Leo PA-C        Discharging Physician / Practitioner: Aryan Leo PA-C  PCP: Charlee Sellers MD  Admission Date:   Admission Orders (From admission, onward)     Ordered        09/24/23 1744  Place in Observation  Once                      Discharge Date: 09/25/23    Consultations During Hospital Stay:  · IP CONSULT TO GASTROENTEROLOGY     Procedures Performed:   · EGD (9/25): Irregular Z-line The esophagus appeared normal. The stomach appeared normal. Performed random biopsy. The duodenum appeared normal    Significant Findings / Test Results:   · CXR (9/24): No active cardiopulmonary disease    · RUQ u/s (9/24):  CBD mildly prominent up to 8 mm, tapers normally; no choledocholithiasis; normal gallbladder     · Carotid duplex (9/25):   1. There is <50% stenosis noted in the RIGHT internal carotid artery. Plaque ishomogenous and smooth. Vertebral artery flow is antegrade. There is no significant subclavian artery disease. 2. There is <50% stenosis noted in the LEFT internal carotid artery. Plaque is homogenous and smooth. Vertebral artery flow is antegrade. There is no significant subclavian artery disease. 3. Compared to previous study on 3/14/2013, there is no significant interval change. Incidental Findings:   · As above      Test Results Pending at Discharge (will require follow up):    · Biopsy from EGD      Outpatient Tests Requested:  · None     Complications:  None     Reason for Admission: ACS rule out, abdominal pain     Hospital Course:   Jorgito Jurado is a 57 y.o. female patient who originally presented to the hospital on 9/24/2023 due to the above CC. Was monitor for ACS, ruled out. Seen by GI for abdominal pain and recommended EGD due to report of melena and (+) FIT outpatient. EGD was thankfully largely normal, though biopsies take due to irregular Z-line and will need follow up for the results outpatient. Patient with a "mass" of the right neck of unclear etiology; appears to be prominent pulse of either the more proximal carotid or brachiocephalic region. No noted aneurysm and given chronicity, per patient, with negative CTA imaging from June 2023, I have advised her to follow up outpatient. Area is non-tender and reportedly fluctuates in size and prominence. Please see above list of diagnoses and related plan for additional information. Condition at Discharge: good    Discharge Day Visit / Exam:   * Please refer to separate progress note for these details *    Discussion with Family: Patient declined call to . Discharge instructions/Information to patient and family:   See after visit summary for information provided to patient and family. Provisions for Follow-Up Care:  See after visit summary for information related to follow-up care and any pertinent home health orders. Disposition:   Home    Planned Readmission: no     Discharge Statement:  I spent 40 minutes discharging the patient. This time was spent on the day of discharge. I had direct contact with the patient on the day of discharge. Greater than 50% of the total time was spent examining patient, answering all patient questions, arranging and discussing plan of care with patient as well as directly providing post-discharge instructions. Additional time then spent on discharge activities. Discharge Medications:  See after visit summary for reconciled discharge medications provided to patient and/or family. **Please Note: This note may have been constructed using a voice recognition system**

## 2023-09-25 NOTE — ASSESSMENT & PLAN NOTE
· Carotid duplex is negative for aneurysmal dilation of carotid or subclavian  · Patient reports this has been present for months; CTA head/neck in June showed no abnormality  · On exam today 9/25 patient reports it is better; has never been painful, but has been "visible" at times   · Will refer back to PCP for follow up; consider additional vascular imaging?

## 2023-09-25 NOTE — ASSESSMENT & PLAN NOTE
· Reported stopped hctz-losartan   · Reported BP is sometimes low at home   · Monitor off here, did well; defer restarting to outpatient provider

## 2023-09-25 NOTE — ANESTHESIA POSTPROCEDURE EVALUATION
Post-Op Assessment Note    CV Status:  Stable  Pain Score: 0    Pain management: adequate     Mental Status:  Alert and awake   Hydration Status:  Euvolemic   PONV Controlled:  Controlled   Airway Patency:  Patent      Post Op Vitals Reviewed: Yes      Staff: CRNA         No notable events documented.     /55 (09/25/23 1524)    Temp      Pulse 68 (09/25/23 1524)   Resp 16 (09/25/23 1524)    SpO2 95 % (09/25/23 1524)

## 2023-09-25 NOTE — CONSULTS
Consultation - [facility]) [facility]  Sri Carney 57 y.o. female MRN: [id number]  Unit/Bed#: -01 Encounter: [id number]         Reason for Consult / Principal Problem: Epigastric burning, nausea and cough    HPI: Gladys Kendrick is a pleasant 33-year-old female with history of hyperlipidemia, hypertension and aortic sclerosis. Patient presented to the emergency room for epigastric pain with associated cough and vomiting. Patient also describes poor appetite and unintentional weight loss of a couple of pounds she reports. Patient reports that she has been taking her  for several doctors appointments and task, and was unsure whether or not her weight loss was related to stress. She was seen as an outpatient by our GI group, and was planned to have EGD and colonoscopy. Patient reports that she also had a single episode of melena when she submitted her fit test ordered by Dr. Carmencita Estrada. With regard to her work-up, she also had a right upper quadrant ultrasound showing no gallstones, but slightly dilated CBD at 8 mm. On admission, labs unremarkable. Her last CT scan was performed on September 19 revealing no acute abdominal pathology. X-ray performed admission unremarkable. Patient reports that her symptoms are controlled today. She feels that the IV PPI is helpful. With regard to NSAID use, the patient is on baby aspirin. Denies other NSAID use. She denies family history of GI malignancy to her knowledge. Patient reports her last endoscopy was likely 10 years ago. Her last colonoscopy was Dr. Carmencita Estrada in 2021 normal colon up to the terminal ileum. She was planned to have another colonoscopy given positive fit test.    Review of Systems:    CONSTITUTIONAL: Denies any fever, chills, or rigors. Good appetite, and no recent weight loss. HEENT: No earache or tinnitus. Denies hearing loss or visual disturbances. CARDIOVASCULAR: No chest pain or palpitations.    RESPIRATORY: Denies any cough, hemoptysis, shortness of breath or dyspnea on exertion. GASTROINTESTINAL: As noted in the History of Present Illness. GENITOURINARY: No problems with urination. Denies any hematuria or dysuria. NEUROLOGIC: No dizziness or vertigo, denies headaches. MUSCULOSKELETAL: Denies any muscle or joint pain. SKIN: Denies skin rashes or itching. ENDOCRINE: Denies excessive thirst. Denies intolerance to heat or cold. PSYCHOSOCIAL: Denies depression or anxiety. Denies any recent memory loss. Historical Information   Past Medical History:   Diagnosis Date   • Anemia    • Aortic valve disease    • Breast cancer ([address])    • Cardiac murmur    • Hyperlipidemia      Past Surgical History:   Procedure Laterality Date   • BREAST SURGERY      lumpectomy   • COLONOSCOPY     • NC ESOPHAGOGASTRODUODENOSCOPY TRANSORAL DIAGNOSTIC N/A 2/19/2019    Procedure: ESOPHAGOGASTRODUODENOSCOPY (EGD); Surgeon: Aurora Jules MD;  Location: [facility]; Service: Gastroenterology     Social History   Social History     Substance and Sexual Activity   Alcohol Use Not Currently    Comment: social     Social History     Substance and Sexual Activity   Drug Use No     Social History     Tobacco Use   Smoking Status Never   Smokeless Tobacco Never     Family History   Problem Relation Age of Onset   • No Known Problems Mother    • Hypertension Father         Meds/Allergies     Current Facility-Administered Medications   Medication Dose Route Frequency   • atorvastatin (LIPITOR) tablet 40 mg  40 mg Oral Daily   • pantoprazole (PROTONIX) injection 40 mg  40 mg Intravenous Q24H ZAHRA   • sucralfate (CARAFATE) tablet 1 g  1 g Oral BID AC       Allergies   Allergen Reactions   • Lisinopril Cough         Objective     Blood pressure 144/80, pulse 56, temperature 98.5 °F (36.9 °C), temperature source Oral, resp. rate 16, height 5' 3" (1.6 m), weight 54 kg (119 lb 0.8 oz), SpO2 95 %.       Intake/Output Summary (Last 24 hours) at 9/25/2023 0923  Last data filed at 9/25/2023 0804  Gross per 24 hour   Intake 0 ml   Output --   Net 0 ml         PHYSICAL EXAM:      General Appearance:   Alert and oriented x 3. Cooperative, and in no respiratory distress   HEENT:   Normocephalic, atraumatic, anicteric. Neck:  Supple, symmetrical, trachea midline   Lungs:   Clear to auscultation bilaterally; no rales, rhonchi or wheezing; respirations unlabored    Heart[de-identified]   S1 and S2 normal; regular rate and rhythm; no murmur, rub, or gallop. Abdomen:   Soft, non-tender, non-distended; normal bowel sounds; no masses, no organomegaly    Genitalia:   Deferred    Rectal:   Deferred    Extremities:  No cyanosis, clubbing or edema    Pulses:  2+ and symmetric all extremities    Skin:  Skin color, texture, turgor normal, no rashes or lesions    Lymph nodes:  No palpable cervical or supraclavicular lymphadenopathy        Lab Results:   Results from last 7 days   Lab Units 09/25/23  0445   WBC Thousand/uL 5.21   HEMOGLOBIN g/dL 12.6   HEMATOCRIT % 38.5   PLATELETS Thousands/uL 237   NEUTROS PCT % 60   LYMPHS PCT % 33   MONOS PCT % 6   EOS PCT % 1     Results from last 7 days   Lab Units 09/25/23  0445 09/24/23  1545   POTASSIUM mmol/L 3.5 3.4*   CHLORIDE mmol/L 108 111*   CO2 mmol/L 26 28   BUN mg/dL 11 5   CREATININE mg/dL 0.66 0.60   CALCIUM mg/dL 9.4 8.4   ALK PHOS U/L  --  71   ALT U/L  --  23   AST U/L  --  17     Results from last 7 days   Lab Units 09/24/23  1453   INR  0.90     Results from last 7 days   Lab Units 09/24/23  1545   LIPASE u/L 16       Imaging Studies:  XR chest 2 views    Result Date: 9/25/2023  Impression: No active pulmonary disease. Workstation performed: [id number]       ASSESSMENT and PLAN:      1) Epigastric burning, vomiting, single episode of melena - Patient on baby aspirin. She reports that she stopped baby aspirin on her own, and her melena resolved the following day.   Fortunately, troponins are within normal limits as well as her hemoglobin and LFTs.  Her symptoms could be secondary to peptic ulcer disease, H. pylori, etc.  - We will plan for EGD today to investigate after discussion with Dr. Sheron Thompson  - Patient will need a outpatient colonoscopy for positive FIT test as recommended by Dr. Coleman Pitts  - Continue PPI twice daily  - If EGD within normal limits, recommend MRCP    The patient was seen and examined by Dr. Sheron Thompson, all key medical decisions were made with Dr. Sheron Thompson. Thank you for allowing us to participate in the care of this pleasant patient. We will follow up with you closely. Portions of the record may have been created with voice recognition software. Occasional wrong word or "sound a like" substitutions may have occurred due to the inherent limitations of voice recognition software. Read the chart carefully and recognize, using context, where substitutions have occurred.

## 2023-09-25 NOTE — UTILIZATION REVIEW
Initial Clinical Review    Admission: Date/Time/Statement:   Admission Orders (From admission, onward)     Ordered        09/24/23 1744  Place in Observation  Once                      Orders Placed This Encounter   Procedures   • Place in Observation     Standing Status:   Standing     Number of Occurrences:   1     Order Specific Question:   Level of Care     Answer:   Med Surg [16]     ED Arrival Information     Expected   -    Arrival   9/24/2023 13:48    Acuity   Urgent            Means of arrival   Walk-In    Escorted by   Spouse    Service   Hospitalist    Admission type   Emergency            Arrival complaint   CHEST PAIN           Chief Complaint   Patient presents with   • Chest Pain     Pt c/o chest pain that radiates into her back x 2 days, pt also c/o a cough        Initial Presentation: 57 y.o. female to ED from home w/ several weeks of abd pain . Seen in ED last week w/ abd pain and had +  guaiac stool. CP today w/ radiation to back which resolved completely after she received sucralfate and pantoprazole. PMHX HLD , HTN . Admitted OBS status w/ abd pain plan for GI consult , NPO after MN for EGD . HTN she stopped HCTZ- losartan , monitor off meds . K 3.4 replete . HLD lipitor . 9/25 GI Consult   Epigastric burning , vomiting single episode of melena . On baby asa. Sx could be sec to peptic ulcer disease , H pylori . Plan for EGD today . Will need OP Colonoscopy for + FIT test . Cont PPI . If EGD wnl , rec MRCP .     9/25 IM Note   Plan for EGD today , cont PPI , sulfacalfate . Possible DC following EGD .          ED Triage Vitals   Temperature Pulse Respirations Blood Pressure SpO2   09/24/23 1355 09/24/23 1355 09/24/23 1355 09/24/23 1355 09/24/23 1355   98.1 °F (36.7 °C) 70 16 (!) 181/81 97 %      Temp Source Heart Rate Source Patient Position - Orthostatic VS BP Location FiO2 (%)   09/24/23 1355 09/24/23 1355 09/24/23 1355 09/24/23 1355 --   Oral Monitor Lying Right arm       Pain Score 09/24/23 1930       No Pain          Wt Readings from Last 1 Encounters:   09/24/23 54 kg (119 lb 0.8 oz)     Additional Vital Signs:   09/24/23 19:16:46 99.6 °F (37.6 °C) 60 15 146/80 102 96 % -- --   09/24/23 1730 -- 53 Abnormal  20 137/62 89 97 % -- --   09/24/23 1600 -- 52 Abnormal  19 155/71 102 98 % -- --   09/24/23 1530 -- 53 Abnormal  18 148/68 98 98 % -- --   09/24/23 1400 -- 56 16 156/74 109 99 % --        Pertinent Labs/Diagnostic Test Results:   9/24 EKG SB   XR chest 2 views    (Results Pending)   VAS carotid complete study    (Results Pending)     Results from last 7 days   Lab Units 09/24/23  1453   SARS-COV-2  Negative     Results from last 7 days   Lab Units 09/25/23  0445 09/24/23  1453 09/19/23  1635   WBC Thousand/uL 5.21 4.77 4.41   HEMOGLOBIN g/dL 12.6 14.0 14.3   HEMATOCRIT % 38.5 42.9 43.9   PLATELETS Thousands/uL 237 271 237   NEUTROS ABS Thousands/µL 3.06 3.24 3.21         Results from last 7 days   Lab Units 09/25/23 0445 09/24/23  1545 09/19/23  1635   SODIUM mmol/L 142 145 144   POTASSIUM mmol/L 3.5 3.4* 3.5   CHLORIDE mmol/L 108 111* 107   CO2 mmol/L 26 28 27   ANION GAP mmol/L 8 6 10   BUN mg/dL 11 5 8   CREATININE mg/dL 0.66 0.60 0.57*   EGFR ml/min/1.73sq m 93 96 98   CALCIUM mg/dL 9.4 8.4 9.2   MAGNESIUM mg/dL 2.3  --   --      Results from last 7 days   Lab Units 09/24/23  1545 09/19/23  1635   AST U/L 17 26   ALT U/L 23 36   ALK PHOS U/L 71 85   TOTAL PROTEIN g/dL 5.9* 6.8   ALBUMIN g/dL 3.8 4.5   TOTAL BILIRUBIN mg/dL 0.81 0.73     Results from last 7 days   Lab Units 09/25/23 0445 09/24/23  1545 09/19/23  1635   GLUCOSE RANDOM mg/dL 91 98 101     Results from last 7 days   Lab Units 09/24/23  1957 09/24/23  1545 09/24/23  1453 09/19/23  1635   HS TNI 0HR ng/L  --   --  2 3   HS TNI 2HR ng/L  --  3  --   --    HSTNI D2 ng/L  --  1  --   --    HS TNI 4HR ng/L 4  --   --   --    HSTNI D4 ng/L 2  --   --   --      Results from last 7 days   Lab Units 09/24/23  1453   PROTIME seconds 12.8   INR  0.90   PTT seconds 23     Results from last 7 days   Lab Units 09/19/23  1635   LACTIC ACID mmol/L 0.9     Results from last 7 days   Lab Units 09/24/23  1545 09/19/23  1635   LIPASE u/L 16 10*     Results from last 7 days   Lab Units 09/19/23  1630   CLARITY UA  Clear   COLOR UA  Colorless   SPEC GRAV UA  1.008   PH UA  6.5   GLUCOSE UA mg/dl Negative   KETONES UA mg/dl Negative   BLOOD UA  Negative   PROTEIN UA mg/dl Negative   NITRITE UA  Negative   BILIRUBIN UA  Negative   UROBILINOGEN UA (BE) mg/dl <2.0   LEUKOCYTES UA  Negative     Results from last 7 days   Lab Units 09/24/23  1453   INFLUENZA A PCR  Negative   INFLUENZA B PCR  Negative   RSV PCR  Negative       ED Treatment:   Medication Administration from 09/24/2023 1348 to 09/24/2023 1902       Date/Time Order Dose Route Action     09/24/2023 1505 EDT benzonatate (TESSALON PERLES) capsule 100 mg 100 mg Oral Given     09/24/2023 1506 EDT pantoprazole (PROTONIX) injection 40 mg 40 mg Intravenous Given     09/24/2023 1506 EDT sucralfate (CARAFATE) tablet 1 g 1 g Oral Given        Past Medical History:   Diagnosis Date   • Anemia    • Aortic valve disease    • Breast cancer ([address])    • Cardiac murmur    • Hyperlipidemia      Present on Admission:  • Hyperlipidemia  • Primary hypertension      Admitting Diagnosis: Reflux esophagitis [K21.00]  Melena [K92.1]  Chest pain [R07.9]  Age/Sex: 57 y.o. female  Admission Orders:  Scheduled Medications:  atorvastatin, 40 mg, Oral, Daily  pantoprazole, 40 mg, Intravenous, Q24H [address]  sucralfate, 1 g, Oral, BID AC      Continuous IV Infusions:     PRN Meds:     NPO   Up and OOB     IP CONSULT TO GASTROENTEROLOGY    Network Utilization Review Department  ATTENTION: Please call with any questions or concerns to [phone] and carefully listen to the prompts so that you are directed to the right person.  All voicemails are confidential.  Jarrett Console all requests for admission clinical reviews, approved or denied determinations and any other requests to dedicated fax number below belonging to the campus where the patient is receiving treatment.  List of dedicated fax numbers for the Facilities:  [place] DENIALS (Administrative/Medical Necessity) [phone] [address] (Maternity/NICU/Pediatrics) [phone]   [address] [place] [address] [phone]   [facility] [address] [facility] [phone]   [address] [place] [address] [address] [place] [address] [phone]   [address] [address] [facility] [phone]

## 2023-09-25 NOTE — ASSESSMENT & PLAN NOTE
Presenting today with ongoing abdominal pain also + chest pain. In the ED on 9/19 w/ melena with increasing bilateral abdominal pain and nausea. CTAP was negative for acute abdominal or pelvic pathology - discharged home. outpatient RUQ U/S 9/21 showed prominent CBD measuring up to 8 mm but no choledocholithiasis. Normal gallbladder. Presenting w/ similar pain but now chest pain with radiation to the back. She reported chest pressure going through to the back. It comes and goes. Reported it is gone after she got sucralfate. Same with abdominal pain. · JOAQUÍN score 0  · CXR shows no active pulmonary disease  · Trops negative  · EKG shows sinus bradycardia with a rate of 57 bpm chronic T wave inversions in leads V2 to V3 no other ST-T changes.   · GI consult  · EGD today  · If EGD is WNL then recommend MRCP  · Outpatient colonoscopy   · Continue pantoprazole twice a day  · Continue Sucralfate twice a day

## 2023-09-25 NOTE — ASSESSMENT & PLAN NOTE
Presenting with ongoing abdominal pain also + chest pain. In the ED on 9/19 w/ melena with increasing bilateral abdominal pain and nausea. CTAP was negative for acute abdominal or pelvic pathology - discharged home. outpatient RUQ U/S 9/21 showed prominent CBD measuring up to 8 mm but no choledocholithiasis. Normal gallbladder. Presenting w/ similar pain but now chest pain with radiation to the back. She reported chest pressure going through to the back. It comes and goes. Reported it is gone after she got sucralfate. Same with abdominal pain. · JOAQUÍN score 0  · CXR shows no active pulmonary disease  · Trops negative  · EKG shows sinus bradycardia with a rate of 57 bpm chronic T wave inversions in leads V2 to V3 no other ST-T changes.   · GI consulted  · EGD today unremarkable except for irregular Z-line, will follow up outpatient   · Consideration for MRCP outpatient, however given resolution of symptoms with carafate, will trial short course of same   · Outpatient colonoscopy   · Continue pantoprazole daily   · Continue Sucralfate twice a day

## 2023-09-25 NOTE — ANESTHESIA PREPROCEDURE EVALUATION
Procedure:  EGD  ]    Patient reports that her symptoms are controlled today. She feels that the IV PPI is helpful. With regard to NSAID use, the patient is on baby aspirin. Denies other NSAID use. She denies family history of GI malignancy to her knowledge.     Patient reports her last endoscopy was likely 10 years ago. Her last colonoscopy was Dr. Clementine Higgins in 2021 normal colon up to the terminal ileum. She was planned to have another colonoscopy given positive fit test.    Relevant Problems   CARDIO   (+) Hyperlipidemia   (+) Primary hypertension        Physical Exam    Airway    Mallampati score: III  TM Distance: >3 FB  Neck ROM: full     Dental       Cardiovascular  Cardiovascular exam normal    Pulmonary  Pulmonary exam normal     Other Findings        Anesthesia Plan  ASA Score- 3     Anesthesia Type- IV sedation with anesthesia with ASA Monitors. Additional Monitors:   Airway Plan:           Plan Factors-    Chart reviewed. EKG reviewed. Imaging results reviewed. Existing labs reviewed. Patient summary reviewed. Patient is not a current smoker. Induction- intravenous. Postoperative Plan-     Informed Consent- Anesthetic plan and risks discussed with patient. I personally reviewed this patient with the CRNA. Discussed and agreed on the Anesthesia Plan with the CRNA. .         * Abdominal pain  Assessment & Plan  -Presenting today with ongoing abdominal pain and also reported chest pain. Was recently In the ED on 9/19 with melena with increasing bilateral abdominal pain and nausea. At that time, CTAP was negative for acute abdominal or pelvic pathology. She was discharged home. She had an outpatient right upper quadrant ultrasound on 9/21 which showed prominent CBD measuring up to 8 mm but no choledocholithiasis. Normal gallbladder.  -Coming in with similar pain but now chest pain with radiation to the back. She reported chest pressure going through to the back.  It comes and goes. Reported it is gone after she got sucralfate. Same with abdominal pain. -JOAQUÍN score 0  -CXR appears normal on wet read, f/u final read   -Trop negative, repeat one more   - EKG shows sinus bradycardia with a rate of 57 bpm chronic T wave inversions in leads V2 to V3 no other ST-T changes.   -Given her symptoms ED has reached out to GI team who reportedly will plan for an EGD tomorrow  - will make her NPO after MN   - Gi consult placed      Primary hypertension  Assessment & Plan  Reported she stopped hctz-losartan (because of cough and inc urinary frequency) - does not want to be restarted on either of these medications   Says her BP is sometimes low at home   We can monitor off and if needed she is ok with starting back a small dose of norvasc      Hypokalemia  Assessment & Plan  Mild K 3.4  Giving 40 meq klor     Hyperlipidemia  Assessment & Plan  Continue lipitor

## 2023-09-26 ENCOUNTER — TRANSITIONAL CARE MANAGEMENT (OUTPATIENT)
Dept: FAMILY MEDICINE CLINIC | Facility: CLINIC | Age: 63
End: 2023-09-26

## 2023-09-26 ENCOUNTER — OFFICE VISIT (OUTPATIENT)
Age: 63
End: 2023-09-26
Payer: COMMERCIAL

## 2023-09-26 VITALS
BODY MASS INDEX: 21.26 KG/M2 | OXYGEN SATURATION: 97 % | DIASTOLIC BLOOD PRESSURE: 70 MMHG | WEIGHT: 120 LBS | SYSTOLIC BLOOD PRESSURE: 122 MMHG | HEIGHT: 63 IN | HEART RATE: 70 BPM

## 2023-09-26 DIAGNOSIS — K92.1 MELENA: ICD-10-CM

## 2023-09-26 DIAGNOSIS — R93.2 ABNORMAL ULTRASOUND OF BILIARY TRACT: ICD-10-CM

## 2023-09-26 DIAGNOSIS — R10.9 ABDOMINAL PAIN: ICD-10-CM

## 2023-09-26 DIAGNOSIS — R10.13 EPIGASTRIC PAIN: ICD-10-CM

## 2023-09-26 DIAGNOSIS — R19.5 OCCULT BLOOD IN STOOLS: Primary | ICD-10-CM

## 2023-09-26 DIAGNOSIS — K92.2 GI BLEED: ICD-10-CM

## 2023-09-26 PROCEDURE — 99213 OFFICE O/P EST LOW 20 MIN: CPT | Performed by: INTERNAL MEDICINE

## 2023-09-26 RX ORDER — FAMOTIDINE 20 MG/1
20 TABLET, FILM COATED ORAL 2 TIMES DAILY PRN
Qty: 60 TABLET | Refills: 0 | Status: SHIPPED | OUTPATIENT
Start: 2023-09-26

## 2023-09-26 RX ORDER — PANTOPRAZOLE SODIUM 20 MG/1
20 TABLET, DELAYED RELEASE ORAL DAILY
Qty: 31 TABLET | Refills: 0 | Status: SHIPPED | OUTPATIENT
Start: 2023-09-26 | End: 2023-10-27

## 2023-09-26 NOTE — PROGRESS NOTES
[facility]      Chief Complaint: Abdominal pain    HPI:  Sydell Cogan is a 57 y.o.  female who presents with recent visit to the hospital for abdominal pain and melena. EGD was negative. Patient did have a positive fit test ordered by me recently. She is on Protonix and Pepcid. She had an ultrasound which showed a dilated common bile duct. MRCP was recommended. She has no other new complaints. .      Review of Systems:   Constitutional: No fever or chills, feels well, no tiredness, no recent weight gain or weight loss. HENT: No complaints of earache, no hearing loss, no nosebleeds, no nasal discharge, no sore throat, no hoarseness. Eyes: No complaints of eye pain, no red eyes, no discharge from eyes, no itchy eyes. Cardiovascular: No complaints of slow heart rate, no fast heart rate, no chest pain, no palpitations, no leg claudication, no lower extremity edema. Respiratory: No complaints of shortness of breath, no wheezing, no cough, no SOB on exertion, no orthopnea. Gastrointestinal: As noted in HPI  Genitourinary: No complaints of dysuria, no incontinence, no hesitancy, no nocturia. Musculoskeletal: No complaints of arthralgia, no myalgias, no joint swelling or stiffness, no limb pain or swelling. Neurological: No complaints of headache, no confusion, no convulsions, no numbness or tingling, no dizziness or fainting, no limb weakness, no difficulty walking. Skin: No complaints of skin rash or skin lesions, no itching, no skin wound, no dry skin. Hematological/Lymphatic: No complaints of swollen glands, does not bleed easy. Allergic/Immunologic: No immunocompromised state. Endocrine:  No complaints of polyuria, no polydipsia. Psychiatric/Behavioral: is not suicidal, no sleep disturbances, no anxiety or depression, no change in personality, no emotional problems.        Historical Information   Past Medical History:   Diagnosis Date   • Anemia    • Aortic valve disease    • Breast cancer [facility])    • Cardiac murmur    • Hyperlipidemia      Past Surgical History:   Procedure Laterality Date   • BREAST SURGERY      lumpectomy   • COLONOSCOPY     • CT ESOPHAGOGASTRODUODENOSCOPY TRANSORAL DIAGNOSTIC N/A 2/19/2019    Procedure: ESOPHAGOGASTRODUODENOSCOPY (EGD); Surgeon: Analy Hernandez MD;  Location: [facility]; Service: Gastroenterology     Social History   Social History     Substance and Sexual Activity   Alcohol Use Not Currently    Comment: social     Social History     Substance and Sexual Activity   Drug Use No     Social History     Tobacco Use   Smoking Status Never   Smokeless Tobacco Never     Family History   Problem Relation Age of Onset   • No Known Problems Mother    • Hypertension Father          Current Medications: has a current medication list which includes the following prescription(s): aspirin, atorvastatin, vitamin d3, famotidine, losartan-hydrochlorothiazide, fish oil, ondansetron, pantoprazole, and sucralfate. Vital Signs: /70   Pulse 70   Ht 5' 3" (1.6 m)   Wt 54.4 kg (120 lb)   SpO2 97%   BMI 21.26 kg/m²       Physical Exam:   Constitutional  General Appearance: No acute distress, well appearing and well nourished  Head  Normocephalic  Eyes  Conjunctivae and lids: No swelling, erythema, or discharge. Pupils and irises: Equal, round and reactive to light. Ears, Nose, Mouth, and Throat  External inspection of ears and nose: Normal  Nasal mucosa, septum and turbinates: Normal without edema or erythema/   Oropharynx: Normal with no erythema, edema, exudate or lesions. Neck  Normal range of motion. Neck supple. Cardiovascular  Auscultation of the heart: Normal rate and rhythm, normal S1 and S2 without murmurs. Examination of the extremities for edema and/or varicosities: Normal  Pulmonary/Chest  Respiratory effort: No increased work of breathing or signs of respiratory distress.    Auscultation of lungs: Clear to auscultation, equal breath sounds bilaterally, no wheezes, rales, no rhonchi. Abdomen  Abdomen: Non-tender, no masses. Liver and spleen: No hepatomegaly or splenomegaly. Musculoskeletal  Gait and station: normal.  Digits and Nails: normal without clubbing or cyanosis. Inspection/palpation of joints, bones, and muscles: Normal  Neurological  No nystagmus or asterixis. Skin  Skin and subcutaneous tissue: Normal without rashes or lesions. Lymphatic  Palpation of the lymph nodes in neck: No lymphadenopathy. Psychiatric  Orientation to person, place and time: Normal.  Mood and affect: Normal.         Labs:  Lab Results   Component Value Date    ALT 23 09/24/2023    AST 17 09/24/2023    BUN 11 09/25/2023    CALCIUM 9.4 09/25/2023     09/25/2023    CHOL 216 (H) 03/22/2016    CO2 26 09/25/2023    CREATININE 0.66 09/25/2023    HDL 69 08/02/2023    HCT 38.5 09/25/2023    HGB 12.6 09/25/2023    HGBA1C 5.5 06/17/2022    MG 2.3 09/25/2023     09/25/2023    K 3.5 09/25/2023     03/22/2016    TRIG 74 08/02/2023    WBC 5.21 09/25/2023         X-Rays & Procedures:   MRI abdomen wo contrast and mrcp    (Results Pending)           ______________________________________________________________________      Assessment & Plan:     Diagnoses and all orders for this visit:    Occult blood in stools    Epigastric pain    Abnormal ultrasound of biliary tract  -     MRI abdomen wo contrast and mrcp; Future    GI bleed  -     pantoprazole (PROTONIX) 20 mg tablet; Take 1 tablet (20 mg total) by mouth daily  -     famotidine (PEPCID) 20 mg tablet; Take 1 tablet (20 mg total) by mouth 2 (two) times a day as needed for heartburn or indigestion    Melena  -     pantoprazole (PROTONIX) 20 mg tablet; Take 1 tablet (20 mg total) by mouth daily  -     famotidine (PEPCID) 20 mg tablet;  Take 1 tablet (20 mg total) by mouth 2 (two) times a day as needed for heartburn or indigestion    Abdominal pain  -     pantoprazole (PROTONIX) 20 mg tablet; Take 1 tablet (20 mg total) by mouth daily  -     famotidine (PEPCID) 20 mg tablet; Take 1 tablet (20 mg total) by mouth 2 (two) times a day as needed for heartburn or indigestion      Patient will undergo her colonoscopy as planned. We will perform an MRCP for the dilated bile duct. She will continue Pepcid and Protonix.   Further recommendations will depend on study results

## 2023-09-26 NOTE — TELEPHONE ENCOUNTER
Called Debra remy for patient to call the office and ask for me, Edwin Agarwal, at Dr. Alen Nicholas office. When patient calls, please call the [facility] office. Thank you.

## 2023-09-26 NOTE — TELEPHONE ENCOUNTER
Patient called back. .I called Chula Rojo stating that she did not need to call the office back. Patient scheduled an office visit for today. That is why I had called to get her in office today. No need to speak with Jose Archer. Will see her this afternoon.

## 2023-09-27 PROCEDURE — 88305 TISSUE EXAM BY PATHOLOGIST: CPT | Performed by: PATHOLOGY

## 2023-09-29 ENCOUNTER — OFFICE VISIT (OUTPATIENT)
Dept: FAMILY MEDICINE CLINIC | Facility: CLINIC | Age: 63
End: 2023-09-29
Payer: COMMERCIAL

## 2023-09-29 VITALS
OXYGEN SATURATION: 96 % | HEART RATE: 59 BPM | HEIGHT: 63 IN | SYSTOLIC BLOOD PRESSURE: 126 MMHG | WEIGHT: 120 LBS | DIASTOLIC BLOOD PRESSURE: 76 MMHG | BODY MASS INDEX: 21.26 KG/M2 | TEMPERATURE: 98.8 F

## 2023-09-29 DIAGNOSIS — K92.1 MELENA: ICD-10-CM

## 2023-09-29 DIAGNOSIS — I10 PRIMARY HYPERTENSION: ICD-10-CM

## 2023-09-29 DIAGNOSIS — R09.89 ABNORMAL CAROTID PULSE: ICD-10-CM

## 2023-09-29 DIAGNOSIS — Z09 HOSPITAL DISCHARGE FOLLOW-UP: Primary | ICD-10-CM

## 2023-09-29 DIAGNOSIS — E87.6 HYPOKALEMIA: ICD-10-CM

## 2023-09-29 DIAGNOSIS — R10.13 EPIGASTRIC PAIN: ICD-10-CM

## 2023-09-29 PROCEDURE — 99496 TRANSJ CARE MGMT HIGH F2F 7D: CPT | Performed by: STUDENT IN AN ORGANIZED HEALTH CARE EDUCATION/TRAINING PROGRAM

## 2023-09-29 NOTE — PROGRESS NOTES
Assessment & Plan     1. Hospital discharge follow-up    2. Epigastric pain    3. Melena    4. Hypokalemia    5. Abnormal carotid pulse        Patient will undergo her colonoscopy and MRCP for the dilated bile duct    Follow up after melena workup, will do CTA carotids. Unclear what was seen in hospital, negative US      Subjective     Transitional Care Management Review:   Ed Alfaro is a 57 y.o. female here for TCM follow up. During the TCM phone call patient stated:  TCM Call     Date and time call was made  9/26/2023  1:40 PM    Hospital care reviewed  Records reviewed    Date of Admission  09/24/23    Date of discharge  09/25/23    Diagnosis  Abdominal pain    Disposition  Home    Were the patients medications reviewed and updated  Yes    Current Symptoms  None      TCM Call     Post hospital issues  None    Should patient be enrolled in anticoag monitoring? No    Scheduled for follow up? Yes    Did you obtain your prescribed medications  Yes    Do you need help managing your prescriptions or medications  No    Is transportation to your appointment needed  No    I have advised the patient to call PCP with any new or worsening symptoms  [facility] or [facility] other    Support System  Family    The type of support provided  Emotional    Do you have social support  Yes, as much as I need    Are you recieving any outpatient services  No    Are you recieving home care services  No    Are you using any community resources  No    Current waiver services  No    Have you fallen in the last 12 months  No    Interperter language line needed  No    Counseling  Patient    Counseling topics  patient and family education; Activities of daily living    Comments  tcm scheduled for 9/29 at 10am        Hospitalized for melan. normal CT abd/pelvis, US showed CBD 8mm. MRCP to bve done outpatient. Symptoms resolved. Has hx of abnomral carotid pulse in neck, had a normal vascular US. States this  Has been occurring in the past but does flare up    Review of Systems   Constitutional: Negative for chills, fatigue and fever. HENT: Negative for rhinorrhea and sore throat. Eyes: Negative for visual disturbance. Respiratory: Negative for cough and shortness of breath. Cardiovascular: Negative for chest pain and palpitations. Gastrointestinal: Negative for abdominal pain, constipation, diarrhea, nausea and vomiting. Genitourinary: Negative for difficulty urinating, dysuria and frequency. Musculoskeletal: Negative for arthralgias and myalgias. Skin: Negative for color change and rash. Neurological: Negative for weakness and headaches. Objective     /76 (BP Location: Left arm, Patient Position: Sitting, Cuff Size: Standard)   Pulse 59   Temp 98.8 °F (37.1 °C)   Ht 5' 3" (1.6 m)   Wt 54.4 kg (120 lb)   SpO2 96%   BMI 21.26 kg/m²      Physical Exam  Constitutional:       General: She is not in acute distress. Appearance: Normal appearance. She is not ill-appearing. HENT:      Head: Normocephalic and atraumatic. Right Ear: Tympanic membrane, ear canal and external ear normal.      Left Ear: Tympanic membrane, ear canal and external ear normal.      Nose: Nose normal.      Mouth/Throat:      Mouth: Mucous membranes are moist.      Pharynx: Oropharynx is clear. No oropharyngeal exudate or posterior oropharyngeal erythema. Eyes:      General: No scleral icterus. Right eye: No discharge. Left eye: No discharge. Extraocular Movements: Extraocular movements intact. Conjunctiva/sclera: Conjunctivae normal.      Pupils: Pupils are equal, round, and reactive to light. Cardiovascular:      Rate and Rhythm: Normal rate and regular rhythm. Pulses: Normal pulses. Heart sounds: Normal heart sounds. No murmur heard. Pulmonary:      Effort: Pulmonary effort is normal. No respiratory distress. Breath sounds: Normal breath sounds. Abdominal:      General: Bowel sounds are normal.      Palpations: Abdomen is soft. Tenderness: There is no abdominal tenderness. Musculoskeletal:         General: Normal range of motion. Cervical back: Normal range of motion and neck supple. Lymphadenopathy:      Cervical: No cervical adenopathy. Skin:     General: Skin is warm and dry. Capillary Refill: Capillary refill takes less than 2 seconds. Neurological:      General: No focal deficit present. Mental Status: She is alert and oriented to person, place, and time. Mental status is at baseline. Cranial Nerves: No cranial nerve deficit.    Psychiatric:         Mood and Affect: Mood normal.       Medications have been reviewed by provider in current encounter    Raúl Karimi MD

## 2023-10-11 ENCOUNTER — TELEPHONE (OUTPATIENT)
Dept: FAMILY MEDICINE CLINIC | Facility: CLINIC | Age: 63
End: 2023-10-11

## 2023-10-16 ENCOUNTER — HOSPITAL ENCOUNTER (OUTPATIENT)
Dept: MRI IMAGING | Facility: HOSPITAL | Age: 63
Discharge: HOME | End: 2023-10-16
Attending: INTERNAL MEDICINE
Payer: COMMERCIAL

## 2023-10-16 DIAGNOSIS — R93.2 ABNORMAL ULTRASOUND OF BILIARY TRACT: ICD-10-CM

## 2023-10-16 PROCEDURE — G1004 CDSM NDSC: HCPCS

## 2023-10-16 PROCEDURE — 74181 MRI ABDOMEN W/O CONTRAST: CPT

## 2023-10-18 ENCOUNTER — TELEPHONE (OUTPATIENT)
Dept: FAMILY MEDICINE CLINIC | Facility: CLINIC | Age: 63
End: 2023-10-18

## 2023-10-18 DIAGNOSIS — R05.2 SUBACUTE COUGH: Primary | ICD-10-CM

## 2023-10-18 NOTE — TELEPHONE ENCOUNTER
Patient called regarding a cough she has had for a while - states she spoke to you about it at her most recent appt - she is requesting a referral to see a specialist for this. Please advise.

## 2023-10-19 ENCOUNTER — TELEPHONE (OUTPATIENT)
Age: 63
End: 2023-10-19

## 2023-10-23 ENCOUNTER — NURSE TRIAGE (OUTPATIENT)
Age: 63
End: 2023-10-23

## 2023-10-23 ENCOUNTER — OFFICE VISIT (OUTPATIENT)
Dept: CARDIOLOGY CLINIC | Facility: CLINIC | Age: 63
End: 2023-10-23
Payer: COMMERCIAL

## 2023-10-23 VITALS
OXYGEN SATURATION: 97 % | BODY MASS INDEX: 20.91 KG/M2 | DIASTOLIC BLOOD PRESSURE: 90 MMHG | HEIGHT: 63 IN | SYSTOLIC BLOOD PRESSURE: 150 MMHG | HEART RATE: 65 BPM | WEIGHT: 118 LBS

## 2023-10-23 DIAGNOSIS — F32.89 OTHER DEPRESSION: ICD-10-CM

## 2023-10-23 DIAGNOSIS — R07.9 CHEST PAIN, UNSPECIFIED TYPE: Primary | ICD-10-CM

## 2023-10-23 DIAGNOSIS — F41.9 ANXIETY: ICD-10-CM

## 2023-10-23 PROBLEM — F32.A DEPRESSION: Status: ACTIVE | Noted: 2023-10-23

## 2023-10-23 PROCEDURE — 99213 OFFICE O/P EST LOW 20 MIN: CPT | Performed by: INTERNAL MEDICINE

## 2023-10-23 NOTE — PROGRESS NOTES
Cardiology Follow Up    Dorsey Sacks  1960  [id number]  [facility] [address]  [place] [id number] [phone]    1. Chest pain, unspecified type  -     CT chest wo contrast; Future; Expected date: 10/23/2023  -     D-dimer, quantitative; Future    2. Anxiety    3. 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 Apparently her  has been quite sick as of late and she is very upset. She feels a constant pressure in the chest.  She has been anxious and depressed she says. She is not suicidal.    She went to the emergency room and cardiac studies were negative. She denies shortness of breath. EKG has shown some terminal T wave inversion in V2 and V3 which is unchanged. She has a coronary calcium score of 0 and previous negative stress testing. She also has a history of hypertension which has been fairly well controlled.     Patient Active Problem List   Diagnosis    Aortic valve disease    Hyperlipidemia    Primary hypertension    Left foot drop    Chest pain    Abdominal pain    Abnormal carotid pulse    Anxiety    Depression     Past Medical History:   Diagnosis Date    Anemia     Aortic valve disease     Breast cancer (HCC)     Cardiac murmur     Hyperlipidemia      Social History     Socioeconomic History    Marital status: /Civil Union     Spouse name: Not on file    Number of children: Not on file    Years of education: Not on file    Highest education level: Not on file   Occupational History    Not on file   Tobacco Use    Smoking status: Never    Smokeless tobacco: Never   Vaping Use    Vaping Use: Never used   Substance and Sexual Activity    Alcohol use: Not Currently     Comment: social    Drug use: No    Sexual activity: Yes   Other Topics Concern    Not on file   Social History Narrative    Not on file     Social Determinants of Health     Financial Resource Strain: Not on file   Food Insecurity: No Food Insecurity (9/25/2023)    Hunger Vital Sign     Worried About Running Out of Food in the Last Year: Never true     Ran Out of Food in the Last Year: Never true   Transportation Needs: No Transportation Needs (9/25/2023)    PRAPARE - Transportation     Lack of Transportation (Medical): No     Lack of Transportation (Non-Medical): No   Physical Activity: Not on file   Stress: Not on file   Social Connections: Not on file   Intimate Partner Violence: Not on file   Housing Stability: Low Risk  (9/25/2023)    Housing Stability Vital Sign     Unable to Pay for Housing in the Last Year: No     Number of Places Lived in the Last Year: 1     Unstable Housing in the Last Year: No      Family History   Problem Relation Age of Onset    No Known Problems Mother     Hypertension Father      Past Surgical History:   Procedure Laterality Date    BREAST SURGERY      lumpectomy    COLONOSCOPY      DC ESOPHAGOGASTRODUODENOSCOPY TRANSORAL DIAGNOSTIC N/A 2/19/2019    Procedure: ESOPHAGOGASTRODUODENOSCOPY (EGD); Surgeon: Jeff Arroyo MD;  Location: [facility];   Service: Gastroenterology       Current Outpatient Medications:     atorvastatin (LIPITOR) 40 mg tablet, TAKE 1 TABLET BY MOUTH EVERY DAY, Disp: 90 tablet, Rfl: 2    Cholecalciferol (VITAMIN D3) 2000 units capsule, Take 1 capsule by mouth daily, Disp: , Rfl:     famotidine (PEPCID) 20 mg tablet, Take 1 tablet (20 mg total) by mouth 2 (two) times a day as needed for heartburn or indigestion, Disp: 60 tablet, Rfl: 0    losartan-hydrochlorothiazide (HYZAAR) 50-12.5 mg per tablet, TAKE 1 TABLET BY MOUTH EVERY DAY, Disp: 90 tablet, Rfl: 1    Omega-3 Fatty Acids (FISH OIL) 1,000 mg, Take 1,000 mg by mouth daily, Disp: , Rfl:     ondansetron (ZOFRAN) 4 mg tablet, Take 1 tablet (4 mg total) by mouth every 6 (six) hours as needed for nausea or vomiting, Disp: 30 tablet, Rfl: 0    pantoprazole (PROTONIX) 20 mg tablet, Take 1 tablet (20 mg total) by mouth daily, Disp: 31 tablet, Rfl: 0    aspirin (Aspirin 81) 81 mg EC tablet, Take 1 tablet (81 mg total) by mouth daily (Patient not taking: Reported on 10/23/2023), Disp: 90 tablet, Rfl: 3    sucralfate (CARAFATE) 1 g tablet, Take 1 tablet (1 g total) by mouth 2 (two) times a day before meals for 14 days Do not start before September 26, 2023.  (Patient not taking: Reported on 10/23/2023), Disp: 28 tablet, Rfl: 0  Allergies   Allergen Reactions    Lisinopril Cough       Labs:  Admission on 09/24/2023, Discharged on 09/25/2023   Component Date Value    Ventricular Rate 09/24/2023 57     Atrial Rate 09/24/2023 57     IL Interval 09/24/2023 144     QRSD Interval 09/24/2023 92     QT Interval 09/24/2023 456     QTC Interval 09/24/2023 443     P Axis 09/24/2023 119     QRS Axis 09/24/2023 68     T Wave [place] 09/24/2023 53     hs TnI 0hr 09/24/2023 2     WBC 09/24/2023 4.77     RBC 09/24/2023 4.63     Hemoglobin 09/24/2023 14.0     Hematocrit 09/24/2023 42.9     MCV 09/24/2023 93     MCH 09/24/2023 30.2     MCHC 09/24/2023 32.6     RDW 09/24/2023 12.9     MPV 09/24/2023 9.9     Platelets 89/28/7266 271     nRBC 09/24/2023 0     Neutrophils Relative 09/24/2023 69     Immat GRANS % 09/24/2023 0     Lymphocytes Relative 09/24/2023 27     Monocytes Relative 09/24/2023 4     Eosinophils Relative 09/24/2023 0     Basophils Relative 09/24/2023 0     Neutrophils Absolute 09/24/2023 3.24     Immature Grans Absolute 09/24/2023 0.01     Lymphocytes Absolute 09/24/2023 1.28     Monocytes Absolute 09/24/2023 0.20     Eosinophils Absolute 09/24/2023 0.02     Basophils Absolute 09/24/2023 0.02     Sodium 09/24/2023 145     Potassium 09/24/2023 3.4 (L)     Chloride 09/24/2023 111 (H)     CO2 09/24/2023 28     ANION GAP 09/24/2023 6     BUN 09/24/2023 5     Creatinine 09/24/2023 0.60     Glucose 09/24/2023 98     Calcium 09/24/2023 8.4     AST 09/24/2023 17     ALT 09/24/2023 23     Alkaline Phosphatase 09/24/2023 71     Total Protein 09/24/2023 5.9 (L)     Albumin 09/24/2023 3.8     Total Bilirubin 09/24/2023 0.81     eGFR 09/24/2023 96     Lipase 09/24/2023 16     SARS-CoV-2 09/24/2023 Negative     INFLUENZA A PCR 09/24/2023 Negative     INFLUENZA B PCR 09/24/2023 Negative     RSV PCR 09/24/2023 Negative     STREP A PCR 09/24/2023 Not Detected     Protime 09/24/2023 12.8     INR 09/24/2023 0.90     PTT 09/24/2023 23     hs TnI 2hr 09/24/2023 3     Delta 2hr hsTnI 09/24/2023 1     hs TnI 4hr 09/24/2023 4     Delta 4hr hsTnI 09/24/2023 2     Sodium 09/25/2023 142     Potassium 09/25/2023 3.5     Chloride 09/25/2023 108     CO2 09/25/2023 26     ANION GAP 09/25/2023 8     BUN 09/25/2023 11     Creatinine 09/25/2023 0.66     Glucose 09/25/2023 91     Glucose, Fasting 09/25/2023 91     Calcium 09/25/2023 9.4     eGFR 09/25/2023 93     WBC 09/25/2023 5.21     RBC 09/25/2023 4.24     Hemoglobin 09/25/2023 12.6     Hematocrit 09/25/2023 38.5     MCV 09/25/2023 91     MCH 09/25/2023 29.7     MCHC 09/25/2023 32.7     RDW 09/25/2023 12.9     MPV 09/25/2023 9.1     Platelets 96/34/8151 237     nRBC 09/25/2023 0     Neutrophils Relative 09/25/2023 60     Immat GRANS % 09/25/2023 0     Lymphocytes Relative 09/25/2023 33     Monocytes Relative 09/25/2023 6     Eosinophils Relative 09/25/2023 1     Basophils Relative 09/25/2023 0     Neutrophils Absolute 09/25/2023 3.06     Immature Grans Absolute 09/25/2023 0.02     Lymphocytes Absolute 09/25/2023 1.73     Monocytes Absolute 09/25/2023 0.33     Eosinophils Absolute 09/25/2023 0.05     Basophils Absolute 09/25/2023 0.02     Magnesium 09/25/2023 2.3     Case Report 09/25/2023                      Value:Surgical Pathology Report                         Case: [id number]                                   Authorizing Provider:  Yulissa Castro MD              Collected:           09/25/2023 1520              Ordering Location:     [facility] Received:            09/25/2023 [address]                                     3rd Floor Med Surg Unit                                                      Pathologist:           Julissa Hillman MD                                                                 Specimen:    Stomach                                                                                    Final Diagnosis 09/25/2023                      Value: This result contains rich text formatting which cannot be displayed here. Additional Information 09/25/2023                      Value: This result contains rich text formatting which cannot be displayed here. Gross Description 09/25/2023                      Value: This result contains rich text formatting which cannot be displayed here.     Clinical Information 09/25/2023                      Value:Cold bx r/o H. pylori   Admission on 09/19/2023, Discharged on 09/19/2023   Component Date Value    Ventricular Rate 09/19/2023 74     Atrial Rate 09/19/2023 74     LA Interval 09/19/2023 146     QRSD Interval 09/19/2023 92     QT Interval 09/19/2023 482     QTC Interval 09/19/2023 535     P Axis 09/19/2023 76     QRS Axis 09/19/2023 65     T Wave [place] 09/19/2023 57     WBC 09/19/2023 4.41     RBC 09/19/2023 4.77     Hemoglobin 09/19/2023 14.3     Hematocrit 09/19/2023 43.9     MCV 09/19/2023 92     MCH 09/19/2023 30.0     MCHC 09/19/2023 32.6     RDW 09/19/2023 13.2     MPV 09/19/2023 9.0     Platelets 31/08/8496 237     nRBC 09/19/2023 0     Neutrophils Relative 09/19/2023 72     Immat GRANS % 09/19/2023 1     Lymphocytes Relative 09/19/2023 22     Monocytes Relative 09/19/2023 4     Eosinophils Relative 09/19/2023 0     Basophils Relative 09/19/2023 1     Neutrophils Absolute 09/19/2023 3.21     Immature Grans Absolute 09/19/2023 0.02     Lymphocytes Absolute 09/19/2023 0.97     Monocytes Absolute 09/19/2023 0.18     Eosinophils Absolute 09/19/2023 0.00     Basophils Absolute 09/19/2023 0.03     Sodium 09/19/2023 144     Potassium 09/19/2023 3.5     Chloride 09/19/2023 107     CO2 09/19/2023 27     ANION GAP 09/19/2023 10     BUN 09/19/2023 8     Creatinine 09/19/2023 0.57 (L)     Glucose 09/19/2023 101     Calcium 09/19/2023 9.2     AST 09/19/2023 26     ALT 09/19/2023 36     Alkaline Phosphatase 09/19/2023 85     Total Protein 09/19/2023 6.8     Albumin 09/19/2023 4.5     Total Bilirubin 09/19/2023 0.73     eGFR 09/19/2023 98     Lipase 09/19/2023 10 (L)     ABO Grouping 09/19/2023 A     Rh Factor 09/19/2023 Positive     Antibody Screen 09/19/2023 Negative     Specimen Expiration Date 09/19/2023 [id number]     Color, UA 09/19/2023 Colorless     Clarity, UA 09/19/2023 Clear     Specific Gravity, UA 09/19/2023 1.008     pH, UA 09/19/2023 6.5     Leukocytes, UA 09/19/2023 Negative     Nitrite, UA 09/19/2023 Negative     Protein, UA 09/19/2023 Negative     Glucose, UA 09/19/2023 Negative     Ketones, UA 09/19/2023 Negative     Urobilinogen, UA 09/19/2023 <2.0     Bilirubin, UA 09/19/2023 Negative     Occult Blood, UA 09/19/2023 Negative     LACTIC ACID 09/19/2023 0.9     hs TnI 0hr 09/19/2023 3    Appointment on 09/18/2023   Component Date Value    OCCULT BLD, FECAL IMMUNO* 09/18/2023 Positive (A)      Imaging: EGD    Result Date: 9/25/2023  Narrative: Table formatting from the original result was not included. [address] [place], [place] [id number] [phone] DATE OF SERVICE: 9/25/23 PHYSICIAN(S): Attending: Yessenia Ambriz MD Fellow: No Staff Documented INDICATION: Epigastric pain POST-OP DIAGNOSIS: See the impression below. PREPROCEDURE: Informed consent was obtained for the procedure, including sedation. Risks of perforation, hemorrhage, adverse drug reaction and aspiration were discussed. The patient was placed in the left lateral decubitus position.  Patient was explained about the risks and benefits of the procedure. Risks including but not limited to bleeding, infection, and perforation were explained in detail. Also explained about less than 100% sensitivity with the exam and other alternatives. PROCEDURE: EGD DETAILS OF PROCEDURE: Patient was taken to the procedure room where a time out was performed to confirm correct patient and correct procedure. The patient underwent monitored anesthesia care, which was administered by an anesthesia professional. The patient's blood pressure, heart rate, level of consciousness, respirations and oxygen were monitored throughout the procedure. The scope was advanced to the second part of the duodenum. Retroflexion was performed in the fundus. The patient experienced no blood loss. The procedure was not difficult. The patient tolerated the procedure well. There were no apparent adverse events. ANESTHESIA INFORMATION: ASA: III Anesthesia Type: IV Sedation with Anesthesia MEDICATIONS: No administrations occurring from 1511 to 1523 on 09/25/23 FINDINGS: Irregular Z-line 40 cm from the incisors The esophagus appeared normal. The stomach appeared normal. Performed random biopsy using biopsy forceps. The duodenum appeared normal. SPECIMENS: ID Type Source Tests Collected by Time Destination 1 :  Tissue Stomach TISSUE EXAM Isaias Ruano MD 9/25/2023  3:20 PM      Impression: Irregular Z-line The esophagus appeared normal. The stomach appeared normal. Performed random biopsy. The duodenum appeared normal. RECOMMENDATION:  Await pathology results  Outpatient follow up for MRCP as patient has borderline dilated CBD and colonoscopy as patient has hx of melena which is now resolved. Isaias Ruano MD     VAS carotid complete study    Result Date: 9/25/2023  Narrative:  [facility] REPORT CLINICAL: Indications: Patient presents with a cervical bruit and multiple cardiovascular risk factors. Patient is asymptomatic from a cerebral vascular standpoint.  Risk Factors The patient has history of Hyperlipidemia. She has no history of HTN. Clinical Right Pressure:  144/80 mm Hg, Left Pressure:  144/80 mm Hg. FINDINGS:  Right        Impression  PSV  EDV (cm/s)  Direction of Flow  Ratio  Dist. ICA                153          66                      3.13  Mid. ICA                  54          26                      1.10  Prox. ICA    1 - 49%      49          22                      1.00  Dist CCA                  49          19                            Mid CCA                   49          19                      0.80  Prox CCA                  61          16                      0.62  Ext Carotid               64          15                      1.30  Prox Vert                 37          16  Antegrade                 Subclavian               106          11                            Innominate                98          15                             Left         Impression  PSV  EDV (cm/s)  Direction of Flow  Ratio  Dist. ICA                 89          39                      1.33  Mid. ICA                  40          17                      0.59  Prox. ICA    1 - 49%      37          15                      0.56  Dist CCA                  60          24                            Mid CCA                   67          23                      0.97  Prox CCA                  69          20                            Ext Carotid               42          17                      0.63  Prox Vert                 46          18  Antegrade                 Subclavian                95           9                               CONCLUSION:  Impression  RIGHT: There is <50% stenosis noted in the internal carotid artery. Plaque is homogenous and smooth. Vertebral artery flow is antegrade. There is no significant subclavian artery disease. LEFT: There is <50% stenosis noted in the internal carotid artery. Plaque is homogenous and smooth. Vertebral artery flow is antegrade.  There is no significant subclavian artery disease. Compared to previous study on 3/14/2013, there is no significant interval change. SIGNATURE: Electronically Signed by: Yi Jose on 2023-09-25 12:02:24 PM    XR chest 2 views    Result Date: 9/25/2023  Narrative: CHEST INDICATION:   sob. COMPARISON: CT coronary calcium scoring 2/10/2023 EXAM PERFORMED/VIEWS:  XR CHEST PA & LATERAL FINDINGS: Surgical clips overlie the right breast. Cardiomediastinal silhouette appears unremarkable. The lungs are clear. No pneumothorax or pleural effusion. Osseous structures appear within normal limits for patient age. Impression: No active pulmonary disease. Workstation performed: [id number]       Review of Systems:  Review of Systems    Physical Exam:  She is tearful. Blood pressure 150/90 heart rate 65 and regular. Lungs clear. No carotid bruits. Regular rhythm. No murmurs or gallops. No chest wall tenderness. No edema. No calf tenderness. EKG shows nonspecific ST-T changes with terminal T wave inversion in V2 and V3 which has been seen previously    Discussion/Summary:    1. Anxiety and depression  2. Chest discomfort most likely noncardiac    Recommendations:    1. Check with primary care doctor  2. CT of the chest  3. D-dimer  4.   Return after testing    Linda Luis MD

## 2023-10-23 NOTE — TELEPHONE ENCOUNTER
Patient calling in, for MRI results, I advised they were still pending. Patient is upset they are taking so long to read and asking for Dr. Mayi Weber to read results. I informed her they need to be read by a radiologist before Dr. Mayi Weber can review impression. She asked for [facility] number to speak with radiology dept I provided her with hospital number.

## 2023-10-24 ENCOUNTER — TELEPHONE (OUTPATIENT)
Age: 63
End: 2023-10-24

## 2023-10-24 NOTE — TELEPHONE ENCOUNTER
Patients GI provider:  Dr. Satish Anguiano,     Number to return call: [phone]     Reason for call: Pt calling stated had no Instructions for colonoscopy tomorrow sent prep Instructions however pt has had food today procedure was re bennie for another day     Scheduled procedure/appointment date if applicable: Apt/procedure  12/5

## 2023-10-25 ENCOUNTER — HOSPITAL ENCOUNTER (EMERGENCY)
Facility: HOSPITAL | Age: 63
Discharge: HOME | End: 2023-10-25
Attending: EMERGENCY MEDICINE
Payer: COMMERCIAL

## 2023-10-25 ENCOUNTER — APPOINTMENT (EMERGENCY)
Dept: CT IMAGING | Facility: HOSPITAL | Age: 63
End: 2023-10-25
Payer: COMMERCIAL

## 2023-10-25 VITALS
RESPIRATION RATE: 16 BRPM | SYSTOLIC BLOOD PRESSURE: 155 MMHG | TEMPERATURE: 97.9 F | DIASTOLIC BLOOD PRESSURE: 69 MMHG | HEART RATE: 68 BPM | OXYGEN SATURATION: 97 %

## 2023-10-25 DIAGNOSIS — R53.1 WEAKNESS: Primary | ICD-10-CM

## 2023-10-25 LAB
2HR DELTA HS TROPONIN: 1 NG/L
ALBUMIN SERPL BCP-MCNC: 4.5 G/DL (ref 3.5–5)
ALP SERPL-CCNC: 78 U/L (ref 34–104)
ALT SERPL W P-5'-P-CCNC: 16 U/L (ref 7–52)
ANION GAP SERPL CALCULATED.3IONS-SCNC: 8 MMOL/L
AST SERPL W P-5'-P-CCNC: 15 U/L (ref 13–39)
ATRIAL RATE: 63 BPM
BASOPHILS # BLD AUTO: 0.02 THOUSANDS/ÂΜL (ref 0–0.1)
BASOPHILS NFR BLD AUTO: 1 % (ref 0–1)
BILIRUB SERPL-MCNC: 0.8 MG/DL (ref 0.2–1)
BILIRUB UR QL STRIP: NEGATIVE
BUN SERPL-MCNC: 7 MG/DL (ref 5–25)
CALCIUM SERPL-MCNC: 9.7 MG/DL (ref 8.4–10.2)
CARDIAC TROPONIN I PNL SERPL HS: 2 NG/L
CARDIAC TROPONIN I PNL SERPL HS: 3 NG/L
CHLORIDE SERPL-SCNC: 107 MMOL/L (ref 96–108)
CLARITY UR: CLEAR
CO2 SERPL-SCNC: 28 MMOL/L (ref 21–32)
COLOR UR: ABNORMAL
CREAT SERPL-MCNC: 0.64 MG/DL (ref 0.6–1.3)
EOSINOPHIL # BLD AUTO: 0.03 THOUSAND/ÂΜL (ref 0–0.61)
EOSINOPHIL NFR BLD AUTO: 1 % (ref 0–6)
ERYTHROCYTE [DISTWIDTH] IN BLOOD BY AUTOMATED COUNT: 13.3 % (ref 11.6–15.1)
FLUAV RNA RESP QL NAA+PROBE: NEGATIVE
FLUBV RNA RESP QL NAA+PROBE: NEGATIVE
GFR SERPL CREATININE-BSD FRML MDRD: 93 ML/MIN/1.73SQ M
GLUCOSE SERPL-MCNC: 104 MG/DL (ref 65–140)
GLUCOSE UR STRIP-MCNC: NEGATIVE MG/DL
HCT VFR BLD AUTO: 43.6 % (ref 34.8–46.1)
HGB BLD-MCNC: 14 G/DL (ref 11.5–15.4)
HGB UR QL STRIP.AUTO: NEGATIVE
IMM GRANULOCYTES # BLD AUTO: 0.01 THOUSAND/UL (ref 0–0.2)
IMM GRANULOCYTES NFR BLD AUTO: 0 % (ref 0–2)
KETONES UR STRIP-MCNC: ABNORMAL MG/DL
LEUKOCYTE ESTERASE UR QL STRIP: NEGATIVE
LIPASE SERPL-CCNC: 14 U/L (ref 11–82)
LYMPHOCYTES # BLD AUTO: 0.99 THOUSANDS/ÂΜL (ref 0.6–4.47)
LYMPHOCYTES NFR BLD AUTO: 23 % (ref 14–44)
MCH RBC QN AUTO: 30.2 PG (ref 26.8–34.3)
MCHC RBC AUTO-ENTMCNC: 32.1 G/DL (ref 31.4–37.4)
MCV RBC AUTO: 94 FL (ref 82–98)
MONOCYTES # BLD AUTO: 0.21 THOUSAND/ÂΜL (ref 0.17–1.22)
MONOCYTES NFR BLD AUTO: 5 % (ref 4–12)
NEUTROPHILS # BLD AUTO: 3.01 THOUSANDS/ÂΜL (ref 1.85–7.62)
NEUTS SEG NFR BLD AUTO: 70 % (ref 43–75)
NITRITE UR QL STRIP: NEGATIVE
NRBC BLD AUTO-RTO: 0 /100 WBCS
P AXIS: 60 DEGREES
PH UR STRIP.AUTO: 6.5 [PH]
PLATELET # BLD AUTO: 268 THOUSANDS/UL (ref 149–390)
PMV BLD AUTO: 8.8 FL (ref 8.9–12.7)
POTASSIUM SERPL-SCNC: 3.6 MMOL/L (ref 3.5–5.3)
PR INTERVAL: 144 MS
PROT SERPL-MCNC: 7.2 G/DL (ref 6.4–8.4)
PROT UR STRIP-MCNC: NEGATIVE MG/DL
QRS AXIS: 55 DEGREES
QRSD INTERVAL: 84 MS
QT INTERVAL: 398 MS
QTC INTERVAL: 407 MS
RBC # BLD AUTO: 4.64 MILLION/UL (ref 3.81–5.12)
RSV RNA RESP QL NAA+PROBE: NEGATIVE
SARS-COV-2 RNA RESP QL NAA+PROBE: NEGATIVE
SODIUM SERPL-SCNC: 143 MMOL/L (ref 135–147)
SP GR UR STRIP.AUTO: 1.01 (ref 1–1.03)
T WAVE AXIS: 14 DEGREES
UROBILINOGEN UR STRIP-ACNC: <2 MG/DL
VENTRICULAR RATE: 63 BPM
WBC # BLD AUTO: 4.27 THOUSAND/UL (ref 4.31–10.16)

## 2023-10-25 PROCEDURE — 36415 COLL VENOUS BLD VENIPUNCTURE: CPT

## 2023-10-25 PROCEDURE — 93005 ELECTROCARDIOGRAM TRACING: CPT

## 2023-10-25 PROCEDURE — 81003 URINALYSIS AUTO W/O SCOPE: CPT

## 2023-10-25 PROCEDURE — 99285 EMERGENCY DEPT VISIT HI MDM: CPT

## 2023-10-25 PROCEDURE — 84484 ASSAY OF TROPONIN QUANT: CPT

## 2023-10-25 PROCEDURE — 96360 HYDRATION IV INFUSION INIT: CPT

## 2023-10-25 PROCEDURE — 96361 HYDRATE IV INFUSION ADD-ON: CPT

## 2023-10-25 PROCEDURE — 83690 ASSAY OF LIPASE: CPT

## 2023-10-25 PROCEDURE — 0241U HB NFCT DS VIR RESP RNA 4 TRGT: CPT

## 2023-10-25 PROCEDURE — 93010 ELECTROCARDIOGRAM REPORT: CPT | Performed by: INTERNAL MEDICINE

## 2023-10-25 PROCEDURE — 80053 COMPREHEN METABOLIC PANEL: CPT

## 2023-10-25 PROCEDURE — 85025 COMPLETE CBC W/AUTO DIFF WBC: CPT

## 2023-10-25 RX ADMIN — SODIUM CHLORIDE 1000 ML: 0.9 INJECTION, SOLUTION INTRAVENOUS at 12:39

## 2023-10-25 NOTE — ED PROVIDER NOTES
History  Chief Complaint   Patient presents with    Weakness - Generalized     Weak x 1 wk, dizziness since after breakfast sometime, just got back from [place] 1 [place] ago,  is also sick      Patient is a 25-year-old female with a past medical history of anemia, aortic valve disease, breast cancer, hyperlipidemia and cardiac murmur presented to the emergency department for evaluation of generalized weakness. Patient states for the past week she has been feeling weak. Patient states today she had an episode of lightheadedness that had lasted longer than normal.  Patient states she has been getting intermittent lightheadedness over the past few days. Patient states she has noticed a dry mouth but states she has been eating and drinking. Patient states she was in [place] recently, got back on 10/14. Patient denies any recent sick contacts in contrast with triage note. Patient states she is having generalized abdominal pain which has been ongoing for the past few months that has been imaged previously outpatient. Denies fevers, chills, rash, headache, dizziness, visual changes, nausea, vomiting, diarrhea, constipation, chest pain, shortness of breath or difficulty breathing. Does not offer any other concerns or complaints. Prior to Admission Medications   Prescriptions Last Dose Informant Patient Reported? Taking?    Cholecalciferol (VITAMIN D3) 2000 units capsule  Self Yes No   Sig: Take 1 capsule by mouth daily   Omega-3 Fatty Acids (FISH OIL) 1,000 mg  Self Yes No   Sig: Take 1,000 mg by mouth daily   aspirin (Aspirin 81) 81 mg EC tablet  Self No No   Sig: Take 1 tablet (81 mg total) by mouth daily   Patient not taking: Reported on 10/23/2023   atorvastatin (LIPITOR) 40 mg tablet  Self No No   Sig: TAKE 1 TABLET BY MOUTH EVERY DAY   famotidine (PEPCID) 20 mg tablet   No No   Sig: Take 1 tablet (20 mg total) by mouth 2 (two) times a day as needed for heartburn or indigestion Subjective   Patient ID: Magali Copeland is a 23 year old female.    Chief Complaint   Patient presents with   • Leg Pain     on her right for past 2 months        HPI    Magali is a very pleasant healthy 23-year-old lady without past medical history, currently taking oral contraceptive pills who requested consult for moderate pain in her right tight, for the last 2 months.  She endorsed symptoms started after a component of activity, specifically after walking long distances in a commute to work, she denied trauma in the zone.  Pain is described as dull and sometimes sharp, intermittent, radiated to the tight,   6-7/10, associated with movements, she has tried Advil and Tylenol without significant relief.  She denied urinary symptoms or history of UTIs.  Pain is somehow reproducible with movements.  We will treat her pain conservatively with ibuprofen 600 mg every 8 hours and Flexeril (she also described the muslc has been tight), and pantoprazole for gastric protection.  As well we advised to perform cool warm therapy, muscle massage and stretch exercise.  If pain does not improve we will consider further imaging studies    Patient's medications, allergies, past medical, surgical, social and family histories were reviewed and updated as appropriate.    Review of Systems    No past medical history on file.  Past Surgical History:   Procedure Laterality Date   • Tonsillectomy       Current Outpatient Medications   Medication Sig Dispense Refill   • norethindrone-ethinyl estradiol (MICROGESTIN) 1-20 MG-MCG per tablet Take 1 tablet by mouth daily. Pt takes continuously for 4 months. 120 tablet 3   • ibuprofen (MOTRIN) 600 MG tablet Take 1 tablet by mouth every 8 hours as needed for Pain. 21 tablet 0   • cyclobenzaprine (FLEXERIL) 5 MG tablet Take 1 tablet by mouth 3 times daily as needed for Muscle spasms. 21 tablet 0   • pantoprazole (PROTONIX) 20 MG tablet Take 1 tablet by mouth daily for 7 days. 7 tablet 0     No current  facility-administered medications for this visit.      Family History   Problem Relation Age of Onset   • Postmenopausal breast cancer Mother         53   • Heart Father    • Dementia/Alzheimers Paternal Grandmother    • Cancer, Lung Paternal Grandfather      Social History     Tobacco Use   • Smoking status: Never Smoker   • Smokeless tobacco: Never Used   Substance Use Topics   • Alcohol use: Yes     Frequency: Monthly or less     Drinks per session: 1 or 2     Binge frequency: Never   • Drug use: Not Currently     Allergies   Allergen Reactions   • Amoxicillin RASH       Objective   There were no vitals filed for this visit.  Physical Exam    Assessment   Problem List Items Addressed This Visit     None      Visit Diagnoses     Muscle pain    -  Primary    Relevant Medications    ibuprofen (MOTRIN) 600 MG tablet    cyclobenzaprine (FLEXERIL) 5 MG tablet    pantoprazole (PROTONIX) 20 MG tablet          I spent 10 minutes over the phone in medical discussion    Discussed with Dr. Covington   losartan-hydrochlorothiazide (HYZAAR) 50-12.5 mg per tablet  Self No No   Sig: TAKE 1 TABLET BY MOUTH EVERY DAY   ondansetron (ZOFRAN) 4 mg tablet  Self No No   Sig: Take 1 tablet (4 mg total) by mouth every 6 (six) hours as needed for nausea or vomiting   pantoprazole (PROTONIX) 20 mg tablet   No No   Sig: Take 1 tablet (20 mg total) by mouth daily   sucralfate (CARAFATE) 1 g tablet  Self No No   Sig: Take 1 tablet (1 g total) by mouth 2 (two) times a day before meals for 14 days Do not start before September 26, 2023. Patient not taking: Reported on 10/23/2023      Facility-Administered Medications: None       Past Medical History:   Diagnosis Date    Anemia     Aortic valve disease     Breast cancer ([address])     Cardiac murmur     Hyperlipidemia        Past Surgical History:   Procedure Laterality Date    BREAST SURGERY      lumpectomy    COLONOSCOPY      UT ESOPHAGOGASTRODUODENOSCOPY TRANSORAL DIAGNOSTIC N/A 2/19/2019    Procedure: ESOPHAGOGASTRODUODENOSCOPY (EGD); Surgeon: Serena Min MD;  Location: [facility]; Service: Gastroenterology       Family History   Problem Relation Age of Onset    No Known Problems Mother     Hypertension Father      I have reviewed and agree with the history as documented. E-Cigarette/Vaping    E-Cigarette Use Never User      E-Cigarette/Vaping Substances    Nicotine No     THC No     CBD No     Flavoring No     Other No     Unknown No      Social History     Tobacco Use    Smoking status: Never    Smokeless tobacco: Never   Vaping Use    Vaping Use: Never used   Substance Use Topics    Alcohol use: Not Currently     Comment: social    Drug use: No       Review of Systems   Constitutional:  Negative for chills and fever. HENT:  Negative for ear pain and sore throat. Eyes:  Negative for pain and visual disturbance. Respiratory:  Negative for cough and shortness of breath. Cardiovascular:  Negative for chest pain and palpitations.    Gastrointestinal:  Positive for abdominal pain. Negative for constipation, diarrhea, nausea and vomiting. Genitourinary:  Negative for dysuria and hematuria. Musculoskeletal:  Negative for arthralgias and back pain. Skin:  Negative for color change and rash. Neurological:  Positive for weakness and light-headedness. Negative for dizziness, seizures, syncope and headaches. All other systems reviewed and are negative. Physical Exam  Physical Exam  Vitals and nursing note reviewed. Constitutional:       General: She is not in acute distress. Appearance: Normal appearance. She is well-developed. She is not toxic-appearing or diaphoretic. HENT:      Head: Normocephalic and atraumatic. Right Ear: External ear normal.      Left Ear: External ear normal.      Nose: Nose normal.      Mouth/Throat:      Mouth: Mucous membranes are moist.   Eyes:      General: No scleral icterus. Right eye: No discharge. Left eye: No discharge. Conjunctiva/sclera: Conjunctivae normal.   Cardiovascular:      Rate and Rhythm: Normal rate and regular rhythm. Pulmonary:      Effort: Pulmonary effort is normal. No respiratory distress. Breath sounds: Normal breath sounds. Abdominal:      Palpations: Abdomen is soft. Tenderness: There is no abdominal tenderness. Musculoskeletal:         General: No swelling, deformity or signs of injury. Normal range of motion. Cervical back: Normal range of motion and neck supple. No rigidity. Skin:     General: Skin is warm and dry. Capillary Refill: Capillary refill takes less than 2 seconds. Coloration: Skin is not jaundiced. Findings: No erythema or rash. Neurological:      General: No focal deficit present. Mental Status: She is alert and oriented to person, place, and time. Mental status is at baseline. Cranial Nerves: No cranial nerve deficit.       Gait: Gait normal.   Psychiatric:         Mood and Affect: Mood normal. Behavior: Behavior normal.         Thought Content: Thought content normal.         Judgment: Judgment normal.         Vital Signs  ED Triage Vitals [10/25/23 1155]   Temperature Pulse Respirations Blood Pressure SpO2   97.9 °F (36.6 °C) 63 16 163/83 99 %      Temp Source Heart Rate Source Patient Position - Orthostatic VS BP Location FiO2 (%)   Temporal Monitor Sitting Left arm --      Pain Score       --           Vitals:    10/25/23 1155 10/25/23 1240 10/25/23 1342   BP: 163/83 162/70 155/69   Pulse: 63 60 68   Patient Position - Orthostatic VS: Sitting           Visual Acuity      ED Medications  Medications   sodium chloride 0.9 % bolus 1,000 mL (1,000 mL Intravenous New Bag 10/25/23 1239)       Diagnostic Studies  Results Reviewed       Procedure Component Value Units Date/Time    HS Troponin I 4hr [[id number]]     Lab Status: No result Specimen: Blood     HS Troponin I 2hr [[id number]] Collected: 10/25/23 1427    Lab Status:  In process Specimen: Blood from Arm, Right Updated: 10/25/23 1431    UA w Reflex to Microscopic w Reflex to Culture [[id number]]  (Abnormal) Collected: 10/25/23 1413    Lab Status: Final result Specimen: Urine, Clean Catch Updated: 10/25/23 1429     Color, UA Light Yellow     Clarity, UA Clear     Specific Gravity, UA 1.009     pH, UA 6.5     Leukocytes, UA Negative     Nitrite, UA Negative     Protein, UA Negative mg/dl      Glucose, UA Negative mg/dl      Ketones, UA 10 (1+) mg/dl      Urobilinogen, UA <2.0 mg/dl      Bilirubin, UA Negative     Occult Blood, UA Negative    FLU/RSV/COVID - if FLU/RSV clinically relevant [[id number]]  (Normal) Collected: 10/25/23 1238    Lab Status: Final result Specimen: Nares from Nose Updated: 10/25/23 1324     SARS-CoV-2 Negative     INFLUENZA A PCR Negative     INFLUENZA B PCR Negative     RSV PCR Negative    Narrative:      FOR PEDIATRIC PATIENTS - copy/paste COVID Guidelines URL to browser: [email or web address]    SARS-CoV-2 assay is a Nucleic Acid Amplification assay intended for the  qualitative detection of nucleic acid from SARS-CoV-2 in nasopharyngeal  swabs. Results are for the presumptive identification of SARS-CoV-2 RNA. Positive results are indicative of infection with SARS-CoV-2, the virus  causing COVID-19, but do not rule out bacterial infection or co-infection  with other viruses. Laboratories within the [facility] and its  territories are required to report all positive results to the appropriate  public health authorities. Negative results do not preclude SARS-CoV-2  infection and should not be used as the sole basis for treatment or other  patient management decisions. Negative results must be combined with  clinical observations, patient history, and epidemiological information. This test has not been FDA cleared or approved. This test has been authorized by FDA under an Emergency Use Authorization  (EUA). This test is only authorized for the duration of time the  declaration that circumstances exist justifying the authorization of the  emergency use of an in vitro diagnostic tests for detection of SARS-CoV-2  virus and/or diagnosis of COVID-19 infection under section 564(b)(1) of  the Act, 21 U. S.C. 714LKY-9(B)(5), unless the authorization is terminated  or revoked sooner. The test has been validated but independent review by FDA  and CLIA is pending. Test performed using [employer] GeneXpert: This RT-PCR assay targets N2,  a region unique to SARS-CoV-2. A conserved region in the E-gene was chosen  for pan-Sarbecovirus detection which includes SARS-CoV-2. According to CMS-2020-01-R, this platform meets the definition of high-throughput technology.     HS Troponin 0hr (reflex protocol) [[id number]]  (Normal) Collected: 10/25/23 1238    Lab Status: Final result Specimen: Blood from Arm, Right Updated: 10/25/23 1319     hs TnI 0hr 2 ng/L     Comprehensive metabolic panel [[id number]] Collected: 10/25/23 1238    Lab Status: Final result Specimen: Blood from Arm, Right Updated: 10/25/23 1312     Sodium 143 mmol/L      Potassium 3.6 mmol/L      Chloride 107 mmol/L      CO2 28 mmol/L      ANION GAP 8 mmol/L      BUN 7 mg/dL      Creatinine 0.64 mg/dL      Glucose 104 mg/dL      Calcium 9.7 mg/dL      AST 15 U/L      ALT 16 U/L      Alkaline Phosphatase 78 U/L      Total Protein 7.2 g/dL      Albumin 4.5 g/dL      Total Bilirubin 0.80 mg/dL      eGFR 93 ml/min/1.73sq m     Narrative:      National Kidney Disease Foundation guidelines for Chronic Kidney Disease (CKD):     Stage 1 with normal or high GFR (GFR > 90 mL/min/1.73 square meters)    Stage 2 Mild CKD (GFR = 60-89 mL/min/1.73 square meters)    Stage 3A Moderate CKD (GFR = 45-59 mL/min/1.73 square meters)    Stage 3B Moderate CKD (GFR = 30-44 mL/min/1.73 square meters)    Stage 4 Severe CKD (GFR = 15-29 mL/min/1.73 square meters)    Stage 5 End Stage CKD (GFR <15 mL/min/1.73 square meters)  Note: GFR calculation is accurate only with a steady state creatinine    Lipase [[id number]]  (Normal) Collected: 10/25/23 1238    Lab Status: Final result Specimen: Blood from Arm, Right Updated: 10/25/23 1312     Lipase 14 u/L     CBC and differential [[id number]]  (Abnormal) Collected: 10/25/23 1238    Lab Status: Final result Specimen: Blood from Arm, Right Updated: 10/25/23 1248     WBC 4.27 Thousand/uL      RBC 4.64 Million/uL      Hemoglobin 14.0 g/dL      Hematocrit 43.6 %      MCV 94 fL      MCH 30.2 pg      MCHC 32.1 g/dL      RDW 13.3 %      MPV 8.8 fL      Platelets 244 Thousands/uL      nRBC 0 /100 WBCs      Neutrophils Relative 70 %      Immat GRANS % 0 %      Lymphocytes Relative 23 %      Monocytes Relative 5 %      Eosinophils Relative 1 %      Basophils Relative 1 %      Neutrophils Absolute 3.01 Thousands/µL      Immature Grans Absolute 0.01 Thousand/uL      Lymphocytes Absolute 0.99 Thousands/µL      Monocytes Absolute 0.21 Thousand/µL      Eosinophils Absolute 0.03 Thousand/µL      Basophils Absolute 0.02 Thousands/µL                    CT abdomen pelvis with contrast    (Results Pending)              Procedures  Procedures         ED Course  ED Course as of 10/25/23 1446   Wed Oct 25, 2023   1334 Declined CT, requesting her outpatient MRI be read. Reached out to the reading room for a read. 1423 MRI abdomen: IMPRESSION:     Biliary ducts are normal in caliber. No choledocholithiasis or evidence for obstructive cholangiopathy. Pseudo-distended appearance on prior ultrasound likely attributable to artifact from scanning angle and volume averaging, possibly at the confluence   of the hepatic ducts. No acute abdominal findings. 1443 Leukocytes, UA: Negative   1443 Nitrite, UA: Negative             HEART Risk Score      Flowsheet Row Most Recent Value   Heart Score Risk Calculator    History 1 Filed at: 10/25/2023 1445   ECG 1 Filed at: 10/25/2023 1445   Age 2 Filed at: 10/25/2023 1445   Risk Factors 1 Filed at: 10/25/2023 1445   Troponin 0 Filed at: 10/25/2023 1445   HEART Score 5 Filed at: 10/25/2023 1445                          SBIRT 22yo+      Flowsheet Row Most Recent Value   Initial Alcohol Screen: US AUDIT-C     1. How often do you have a drink containing alcohol? 0 Filed at: 10/25/2023 1158   2. How many drinks containing alcohol do you have on a typical day you are drinking? 0 Filed at: 10/25/2023 1158   3a. Male UNDER 65: How often do you have five or more drinks on one occasion? 0 Filed at: 10/25/2023 1158   3b. FEMALE Any Age, or MALE 65+: How often do you have 4 or more drinks on one occassion? 0 Filed at: 10/25/2023 1158   Audit-C Score 0 Filed at: 10/25/2023 1158   JESSE: How many times in the past year have you. .. Used an illegal drug or used a prescription medication for non-medical reasons?  Never Filed at: 10/25/2023 1158                      Medical Decision Making    This is a 57-year-old female present to the emergency department for evaluation of weakness. Patient states she has been feeling weak over the past week. Patient states this morning she had an episode of lightheadedness that had lasted longer than recent. Patient states she has been getting intermittent lightheadedness. Patient states she continues to eat and drink without difficulty. Patient states she has not had any sensation of the room spinning or that she is spinning. Patient states she recently returned from [place] but denies any recent sick contacts. Patient states she has had abdominal pain for the past few months that was previously worked up in September. Patient is in no acute distress on initial examination, stable vital signs. Differential diagnosis to include but is not limited to: ACS, STEMI, arrhythmia, dehydration, electrolyte abnormality, pancreatitis, gastritis    Initial ED Plan: imaging, labs, ua    ED results:  MRI results as above, no acute abnormality. 0 hour troponin:2  Heart score: 5    Final ED assessment: Patient is stable and well appearing. Discussed radiologic studies and laboratory results. Discussed follow-up with PCP. Discussed supportive care. Strict return precautions were discussed including but not limited to worsening weakness, pain, fevers, urinary symptoms, dizziness. Patient verbalized understanding and is agreeable with the plan for discharge. Amount and/or Complexity of Data Reviewed  Labs: ordered. Decision-making details documented in ED Course. Radiology: ordered.              Disposition  Final diagnoses:   Weakness     Time reflects when diagnosis was documented in both MDM as applicable and the Disposition within this note       Time User Action Codes Description Comment    10/25/2023  2:43 PM Amanda Sanchez Add [R53.1] Weakness           ED Disposition       ED Disposition   Discharge    Condition   Stable    Date/Time   Wed Oct 25, 1610 [facility] discharge to home/self care. Follow-up Information       Follow up With Specialties Details Why Contact Info Additional Information    Raúl Karimi MD Family Medicine Call in 3 days For follow up [address] Emergency Department Emergency Medicine Go to  If symptoms worsen [address] [facility] Emergency Department, [place], [place], [address]            Patient's Medications   Discharge Prescriptions    No medications on file       No discharge procedures on file.     PDMP Review       None            ED Provider  Electronically Signed by             Eunice Marsh PA-C  10/25/23 4318

## 2023-10-25 NOTE — DISCHARGE INSTRUCTIONS
Follow-up with PCP.   Supportive care  Return to the ED with new or worsening symptoms including but not limited to worsening weakness, dizziness, abdominal pain, fevers

## 2023-11-08 ENCOUNTER — OFFICE VISIT (OUTPATIENT)
Dept: FAMILY MEDICINE CLINIC | Facility: CLINIC | Age: 63
End: 2023-11-08
Payer: COMMERCIAL

## 2023-11-08 VITALS
TEMPERATURE: 97.7 F | SYSTOLIC BLOOD PRESSURE: 106 MMHG | WEIGHT: 116 LBS | HEIGHT: 63 IN | DIASTOLIC BLOOD PRESSURE: 76 MMHG | BODY MASS INDEX: 20.55 KG/M2

## 2023-11-08 DIAGNOSIS — I35.9 AORTIC VALVE DISEASE: ICD-10-CM

## 2023-11-08 DIAGNOSIS — I10 PRIMARY HYPERTENSION: ICD-10-CM

## 2023-11-08 DIAGNOSIS — E78.5 HYPERLIPIDEMIA, UNSPECIFIED HYPERLIPIDEMIA TYPE: ICD-10-CM

## 2023-11-08 DIAGNOSIS — Z00.00 MEDICARE ANNUAL WELLNESS VISIT, SUBSEQUENT: ICD-10-CM

## 2023-11-08 DIAGNOSIS — Z12.39 SCREENING BREAST EXAMINATION: ICD-10-CM

## 2023-11-08 DIAGNOSIS — R10.13 EPIGASTRIC PAIN: ICD-10-CM

## 2023-11-08 DIAGNOSIS — R63.0 POOR APPETITE: Primary | ICD-10-CM

## 2023-11-08 DIAGNOSIS — F41.9 ANXIETY: ICD-10-CM

## 2023-11-08 PROBLEM — R09.89 ABNORMAL CAROTID PULSE: Status: RESOLVED | Noted: 2023-09-25 | Resolved: 2023-11-08

## 2023-11-08 PROCEDURE — 99214 OFFICE O/P EST MOD 30 MIN: CPT | Performed by: STUDENT IN AN ORGANIZED HEALTH CARE EDUCATION/TRAINING PROGRAM

## 2023-11-08 PROCEDURE — G0444 DEPRESSION SCREEN ANNUAL: HCPCS | Performed by: STUDENT IN AN ORGANIZED HEALTH CARE EDUCATION/TRAINING PROGRAM

## 2023-11-08 PROCEDURE — G0439 PPPS, SUBSEQ VISIT: HCPCS | Performed by: STUDENT IN AN ORGANIZED HEALTH CARE EDUCATION/TRAINING PROGRAM

## 2023-11-08 RX ORDER — HYDROXYZINE HYDROCHLORIDE 10 MG/1
10 TABLET, FILM COATED ORAL EVERY 6 HOURS PRN
Qty: 30 TABLET | Refills: 1 | Status: SHIPPED | OUTPATIENT
Start: 2023-11-08

## 2023-11-08 RX ORDER — CITALOPRAM HYDROBROMIDE 10 MG/1
10 TABLET ORAL DAILY
Qty: 45 TABLET | Refills: 0 | Status: SHIPPED | OUTPATIENT
Start: 2023-11-08

## 2023-11-08 NOTE — PROGRESS NOTES
Assessment and Plan:     Problem List Items Addressed This Visit        Cardiovascular and Mediastinum    Aortic valve disease    Primary hypertension       Other    Hyperlipidemia    Abdominal pain     Colonoscopy scheduled          Anxiety    Relevant Medications    citalopram (CeleXA) 10 mg tablet    hydrOXYzine HCL (ATARAX) 10 mg tablet   Other Visit Diagnoses     Poor appetite    -  Primary    Medicare annual wellness visit, subsequent        Screening breast examination        Relevant Orders    Mammo screening bilateral w 3d & cad           Do believe the stress of taking care of her quite sick has been long involving her daily responsibilities is taking quite a toll. We will start SSRI and as needed hydroxyzine. Have close follow-up in 4 to 6 weeks. Preventive health issues were discussed with patient, and age appropriate screening tests were ordered as noted in patient's After Visit Summary. Personalized health advice and appropriate referrals for health education or preventive services given if needed, as noted in patient's After Visit Summary. History of Present Illness:     Patient presents for a Medicare Wellness Visit    HPI     Tired, nervous anxieties, cramping in arms bilaterally. Low appetite. Feels gas in epigastric\. Trouble falling asleep. Trouble focusing, finds her self spacing out. Tickle in throat, has pulm appointment    Patient Care Team:  Dominique Saravia MD as PCP - General (Family Medicine)  Junior Falkita MD     Review of Systems:     Review of Systems   Constitutional:  Negative for chills, fatigue and fever. HENT:  Negative for rhinorrhea and sore throat. Eyes:  Negative for visual disturbance. Respiratory:  Negative for cough and shortness of breath. Cardiovascular:  Negative for chest pain and palpitations. Gastrointestinal:  Negative for abdominal pain, constipation, diarrhea, nausea and vomiting.    Genitourinary:  Negative for difficulty urinating, dysuria and frequency. Musculoskeletal:  Negative for arthralgias and myalgias. Skin:  Negative for color change and rash. Neurological:  Negative for weakness and headaches. Psychiatric/Behavioral:  Positive for behavioral problems and sleep disturbance. The patient is nervous/anxious. Problem List:     Patient Active Problem List   Diagnosis   • Aortic valve disease   • Hyperlipidemia   • Primary hypertension   • Left foot drop   • Chest pain   • Abdominal pain   • Anxiety   • Depression      Past Medical and Surgical History:     Past Medical History:   Diagnosis Date   • Anemia    • Aortic valve disease    • Breast cancer ([address])    • Cardiac murmur    • Hyperlipidemia      Past Surgical History:   Procedure Laterality Date   • BREAST SURGERY      lumpectomy   • COLONOSCOPY     • MT ESOPHAGOGASTRODUODENOSCOPY TRANSORAL DIAGNOSTIC N/A 2/19/2019    Procedure: ESOPHAGOGASTRODUODENOSCOPY (EGD); Surgeon: Sarath Munoz MD;  Location: [facility];   Service: Gastroenterology      Family History:     Family History   Problem Relation Age of Onset   • No Known Problems Mother    • Hypertension Father       Social History:     Social History     Socioeconomic History   • Marital status: /Civil Union     Spouse name: None   • Number of children: None   • Years of education: None   • Highest education level: None   Occupational History   • None   Tobacco Use   • Smoking status: Never   • Smokeless tobacco: Never   Vaping Use   • Vaping Use: Never used   Substance and Sexual Activity   • Alcohol use: Not Currently     Comment: social   • Drug use: No   • Sexual activity: Yes   Other Topics Concern   • None   Social History Narrative   • None     Social Determinants of Health     Financial Resource Strain: Low Risk  (11/8/2023)    Overall Financial Resource Strain (CARDIA)    • Difficulty of Paying Living Expenses: Not very hard   Food Insecurity: No Food Insecurity (9/25/2023)    Hunger Vital Sign • Worried About [place] in the Last Year: Never true    • Ran Out of Food in the Last Year: Never true   Transportation Needs: No Transportation Needs (11/8/2023)    PRAPARE - Transportation    • Lack of Transportation (Medical): No    • Lack of Transportation (Non-Medical): No   Physical Activity: Not on file   Stress: Not on file   Social Connections: Not on file   Intimate Partner Violence: Not on file   Housing Stability: Low Risk  (9/25/2023)    Housing Stability Vital Sign    • Unable to Pay for Housing in the Last Year: No    • Number of Places Lived in the Last Year: 1    • Unstable Housing in the Last Year: No      Medications and Allergies:     Current Outpatient Medications   Medication Sig Dispense Refill   • atorvastatin (LIPITOR) 40 mg tablet TAKE 1 TABLET BY MOUTH EVERY DAY 90 tablet 2   • Cholecalciferol (VITAMIN D3) 2000 units capsule Take 1 capsule by mouth daily     • citalopram (CeleXA) 10 mg tablet Take 1 tablet (10 mg total) by mouth daily 45 tablet 0   • famotidine (PEPCID) 20 mg tablet Take 1 tablet (20 mg total) by mouth 2 (two) times a day as needed for heartburn or indigestion 60 tablet 0   • hydrOXYzine HCL (ATARAX) 10 mg tablet Take 1 tablet (10 mg total) by mouth every 6 (six) hours as needed for anxiety 30 tablet 1   • losartan-hydrochlorothiazide (HYZAAR) 50-12.5 mg per tablet TAKE 1 TABLET BY MOUTH EVERY DAY 90 tablet 1   • Omega-3 Fatty Acids (FISH OIL) 1,000 mg Take 1,000 mg by mouth daily     • ondansetron (ZOFRAN) 4 mg tablet Take 1 tablet (4 mg total) by mouth every 6 (six) hours as needed for nausea or vomiting 30 tablet 0   • aspirin (Aspirin 81) 81 mg EC tablet Take 1 tablet (81 mg total) by mouth daily (Patient not taking: Reported on 10/23/2023) 90 tablet 3   • pantoprazole (PROTONIX) 20 mg tablet Take 1 tablet (20 mg total) by mouth daily 31 tablet 0     No current facility-administered medications for this visit.      Allergies   Allergen Reactions   • Lisinopril Cough      Immunizations:     Immunization History   Administered Date(s) Administered   • Influenza Injectable, MDCK, Preservative Free, Quadrivalent, 0.5 mL 10/25/2018   • Influenza Quadrivalent 3 years and older 12/01/2020      Health Maintenance:         Topic Date Due   • Cervical Cancer Screening  04/26/2019   • Breast Cancer Screening: Mammogram  09/23/2022   • Colorectal Cancer Screening  08/28/2031   • HIV Screening  Completed   • Hepatitis C Screening  Completed         Topic Date Due   • COVID-19 Vaccine (1) Never done   • Pneumococcal Vaccine: 65+ Years (1 - PCV) Never done   • Influenza Vaccine (1) 09/01/2023      Medicare Screening Tests and Risk Assessments:     Jeri Sims is here for her Subsequent Wellness visit. Health Risk Assessment:   Patient rates overall health as fair. Patient feels that their physical health rating is same. Patient is satisfied with their life. Eyesight was rated as same. Hearing was rated as same. Patient feels that their emotional and mental health rating is same. Patients states they are sometimes angry. Patient states they are often unusually tired/fatigued. Pain experienced in the last 7 days has been none. Patient states that she has experienced no weight loss or gain in last 6 months. Depression Screening:   PHQ-9 Score: 0      Fall Risk Screening: In the past year, patient has experienced: no history of falling in past year      Urinary Incontinence Screening:   Patient has not leaked urine accidently in the last six months. Home Safety:  Patient has working smoke alarms and has working carbon monoxide detector. Home safety hazards include: none. Nutrition:   Current diet is Regular. Medications:   Patient is not currently taking any over-the-counter supplements. Patient is able to manage medications.      Activities of Daily Living (ADLs)/Instrumental Activities of Daily Living (IADLs):   Walk and transfer into and out of bed and chair?: Yes  Dress and groom yourself?: Yes    Bathe or shower yourself?: Yes    Feed yourself? Yes  Do your laundry/housekeeping?: Yes  Manage your money, pay your bills and track your expenses?: Yes  Make your own meals?: Yes    Do your own shopping?: Yes    Advance Care Planning:   Living will: No    Durable POA for healthcare: No    Advanced directive: No      PREVENTIVE SCREENINGS      Cardiovascular Screening:    General: Screening Not Indicated and History Lipid Disorder      Diabetes Screening:     General: Screening Current      Colorectal Cancer Screening:     General: Screening Current      Breast Cancer Screening:     General: History Breast Cancer      Cervical Cancer Screening:    General: Screening Not Indicated      Lung Cancer Screening:     General: Screening Not Indicated      Hepatitis C Screening:    General: Screening Current    Screening, Brief Intervention, and Referral to Treatment (SBIRT)    Screening  Typical number of drinks in a day: 0  Typical number of drinks in a week: 0  Interpretation: Low risk drinking behavior. Single Item Drug Screening:  How often have you used an illegal drug (including marijuana) or a prescription medication for non-medical reasons in the past year? never    Single Item Drug Screen Score: 0  Interpretation: Negative screen for possible drug use disorder    Other Counseling Topics:   Car/seat belt/driving safety. No results found. Physical Exam:     /76 (BP Location: Left arm, Patient Position: Sitting, Cuff Size: Standard)   Temp 97.7 °F (36.5 °C) (Tympanic)   Ht 5' 3" (1.6 m)   Wt 52.6 kg (116 lb)   BMI 20.55 kg/m²     Physical Exam  Constitutional:       General: She is not in acute distress. Appearance: Normal appearance. She is not ill-appearing. HENT:      Head: Normocephalic and atraumatic.       Right Ear: Tympanic membrane, ear canal and external ear normal.      Left Ear: Tympanic membrane, ear canal and external ear normal. Nose: Nose normal.      Mouth/Throat:      Mouth: Mucous membranes are moist.      Pharynx: Oropharynx is clear. No oropharyngeal exudate or posterior oropharyngeal erythema. Eyes:      General: No scleral icterus. Right eye: No discharge. Left eye: No discharge. Extraocular Movements: Extraocular movements intact. Conjunctiva/sclera: Conjunctivae normal.      Pupils: Pupils are equal, round, and reactive to light. Cardiovascular:      Rate and Rhythm: Normal rate and regular rhythm. Pulses: Normal pulses. Heart sounds: Normal heart sounds. No murmur heard. Pulmonary:      Effort: Pulmonary effort is normal. No respiratory distress. Breath sounds: Normal breath sounds. Abdominal:      General: Bowel sounds are normal.      Palpations: Abdomen is soft. Tenderness: There is no abdominal tenderness. Musculoskeletal:         General: Normal range of motion. Cervical back: Normal range of motion and neck supple. Lymphadenopathy:      Cervical: No cervical adenopathy. Skin:     General: Skin is warm and dry. Capillary Refill: Capillary refill takes less than 2 seconds. Neurological:      General: No focal deficit present. Mental Status: She is alert and oriented to person, place, and time. Mental status is at baseline. Cranial Nerves: No cranial nerve deficit. Psychiatric:         Attention and Perception: Attention and perception normal.         Mood and Affect: Mood normal. Affect is tearful.           Gene Vinson MD

## 2023-11-08 NOTE — PATIENT INSTRUCTIONS
Medicare Preventive Visit Patient Instructions  Thank you for completing your Welcome to Medicare Visit or Medicare Annual Wellness Visit today. Your next wellness visit will be due in one year (11/8/2024). The screening/preventive services that you may require over the next 5-10 years are detailed below. Some tests may not apply to you based off risk factors and/or age. Screening tests ordered at today's visit but not completed yet may show as past due. Also, please note that scanned in results may not display below. Preventive Screenings:  Service Recommendations Previous Testing/Comments   Colorectal Cancer Screening  * Colonoscopy    * Fecal Occult Blood Test (FOBT)/Fecal Immunochemical Test (FIT)  * Fecal DNA/Cologuard Test  * Flexible Sigmoidoscopy Age: 43-73 years old   Colonoscopy: every 10 years (may be performed more frequently if at higher risk)  OR  FOBT/FIT: every 1 year  OR  Cologuard: every 3 years  OR  Sigmoidoscopy: every 5 years  Screening may be recommended earlier than age 39 if at higher risk for colorectal cancer. Also, an individualized decision between you and your healthcare provider will decide whether screening between the ages of 77-80 would be appropriate. Colonoscopy: 09/18/2023  FOBT/FIT: 09/18/2023  Cologuard: Not on file  Sigmoidoscopy: Not on file          Breast Cancer Screening Age: 36 years old  Frequency: every 1-2 years  Not required if history of left and right mastectomy Mammogram: 09/23/2021        Cervical Cancer Screening Between the ages of 21-29, pap smear recommended once every 3 years. Between the ages of 32-69, can perform pap smear with HPV co-testing every 5 years.    Recommendations may differ for women with a history of total hysterectomy, cervical cancer, or abnormal pap smears in past. Pap Smear: 04/26/2016        Hepatitis C Screening Once for adults born between 1945 and 1965  More frequently in patients at high risk for Hepatitis C Hep C Antibody: 06/17/2022        Diabetes Screening 1-2 times per year if you're at risk for diabetes or have pre-diabetes Fasting glucose: 91 mg/dL (9/25/2023)  A1C: 5.5 % (6/17/2022)      Cholesterol Screening Once every 5 years if you don't have a lipid disorder. May order more often based on risk factors. Lipid panel: 08/02/2023          Other Preventive Screenings Covered by Medicare:  Abdominal Aortic Aneurysm (AAA) Screening: covered once if your at risk. You're considered to be at risk if you have a family history of AAA. Lung Cancer Screening: covers low dose CT scan once per year if you meet all of the following conditions: (1) Age 48-67; (2) No signs or symptoms of lung cancer; (3) Current smoker or have quit smoking within the last 15 years; (4) You have a tobacco smoking history of at least 20 pack years (packs per day multiplied by number of years you smoked); (5) You get a written order from a healthcare provider. Glaucoma Screening: covered annually if you're considered high risk: (1) You have diabetes OR (2) Family history of glaucoma OR (3)  aged 48 and older OR (3)  American aged 72 and older  Osteoporosis Screening: covered every 2 years if you meet one of the following conditions: (1) You're estrogen deficient and at risk for osteoporosis based off medical history and other findings; (2) Have a vertebral abnormality; (3) On glucocorticoid therapy for more than 3 months; (4) Have primary hyperparathyroidism; (5) On osteoporosis medications and need to assess response to drug therapy. Last bone density test (DXA Scan): Not on file. HIV Screening: covered annually if you're between the age of 14-79. Also covered annually if you are younger than 13 and older than 72 with risk factors for HIV infection. For pregnant patients, it is covered up to 3 times per pregnancy.     Immunizations:  Immunization Recommendations   Influenza Vaccine Annual influenza vaccination during flu season is recommended for all persons aged >= 6 months who do not have contraindications   Pneumococcal Vaccine   * Pneumococcal conjugate vaccine = PCV13 (Prevnar 13), PCV15 (Vaxneuvance), PCV20 (Prevnar 20)  * Pneumococcal polysaccharide vaccine = PPSV23 (Pneumovax) Adults 29-66 yo with certain risk factors or if 69+ yo  If never received any pneumonia vaccine: recommend Prevnar 20 (PCV20)  Give PCV20 if previously received 1 dose of PCV13 or PPSV23   Hepatitis B Vaccine 3 dose series if at intermediate or high risk (ex: diabetes, end stage renal disease, liver disease)   Respiratory syncytial virus (RSV) Vaccine - COVERED BY MEDICARE PART D  * RSVPreF3 (Arexvy) CDC recommends that adults 61years of age and older may receive a single dose of RSV vaccine using shared clinical decision-making (SCDM)   Tetanus (Td) Vaccine - COST NOT COVERED BY MEDICARE PART B Following completion of primary series, a booster dose should be given every 10 years to maintain immunity against tetanus. Td may also be given as tetanus wound prophylaxis. Tdap Vaccine - COST NOT COVERED BY MEDICARE PART B Recommended at least once for all adults. For pregnant patients, recommended with each pregnancy. Shingles Vaccine (Shingrix) - COST NOT COVERED BY MEDICARE PART B  2 shot series recommended in those 19 years and older who have or will have weakened immune systems or those 50 years and older     Health Maintenance Due:      Topic Date Due   • Cervical Cancer Screening  04/26/2019   • Breast Cancer Screening: Mammogram  09/23/2022   • Colorectal Cancer Screening  08/28/2031   • HIV Screening  Completed   • Hepatitis C Screening  Completed     Immunizations Due:      Topic Date Due   • COVID-19 Vaccine (1) Never done   • Pneumococcal Vaccine: 65+ Years (1 - PCV) Never done   • Influenza Vaccine (1) 09/01/2023     Advance Directives   What are advance directives?   Advance directives are legal documents that state your wishes and plans for medical care. These plans are made ahead of time in case you lose your ability to make decisions for yourself. Advance directives can apply to any medical decision, such as the treatments you want, and if you want to donate organs. What are the types of advance directives? There are many types of advance directives, and each state has rules about how to use them. You may choose a combination of any of the following:  Living will: This is a written record of the treatment you want. You can also choose which treatments you do not want, which to limit, and which to stop at a certain time. This includes surgery, medicine, IV fluid, and tube feedings. Durable power of  for healthcare [facility] [place]): This is a written record that states who you want to make healthcare choices for you when you are unable to make them for yourself. This person, called a proxy, is usually a family member or a friend. You may choose more than 1 proxy. Do not resuscitate (DNR) order:  A DNR order is used in case your heart stops beating or you stop breathing. It is a request not to have certain forms of treatment, such as CPR. A DNR order may be included in other types of advance directives. Medical directive: This covers the care that you want if you are in a coma, near death, or unable to make decisions for yourself. You can list the treatments you want for each condition. Treatment may include pain medicine, surgery, blood transfusions, dialysis, IV or tube feedings, and a ventilator (breathing machine). Values history: This document has questions about your views, beliefs, and how you feel and think about life. This information can help others choose the care that you would choose. Why are advance directives important? An advance directive helps you control your care. Although spoken wishes may be used, it is better to have your wishes written down. Spoken wishes can be misunderstood, or not followed.  Treatments may be given even if you do not want them. An advance directive may make it easier for your family to make difficult choices about your care. © Copyright [employer] 2018 Information is for End User's use only and may not be sold, redistributed or otherwise used for commercial purposes.  All illustrations and images included in CareNotes® are the copyrighted property of [employer] or [address] [place] [address]

## 2023-11-09 ENCOUNTER — OFFICE VISIT (OUTPATIENT)
Age: 63
End: 2023-11-09
Payer: COMMERCIAL

## 2023-11-09 VITALS
BODY MASS INDEX: 20.55 KG/M2 | OXYGEN SATURATION: 96 % | HEART RATE: 87 BPM | DIASTOLIC BLOOD PRESSURE: 80 MMHG | SYSTOLIC BLOOD PRESSURE: 140 MMHG | WEIGHT: 116 LBS | HEIGHT: 63 IN

## 2023-11-09 DIAGNOSIS — K92.1 MELENA: ICD-10-CM

## 2023-11-09 DIAGNOSIS — K92.2 GI BLEED: ICD-10-CM

## 2023-11-09 DIAGNOSIS — R10.9 ABDOMINAL PAIN: ICD-10-CM

## 2023-11-09 DIAGNOSIS — R19.5 OCCULT BLOOD IN STOOLS: Primary | ICD-10-CM

## 2023-11-09 PROCEDURE — 99213 OFFICE O/P EST LOW 20 MIN: CPT | Performed by: INTERNAL MEDICINE

## 2023-11-09 RX ORDER — PANTOPRAZOLE SODIUM 20 MG/1
20 TABLET, DELAYED RELEASE ORAL DAILY
Qty: 31 TABLET | Refills: 0 | Status: SHIPPED | OUTPATIENT
Start: 2023-11-09 | End: 2023-12-10

## 2023-11-09 NOTE — H&P (VIEW-ONLY)
[facility]      Chief Complaint: Blood in the stool    HPI:  Sonya Milner is a 70 y.o.  female who presents with colonoscopy 3 years ago. Patient has had recent positive fit test after having what she believed to be a GI bleed. She also had an abnormal ultrasound but follow-up MRI MRCP revealed it to be artifact. She has no other significant complaints at the present time. She does suffer from anxiety. No weight loss. No melena hematochezia or rectal bleeding. Review of Systems:   Constitutional: No fever or chills, feels well, no tiredness, no recent weight gain or weight loss. HENT: No complaints of earache, no hearing loss, no nosebleeds, no nasal discharge, no sore throat, no hoarseness. Eyes: No complaints of eye pain, no red eyes, no discharge from eyes, no itchy eyes. Cardiovascular: No complaints of slow heart rate, no fast heart rate, no chest pain, no palpitations, no leg claudication, no lower extremity edema. Respiratory: No complaints of shortness of breath, no wheezing, no cough, no SOB on exertion, no orthopnea. Gastrointestinal: As noted in HPI  Genitourinary: No complaints of dysuria, no incontinence, no hesitancy, no nocturia. Musculoskeletal: No complaints of arthralgia, no myalgias, no joint swelling or stiffness, no limb pain or swelling. Neurological: No complaints of headache, no confusion, no convulsions, no numbness or tingling, no dizziness or fainting, no limb weakness, no difficulty walking. Skin: No complaints of skin rash or skin lesions, no itching, no skin wound, no dry skin. Hematological/Lymphatic: No complaints of swollen glands, does not bleed easy. Allergic/Immunologic: No immunocompromised state. Endocrine:  No complaints of polyuria, no polydipsia. Psychiatric/Behavioral: is not suicidal, no sleep disturbances, positive anxiety no change in personality, no emotional problems.        Historical Information   Past Medical History:   Diagnosis Date    Anemia     Aortic valve disease     Breast cancer (HCC)     Cardiac murmur     Hyperlipidemia      Past Surgical History:   Procedure Laterality Date    BREAST SURGERY      lumpectomy    COLONOSCOPY      LA ESOPHAGOGASTRODUODENOSCOPY TRANSORAL DIAGNOSTIC N/A 2/19/2019    Procedure: ESOPHAGOGASTRODUODENOSCOPY (EGD); Surgeon: Herminio Garcia MD;  Location: [facility]; Service: Gastroenterology     Social History   Social History     Substance and Sexual Activity   Alcohol Use Not Currently    Comment: social     Social History     Substance and Sexual Activity   Drug Use No     Social History     Tobacco Use   Smoking Status Never   Smokeless Tobacco Never     Family History   Problem Relation Age of Onset    No Known Problems Mother     Hypertension Father      MRI abdomen wo contrast and mrcp  Status: Final result     PACS Images     Show images for MRI abdomen wo contrast and mrcp  Study Result    Narrative & Impression   MRI OF THE ABDOMEN WITHOUT CONTRAST WITH MRCP     INDICATION: 56-year-old female with prominent but tapering common bile duct. COMPARISON: Right upper quadrant ultrasound dated 9/21/2023 and CT dated 9/19/2023. TECHNIQUE:  Multiplanar/multisequence MRI of the abdomen with 3D MRCP was performed without the administration of contrast.     FINDINGS:     LOWER CHEST:   Unremarkable. LIVER:  Liver is normal in size and contour. No steatosis or evidence for iron overload. No gross mass lesions on this noncontrast study. Limited evaluation of hepatic veins and portal veins on this non-contrast MRI is unremarkable. BILE DUCTS:  No intrahepatic or extrahepatic bile duct dilation. Common bile duct is normal in caliber for age measuring 6 mm proximally and tapering distally. No choledocholithiasis, biliary stricture, or suspicious mass. GALLBLADDER:  Normal.     PANCREAS:  Unremarkable. ADRENAL GLANDS:  Unremarkable.      SPLEEN:  Normal. KIDNEYS/PROXIMAL URETERS: Renal collecting systems are decompressed. Tiny benign peripelvic cysts. No suspicious renal masses on either side. No perinephric collections. BOWEL:  No dilated loops of bowel. PERITONEAL CAVITY/RETROPERITONEUM: No ascites or encapsulated collections. LYMPH NODES:  No abdominal lymphadenopathy. VASCULAR STRUCTURES: Grossly unremarkable. No aneurysms evident. ABDOMINAL WALL: Minuscule fat-containing umbilical hernia superimposed on mild diastasis recti. OSSEOUS STRUCTURES: Multiple tiny, benign perineural (Tarlov) cysts noted at the spine and sacrum. IMPRESSION:     Biliary ducts are normal in caliber. No choledocholithiasis or evidence for obstructive cholangiopathy. Pseudo-distended appearance on prior ultrasound likely attributable to artifact from scanning angle and volume averaging, possibly at the confluence   of the hepatic ducts. No acute abdominal findings. Current Medications: has a current medication list which includes the following prescription(s): atorvastatin, vitamin d3, citalopram, famotidine, hydroxyzine hcl, losartan-hydrochlorothiazide, fish oil, ondansetron, pantoprazole, and aspirin. Vital Signs: /80   Pulse 87   Ht 5' 3" (1.6 m)   Wt 52.6 kg (116 lb)   SpO2 96%   BMI 20.55 kg/m²       Physical Exam:   Constitutional  General Appearance: No acute distress, well appearing and well nourished  Head  Normocephalic  Eyes  Conjunctivae and lids: No swelling, erythema, or discharge. Pupils and irises: Equal, round and reactive to light. Ears, Nose, Mouth, and Throat  External inspection of ears and nose: Normal  Nasal mucosa, septum and turbinates: Normal without edema or erythema/   Oropharynx: Normal with no erythema, edema, exudate or lesions. Neck  Normal range of motion. Neck supple. Cardiovascular  Auscultation of the heart: Normal rate and rhythm, normal S1 and S2 without murmurs.   Examination of the extremities for edema and/or varicosities: Normal  Pulmonary/Chest  Respiratory effort: No increased work of breathing or signs of respiratory distress. Auscultation of lungs: Clear to auscultation, equal breath sounds bilaterally, no wheezes, rales, no rhonchi. Abdomen  Abdomen: Non-tender, no masses. Liver and spleen: No hepatomegaly or splenomegaly. Musculoskeletal  Gait and station: normal.  Digits and Nails: normal without clubbing or cyanosis. Inspection/palpation of joints, bones, and muscles: Normal  Neurological  No nystagmus or asterixis. Skin  Skin and subcutaneous tissue: Normal without rashes or lesions. Lymphatic  Palpation of the lymph nodes in neck: No lymphadenopathy. Psychiatric  Orientation to person, place and time: Normal.  Mood and affect: Normal.         Labs:  Lab Results   Component Value Date    ALT 16 10/25/2023    AST 15 10/25/2023    BUN 7 10/25/2023    CALCIUM 9.7 10/25/2023     10/25/2023    CHOL 216 (H) 03/22/2016    CO2 28 10/25/2023    CREATININE 0.64 10/25/2023    HDL 69 08/02/2023    HCT 43.6 10/25/2023    HGB 14.0 10/25/2023    HGBA1C 5.5 06/17/2022    MG 2.3 09/25/2023     10/25/2023    K 3.6 10/25/2023     03/22/2016    TRIG 74 08/02/2023    WBC 4.27 (L) 10/25/2023         X-Rays & Procedures:   No orders to display           ______________________________________________________________________      Assessment & Plan:     Diagnoses and all orders for this visit:    Occult blood in stools  -     Colonoscopy; Future    Melena  -     pantoprazole (PROTONIX) 20 mg tablet; Take 1 tablet (20 mg total) by mouth daily    Abdominal pain  -     pantoprazole (PROTONIX) 20 mg tablet; Take 1 tablet (20 mg total) by mouth daily    GI bleed  -     pantoprazole (PROTONIX) 20 mg tablet; Take 1 tablet (20 mg total) by mouth daily      Patient will reschedule for colonoscopy. Further recommendations will depend on study results.

## 2023-11-09 NOTE — PROGRESS NOTES
[facility]      Chief Complaint: Blood in the stool    HPI:  Jhon Rubi is a 70 y.o.  female who presents with colonoscopy 3 years ago. Patient has had recent positive fit test after having what she believed to be a GI bleed. She also had an abnormal ultrasound but follow-up MRI MRCP revealed it to be artifact. She has no other significant complaints at the present time. She does suffer from anxiety. No weight loss. No melena hematochezia or rectal bleeding. Review of Systems:   Constitutional: No fever or chills, feels well, no tiredness, no recent weight gain or weight loss. HENT: No complaints of earache, no hearing loss, no nosebleeds, no nasal discharge, no sore throat, no hoarseness. Eyes: No complaints of eye pain, no red eyes, no discharge from eyes, no itchy eyes. Cardiovascular: No complaints of slow heart rate, no fast heart rate, no chest pain, no palpitations, no leg claudication, no lower extremity edema. Respiratory: No complaints of shortness of breath, no wheezing, no cough, no SOB on exertion, no orthopnea. Gastrointestinal: As noted in HPI  Genitourinary: No complaints of dysuria, no incontinence, no hesitancy, no nocturia. Musculoskeletal: No complaints of arthralgia, no myalgias, no joint swelling or stiffness, no limb pain or swelling. Neurological: No complaints of headache, no confusion, no convulsions, no numbness or tingling, no dizziness or fainting, no limb weakness, no difficulty walking. Skin: No complaints of skin rash or skin lesions, no itching, no skin wound, no dry skin. Hematological/Lymphatic: No complaints of swollen glands, does not bleed easy. Allergic/Immunologic: No immunocompromised state. Endocrine:  No complaints of polyuria, no polydipsia. Psychiatric/Behavioral: is not suicidal, no sleep disturbances, positive anxiety no change in personality, no emotional problems.        Historical Information   Past Medical History:   Diagnosis Date    Anemia     Aortic valve disease     Breast cancer (HCC)     Cardiac murmur     Hyperlipidemia      Past Surgical History:   Procedure Laterality Date    BREAST SURGERY      lumpectomy    COLONOSCOPY      DE ESOPHAGOGASTRODUODENOSCOPY TRANSORAL DIAGNOSTIC N/A 2/19/2019    Procedure: ESOPHAGOGASTRODUODENOSCOPY (EGD); Surgeon: Philomena Pena MD;  Location: [facility]; Service: Gastroenterology     Social History   Social History     Substance and Sexual Activity   Alcohol Use Not Currently    Comment: social     Social History     Substance and Sexual Activity   Drug Use No     Social History     Tobacco Use   Smoking Status Never   Smokeless Tobacco Never     Family History   Problem Relation Age of Onset    No Known Problems Mother     Hypertension Father      MRI abdomen wo contrast and mrcp  Status: Final result     PACS Images     Show images for MRI abdomen wo contrast and mrcp  Study Result    Narrative & Impression   MRI OF THE ABDOMEN WITHOUT CONTRAST WITH MRCP     INDICATION: 70-year-old female with prominent but tapering common bile duct. COMPARISON: Right upper quadrant ultrasound dated 9/21/2023 and CT dated 9/19/2023. TECHNIQUE:  Multiplanar/multisequence MRI of the abdomen with 3D MRCP was performed without the administration of contrast.     FINDINGS:     LOWER CHEST:   Unremarkable. LIVER:  Liver is normal in size and contour. No steatosis or evidence for iron overload. No gross mass lesions on this noncontrast study. Limited evaluation of hepatic veins and portal veins on this non-contrast MRI is unremarkable. BILE DUCTS:  No intrahepatic or extrahepatic bile duct dilation. Common bile duct is normal in caliber for age measuring 6 mm proximally and tapering distally. No choledocholithiasis, biliary stricture, or suspicious mass. GALLBLADDER:  Normal.     PANCREAS:  Unremarkable. ADRENAL GLANDS:  Unremarkable.      SPLEEN:  Normal. KIDNEYS/PROXIMAL URETERS: Renal collecting systems are decompressed. Tiny benign peripelvic cysts. No suspicious renal masses on either side. No perinephric collections. BOWEL:  No dilated loops of bowel. PERITONEAL CAVITY/RETROPERITONEUM: No ascites or encapsulated collections. LYMPH NODES:  No abdominal lymphadenopathy. VASCULAR STRUCTURES: Grossly unremarkable. No aneurysms evident. ABDOMINAL WALL: Minuscule fat-containing umbilical hernia superimposed on mild diastasis recti. OSSEOUS STRUCTURES: Multiple tiny, benign perineural (Tarlov) cysts noted at the spine and sacrum. IMPRESSION:     Biliary ducts are normal in caliber. No choledocholithiasis or evidence for obstructive cholangiopathy. Pseudo-distended appearance on prior ultrasound likely attributable to artifact from scanning angle and volume averaging, possibly at the confluence   of the hepatic ducts. No acute abdominal findings. Current Medications: has a current medication list which includes the following prescription(s): atorvastatin, vitamin d3, citalopram, famotidine, hydroxyzine hcl, losartan-hydrochlorothiazide, fish oil, ondansetron, pantoprazole, and aspirin. Vital Signs: /80   Pulse 87   Ht 5' 3" (1.6 m)   Wt 52.6 kg (116 lb)   SpO2 96%   BMI 20.55 kg/m²       Physical Exam:   Constitutional  General Appearance: No acute distress, well appearing and well nourished  Head  Normocephalic  Eyes  Conjunctivae and lids: No swelling, erythema, or discharge. Pupils and irises: Equal, round and reactive to light. Ears, Nose, Mouth, and Throat  External inspection of ears and nose: Normal  Nasal mucosa, septum and turbinates: Normal without edema or erythema/   Oropharynx: Normal with no erythema, edema, exudate or lesions. Neck  Normal range of motion. Neck supple. Cardiovascular  Auscultation of the heart: Normal rate and rhythm, normal S1 and S2 without murmurs.   Examination of the extremities for edema and/or varicosities: Normal  Pulmonary/Chest  Respiratory effort: No increased work of breathing or signs of respiratory distress. Auscultation of lungs: Clear to auscultation, equal breath sounds bilaterally, no wheezes, rales, no rhonchi. Abdomen  Abdomen: Non-tender, no masses. Liver and spleen: No hepatomegaly or splenomegaly. Musculoskeletal  Gait and station: normal.  Digits and Nails: normal without clubbing or cyanosis. Inspection/palpation of joints, bones, and muscles: Normal  Neurological  No nystagmus or asterixis. Skin  Skin and subcutaneous tissue: Normal without rashes or lesions. Lymphatic  Palpation of the lymph nodes in neck: No lymphadenopathy. Psychiatric  Orientation to person, place and time: Normal.  Mood and affect: Normal.         Labs:  Lab Results   Component Value Date    ALT 16 10/25/2023    AST 15 10/25/2023    BUN 7 10/25/2023    CALCIUM 9.7 10/25/2023     10/25/2023    CHOL 216 (H) 03/22/2016    CO2 28 10/25/2023    CREATININE 0.64 10/25/2023    HDL 69 08/02/2023    HCT 43.6 10/25/2023    HGB 14.0 10/25/2023    HGBA1C 5.5 06/17/2022    MG 2.3 09/25/2023     10/25/2023    K 3.6 10/25/2023     03/22/2016    TRIG 74 08/02/2023    WBC 4.27 (L) 10/25/2023         X-Rays & Procedures:   No orders to display           ______________________________________________________________________      Assessment & Plan:     Diagnoses and all orders for this visit:    Occult blood in stools  -     Colonoscopy; Future    Melena  -     pantoprazole (PROTONIX) 20 mg tablet; Take 1 tablet (20 mg total) by mouth daily    Abdominal pain  -     pantoprazole (PROTONIX) 20 mg tablet; Take 1 tablet (20 mg total) by mouth daily    GI bleed  -     pantoprazole (PROTONIX) 20 mg tablet; Take 1 tablet (20 mg total) by mouth daily      Patient will reschedule for colonoscopy. Further recommendations will depend on study results.

## 2023-11-11 DIAGNOSIS — K92.2 GI BLEED: ICD-10-CM

## 2023-11-11 DIAGNOSIS — R10.9 ABDOMINAL PAIN: ICD-10-CM

## 2023-11-11 DIAGNOSIS — K92.1 MELENA: ICD-10-CM

## 2023-11-13 RX ORDER — FAMOTIDINE 20 MG/1
20 TABLET, FILM COATED ORAL 2 TIMES DAILY PRN
Qty: 180 TABLET | Refills: 1 | Status: SHIPPED | OUTPATIENT
Start: 2023-11-13

## 2023-11-20 ENCOUNTER — TELEPHONE (OUTPATIENT)
Dept: FAMILY MEDICINE CLINIC | Facility: CLINIC | Age: 63
End: 2023-11-20

## 2023-11-20 NOTE — TELEPHONE ENCOUNTER
Received letter in the mail from [employer] that Htydroxyzine requires a prior auth.      Proceed with PA?

## 2023-11-21 NOTE — TELEPHONE ENCOUNTER
PA submitted through [employer] portal. We are awaiting a determination of coverage from pt's plan. Milena Cook Key: [id number] - PA Case ID: [id number]  Need help?  Call us at [phone]  Status   Sent to [employer]

## 2023-11-28 DIAGNOSIS — F41.9 ANXIETY: ICD-10-CM

## 2023-11-28 RX ORDER — HYDROXYZINE HYDROCHLORIDE 10 MG/1
10 TABLET, FILM COATED ORAL EVERY 6 HOURS PRN
Qty: 30 TABLET | Refills: 1 | Status: SHIPPED | OUTPATIENT
Start: 2023-11-28

## 2023-12-03 DIAGNOSIS — K92.2 GI BLEED: ICD-10-CM

## 2023-12-03 DIAGNOSIS — R10.9 ABDOMINAL PAIN: ICD-10-CM

## 2023-12-03 DIAGNOSIS — K92.1 MELENA: ICD-10-CM

## 2023-12-04 RX ORDER — PANTOPRAZOLE SODIUM 20 MG/1
20 TABLET, DELAYED RELEASE ORAL DAILY
Qty: 30 TABLET | Refills: 5 | Status: SHIPPED | OUTPATIENT
Start: 2023-12-04

## 2023-12-05 ENCOUNTER — HOSPITAL ENCOUNTER (OUTPATIENT)
Dept: GASTROENTEROLOGY | Facility: HOSPITAL | Age: 63
Setting detail: OUTPATIENT SURGERY
Discharge: HOME | End: 2023-12-05
Attending: INTERNAL MEDICINE | Admitting: INTERNAL MEDICINE
Payer: COMMERCIAL

## 2023-12-05 ENCOUNTER — ANESTHESIA EVENT (OUTPATIENT)
Dept: GASTROENTEROLOGY | Facility: HOSPITAL | Age: 63
End: 2023-12-05

## 2023-12-05 ENCOUNTER — ANESTHESIA (OUTPATIENT)
Dept: GASTROENTEROLOGY | Facility: HOSPITAL | Age: 63
End: 2023-12-05

## 2023-12-05 VITALS
HEIGHT: 63 IN | TEMPERATURE: 98.7 F | DIASTOLIC BLOOD PRESSURE: 72 MMHG | BODY MASS INDEX: 20.51 KG/M2 | OXYGEN SATURATION: 98 % | SYSTOLIC BLOOD PRESSURE: 159 MMHG | WEIGHT: 115.74 LBS | HEART RATE: 58 BPM | RESPIRATION RATE: 16 BRPM

## 2023-12-05 DIAGNOSIS — R19.5 POSITIVE FIT (FECAL IMMUNOCHEMICAL TEST): ICD-10-CM

## 2023-12-05 PROCEDURE — G0121 COLON CA SCRN NOT HI RSK IND: HCPCS | Performed by: INTERNAL MEDICINE

## 2023-12-05 RX ORDER — LIDOCAINE HYDROCHLORIDE 10 MG/ML
0.5 INJECTION, SOLUTION EPIDURAL; INFILTRATION; INTRACAUDAL; PERINEURAL ONCE AS NEEDED
Status: CANCELLED | OUTPATIENT
Start: 2023-12-05

## 2023-12-05 RX ORDER — SODIUM CHLORIDE, SODIUM LACTATE, POTASSIUM CHLORIDE, CALCIUM CHLORIDE 600; 310; 30; 20 MG/100ML; MG/100ML; MG/100ML; MG/100ML
INJECTION, SOLUTION INTRAVENOUS CONTINUOUS PRN
Status: DISCONTINUED | OUTPATIENT
Start: 2023-12-05 | End: 2023-12-05

## 2023-12-05 RX ORDER — SODIUM CHLORIDE, SODIUM LACTATE, POTASSIUM CHLORIDE, CALCIUM CHLORIDE 600; 310; 30; 20 MG/100ML; MG/100ML; MG/100ML; MG/100ML
50 INJECTION, SOLUTION INTRAVENOUS CONTINUOUS
Status: CANCELLED | OUTPATIENT
Start: 2023-12-05

## 2023-12-05 RX ORDER — GLYCOPYRROLATE 0.2 MG/ML
INJECTION INTRAMUSCULAR; INTRAVENOUS AS NEEDED
Status: DISCONTINUED | OUTPATIENT
Start: 2023-12-05 | End: 2023-12-05

## 2023-12-05 RX ORDER — PROPOFOL 10 MG/ML
INJECTION, EMULSION INTRAVENOUS AS NEEDED
Status: DISCONTINUED | OUTPATIENT
Start: 2023-12-05 | End: 2023-12-05

## 2023-12-05 RX ORDER — LIDOCAINE HYDROCHLORIDE 20 MG/ML
INJECTION, SOLUTION EPIDURAL; INFILTRATION; INTRACAUDAL; PERINEURAL AS NEEDED
Status: DISCONTINUED | OUTPATIENT
Start: 2023-12-05 | End: 2023-12-05

## 2023-12-05 RX ADMIN — PROPOFOL 20 MG: 10 INJECTION, EMULSION INTRAVENOUS at 08:30

## 2023-12-05 RX ADMIN — SODIUM CHLORIDE, SODIUM LACTATE, POTASSIUM CHLORIDE, AND CALCIUM CHLORIDE: .6; .31; .03; .02 INJECTION, SOLUTION INTRAVENOUS at 08:17

## 2023-12-05 RX ADMIN — PROPOFOL 20 MG: 10 INJECTION, EMULSION INTRAVENOUS at 08:27

## 2023-12-05 RX ADMIN — GLYCOPYRROLATE 0.1 MG: 0.2 INJECTION, SOLUTION INTRAMUSCULAR; INTRAVENOUS at 08:26

## 2023-12-05 RX ADMIN — PROPOFOL 40 MG: 10 INJECTION, EMULSION INTRAVENOUS at 08:24

## 2023-12-05 RX ADMIN — LIDOCAINE HYDROCHLORIDE 100 MG: 20 INJECTION, SOLUTION EPIDURAL; INFILTRATION; INTRACAUDAL at 08:20

## 2023-12-05 RX ADMIN — PROPOFOL 70 MG: 10 INJECTION, EMULSION INTRAVENOUS at 08:20

## 2023-12-05 NOTE — ANESTHESIA PREPROCEDURE EVALUATION
Procedure:  COLONOSCOPY    Relevant Problems   CARDIO   (+) Hyperlipidemia   (+) Primary hypertension      NEURO/PSYCH   (+) Anxiety   (+) Depression      Denies recent fever, cough or other symptom of upper respiratory tract infection. Confirmed NPO appropriate    Physical Exam    Airway    Mallampati score: II  TM Distance: >3 FB  Neck ROM: full     Dental   No notable dental hx     Cardiovascular      Pulmonary      Other Findings  post-pubertal.    12/29/22 TTE: Normal biventricular systolic function. No significant valvular disease    Anesthesia Plan  ASA Score- 2     Anesthesia Type- IV sedation with anesthesia with ASA Monitors. Additional Monitors:     Airway Plan:     Comment: I discussed the risks and benefits of IV sedation anesthesia including the possibility of the need to convert to general anesthesia and the potential risk of awareness. Plan Factors-Exercise tolerance (METS): >4 METS. Exercise comment: Able to climb two flights of stairs without cardiopulmonary limitation. Chart reviewed. Existing labs reviewed. Patient is not a current smoker. Patient did not smoke on day of surgery. Induction- intravenous. Postoperative Plan-     Informed Consent- Anesthetic plan and risks discussed with patient.

## 2023-12-05 NOTE — ANESTHESIA POSTPROCEDURE EVALUATION
Post-Op Assessment Note    CV Status:  Stable  Pain Score: 0    Pain management: adequate       Mental Status:  Alert and awake   Hydration Status:  Euvolemic   PONV Controlled:  Controlled   Airway Patency:  Patent and adequate  Two or more mitigation strategies used for obstructive sleep apnea   Post Op Vitals Reviewed: Yes    No anethesia notable event occurred.     Staff: CRNA               BP   111/62   Temp      Pulse 57   Resp 20   SpO2 98

## 2023-12-05 NOTE — INTERVAL H&P NOTE
H&P reviewed. After examining the patient I find no changes in the patients condition since the H&P had been written.     Vitals:    12/05/23 0808   BP: 170/82   Pulse: 58   Resp: 18   Temp: 97.6 °F (36.4 °C)   SpO2: 98%

## 2023-12-11 DIAGNOSIS — F41.9 ANXIETY: ICD-10-CM

## 2023-12-11 RX ORDER — HYDROXYZINE HYDROCHLORIDE 10 MG/1
10 TABLET, FILM COATED ORAL EVERY 6 HOURS PRN
Qty: 90 TABLET | Refills: 0 | Status: SHIPPED | OUTPATIENT
Start: 2023-12-11

## 2023-12-15 DIAGNOSIS — F41.9 ANXIETY: ICD-10-CM

## 2023-12-15 RX ORDER — CITALOPRAM HYDROBROMIDE 10 MG/1
10 TABLET ORAL DAILY
Qty: 90 TABLET | Refills: 1 | Status: SHIPPED | OUTPATIENT
Start: 2023-12-15 | End: 2023-12-20

## 2023-12-18 ENCOUNTER — TELEPHONE (OUTPATIENT)
Dept: FAMILY MEDICINE CLINIC | Facility: CLINIC | Age: 63
End: 2023-12-18

## 2023-12-18 NOTE — TELEPHONE ENCOUNTER
Pt called - has appt 12/20 @ 9:20am and wants to be seen in the afternoon - only appt is a same-day at 1pm.  Ok to move appt?

## 2023-12-20 ENCOUNTER — RA CDI HCC (OUTPATIENT)
Dept: OTHER | Facility: HOSPITAL | Age: 63
End: 2023-12-20

## 2023-12-20 ENCOUNTER — OFFICE VISIT (OUTPATIENT)
Dept: FAMILY MEDICINE CLINIC | Facility: CLINIC | Age: 63
End: 2023-12-20
Payer: COMMERCIAL

## 2023-12-20 VITALS
WEIGHT: 114 LBS | OXYGEN SATURATION: 97 % | HEART RATE: 61 BPM | DIASTOLIC BLOOD PRESSURE: 78 MMHG | HEIGHT: 63 IN | TEMPERATURE: 97.7 F | BODY MASS INDEX: 20.2 KG/M2 | SYSTOLIC BLOOD PRESSURE: 136 MMHG

## 2023-12-20 DIAGNOSIS — R00.2 PALPITATIONS: ICD-10-CM

## 2023-12-20 DIAGNOSIS — F41.9 ANXIETY: Primary | ICD-10-CM

## 2023-12-20 DIAGNOSIS — I10 PRIMARY HYPERTENSION: ICD-10-CM

## 2023-12-20 DIAGNOSIS — R63.0 POOR APPETITE: ICD-10-CM

## 2023-12-20 PROCEDURE — 99214 OFFICE O/P EST MOD 30 MIN: CPT | Performed by: STUDENT IN AN ORGANIZED HEALTH CARE EDUCATION/TRAINING PROGRAM

## 2023-12-20 RX ORDER — BUPROPION HYDROCHLORIDE 150 MG/1
150 TABLET ORAL EVERY MORNING
Qty: 45 TABLET | Refills: 0 | Status: SHIPPED | OUTPATIENT
Start: 2023-12-20 | End: 2024-12-14

## 2023-12-20 NOTE — PROGRESS NOTES
Assessment/Plan:         Problem List Items Addressed This Visit        Cardiovascular and Mediastinum    Primary hypertension    Relevant Orders    Albumin / creatinine urine ratio    Comprehensive metabolic panel       Other    Anxiety - Primary    Relevant Medications    buPROPion (WELLBUTRIN XL) 150 mg 24 hr tablet    Other Relevant Orders    TSH + Free T4   Other Visit Diagnoses     Poor appetite        Relevant Medications    buPROPion (WELLBUTRIN XL) 150 mg 24 hr tablet    Other Relevant Orders    TSH + Free T4    Palpitations        Relevant Orders    ECG 12 lead    Echo complete w/ contrast if indicated    Holter monitor          Start wellbutrin, call in a week if any side effects. Do believe some of the symptoms are related to stress with ill     Subjective:      Patient ID: Debra Christiansen is a 63 y.o. female.    HPI    Took Celexa 2x, felt very dizzy and fatigued. Second time took at night, then fell asleep for a few hours and couldn't get up.    Still very stressed, taking care of nicole (hsubadn) who is very ill    The following portions of the patient's history were reviewed and updated as appropriate:   Past Medical History:  She has a past medical history of Anemia, Aortic valve disease, Breast cancer (HCC), Cardiac murmur, and Hyperlipidemia.,  _______________________________________________________________________  Medical Problems:  does not have any pertinent problems on file.,  _______________________________________________________________________  Past Surgical History:   has a past surgical history that includes Breast surgery; pr esophagogastroduodenoscopy transoral diagnostic (N/A, 2/19/2019); and Colonoscopy.,  _______________________________________________________________________  Family History:  family history includes Hypertension in her father; No Known Problems in her mother.,  _______________________________________________________________________  Social History:   reports that  she has never smoked. She has never used smokeless tobacco. She reports that she does not currently use alcohol. She reports that she does not use drugs.,  _______________________________________________________________________  Allergies:  is allergic to lisinopril..  _______________________________________________________________________  Current Outpatient Medications   Medication Sig Dispense Refill   • buPROPion (WELLBUTRIN XL) 150 mg 24 hr tablet Take 1 tablet (150 mg total) by mouth every morning 45 tablet 0   • Cholecalciferol (VITAMIN D3) 2000 units capsule Take 1 capsule by mouth daily     • hydrOXYzine HCL (ATARAX) 10 mg tablet Take 1 tablet (10 mg total) by mouth every 6 (six) hours as needed for anxiety 90 tablet 0   • Omega-3 Fatty Acids (FISH OIL) 1,000 mg Take 1,000 mg by mouth daily     • aspirin (Aspirin 81) 81 mg EC tablet Take 1 tablet (81 mg total) by mouth daily (Patient not taking: Reported on 10/23/2023) 90 tablet 3   • pantoprazole (PROTONIX) 20 mg tablet TAKE 1 TABLET BY MOUTH EVERY DAY (Patient not taking: Reported on 12/20/2023) 30 tablet 5     No current facility-administered medications for this visit.     _______________________________________________________________________  Review of Systems   Constitutional:  Negative for chills, fatigue and fever.   HENT:  Negative for rhinorrhea and sore throat.    Eyes:  Negative for visual disturbance.   Respiratory:  Negative for cough and shortness of breath.    Cardiovascular:  Positive for palpitations. Negative for chest pain.   Gastrointestinal:  Negative for abdominal pain, constipation, diarrhea, nausea and vomiting.   Genitourinary:  Negative for difficulty urinating, dysuria and frequency.   Musculoskeletal:  Negative for arthralgias and myalgias.   Skin:  Negative for color change and rash.   Neurological:  Negative for weakness and headaches.   Psychiatric/Behavioral:  Positive for behavioral problems and sleep disturbance. The  "patient is nervous/anxious.          Objective:  Vitals:    12/20/23 1307   BP: 136/78   Pulse: 61   Temp: 97.7 °F (36.5 °C)   SpO2: 97%   Weight: 51.7 kg (114 lb)   Height: 5' 3\" (1.6 m)     Body mass index is 20.19 kg/m².     Physical Exam  Constitutional:       General: She is not in acute distress.     Appearance: She is not ill-appearing.   HENT:      Head: Normocephalic and atraumatic.      Right Ear: External ear normal.      Left Ear: External ear normal.      Nose: Nose normal. No congestion or rhinorrhea.      Mouth/Throat:      Mouth: Mucous membranes are moist.      Pharynx: Oropharynx is clear. No oropharyngeal exudate or posterior oropharyngeal erythema.   Eyes:      Extraocular Movements: Extraocular movements intact.      Conjunctiva/sclera: Conjunctivae normal.      Pupils: Pupils are equal, round, and reactive to light.   Cardiovascular:      Rate and Rhythm: Normal rate and regular rhythm.      Pulses: Normal pulses.      Heart sounds: No murmur heard.  Pulmonary:      Effort: Pulmonary effort is normal. No respiratory distress.      Breath sounds: Normal breath sounds. No wheezing.   Chest:      Chest wall: No tenderness.   Abdominal:      General: Bowel sounds are normal.      Palpations: Abdomen is soft.      Tenderness: There is no abdominal tenderness.   Musculoskeletal:         General: Normal range of motion.      Cervical back: Normal range of motion.   Skin:     General: Skin is warm and dry.      Capillary Refill: Capillary refill takes less than 2 seconds.      Findings: No rash.   Neurological:      General: No focal deficit present.      Mental Status: She is alert. Mental status is at baseline.         "

## 2023-12-20 NOTE — PROGRESS NOTES
HCC coding opportunities       Chart reviewed, no opportunity found: CHART REVIEWED, NO OPPORTUNITY FOUND        Patients Insurance     Medicare Insurance: [employer]vantage

## 2023-12-27 ENCOUNTER — APPOINTMENT (OUTPATIENT)
Dept: LAB | Facility: HOSPITAL | Age: 63
End: 2023-12-27
Payer: COMMERCIAL

## 2023-12-27 DIAGNOSIS — I10 PRIMARY HYPERTENSION: ICD-10-CM

## 2023-12-27 DIAGNOSIS — R63.0 POOR APPETITE: ICD-10-CM

## 2023-12-27 DIAGNOSIS — F41.9 ANXIETY: ICD-10-CM

## 2023-12-27 LAB
ALBUMIN SERPL BCP-MCNC: 4.4 G/DL (ref 3.5–5)
ALP SERPL-CCNC: 68 U/L (ref 34–104)
ALT SERPL W P-5'-P-CCNC: 14 U/L (ref 7–52)
ANION GAP SERPL CALCULATED.3IONS-SCNC: 8 MMOL/L
AST SERPL W P-5'-P-CCNC: 14 U/L (ref 13–39)
BILIRUB SERPL-MCNC: 0.72 MG/DL (ref 0.2–1)
BUN SERPL-MCNC: 15 MG/DL (ref 5–25)
CALCIUM SERPL-MCNC: 9.4 MG/DL (ref 8.4–10.2)
CHLORIDE SERPL-SCNC: 109 MMOL/L (ref 96–108)
CO2 SERPL-SCNC: 25 MMOL/L (ref 21–32)
CREAT SERPL-MCNC: 0.59 MG/DL (ref 0.6–1.3)
CREAT UR-MCNC: 25.1 MG/DL
GFR SERPL CREATININE-BSD FRML MDRD: 96 ML/MIN/1.73SQ M
GLUCOSE SERPL-MCNC: 97 MG/DL (ref 65–140)
MICROALBUMIN UR-MCNC: <7 MG/L
MICROALBUMIN/CREAT 24H UR: <28 MG/G CREATININE (ref 0–30)
POTASSIUM SERPL-SCNC: 3.6 MMOL/L (ref 3.5–5.3)
PROT SERPL-MCNC: 6.9 G/DL (ref 6.4–8.4)
SODIUM SERPL-SCNC: 142 MMOL/L (ref 135–147)
T4 FREE SERPL-MCNC: 1.01 NG/DL (ref 0.61–1.12)
TSH SERPL DL<=0.05 MIU/L-ACNC: 0.57 UIU/ML (ref 0.45–4.5)

## 2023-12-27 PROCEDURE — 84439 ASSAY OF FREE THYROXINE: CPT

## 2023-12-27 PROCEDURE — 82043 UR ALBUMIN QUANTITATIVE: CPT

## 2023-12-27 PROCEDURE — 36415 COLL VENOUS BLD VENIPUNCTURE: CPT

## 2023-12-27 PROCEDURE — 84443 ASSAY THYROID STIM HORMONE: CPT

## 2023-12-27 PROCEDURE — 80053 COMPREHEN METABOLIC PANEL: CPT

## 2023-12-27 PROCEDURE — 82570 ASSAY OF URINE CREATININE: CPT

## 2024-01-07 DIAGNOSIS — F41.9 ANXIETY: ICD-10-CM

## 2024-01-08 RX ORDER — HYDROXYZINE HYDROCHLORIDE 10 MG/1
10 TABLET, FILM COATED ORAL EVERY 6 HOURS PRN
Qty: 90 TABLET | Refills: 0 | Status: SHIPPED | OUTPATIENT
Start: 2024-01-08

## 2024-01-10 ENCOUNTER — TELEPHONE (OUTPATIENT)
Dept: FAMILY MEDICINE CLINIC | Facility: CLINIC | Age: 64
End: 2024-01-10

## 2024-01-10 NOTE — TELEPHONE ENCOUNTER
Fax notification from [employer] -  hydrOXYzine HCL (ATARAX) 10 mg tablet requires prior auth    KEY : [id number]    Proceed with PA?    Please advise

## 2024-01-10 NOTE — TELEPHONE ENCOUNTER
PA submitted through [employer] portal and approved. Please notify jass Christiansen (Ibarra: [email or web address])  PA Case ID #: [id number]  Rx #: [id number]  Need Help? Call us at [phone]  Outcome  Approved today  CaseId:[id number];Status:Approved;Review Type:Prior Auth;Coverage Start Date:12/11/2023;Coverage End Date:01/09/2025;

## 2024-01-13 DIAGNOSIS — K92.2 GI BLEED: ICD-10-CM

## 2024-01-13 DIAGNOSIS — K92.1 MELENA: ICD-10-CM

## 2024-01-13 DIAGNOSIS — R10.9 ABDOMINAL PAIN: ICD-10-CM

## 2024-01-15 RX ORDER — PANTOPRAZOLE SODIUM 20 MG/1
20 TABLET, DELAYED RELEASE ORAL DAILY
Qty: 90 TABLET | Refills: 1 | Status: SHIPPED | OUTPATIENT
Start: 2024-01-15

## 2024-01-28 DIAGNOSIS — R63.0 POOR APPETITE: ICD-10-CM

## 2024-01-28 DIAGNOSIS — F41.9 ANXIETY: ICD-10-CM

## 2024-01-29 RX ORDER — BUPROPION HYDROCHLORIDE 150 MG/1
150 TABLET ORAL EVERY MORNING
Qty: 90 TABLET | Refills: 1 | Status: SHIPPED | OUTPATIENT
Start: 2024-01-29

## 2024-02-22 ENCOUNTER — OFFICE VISIT (OUTPATIENT)
Dept: FAMILY MEDICINE CLINIC | Facility: CLINIC | Age: 64
End: 2024-02-22
Payer: COMMERCIAL

## 2024-02-22 VITALS
DIASTOLIC BLOOD PRESSURE: 88 MMHG | HEART RATE: 60 BPM | RESPIRATION RATE: 12 BRPM | TEMPERATURE: 97 F | OXYGEN SATURATION: 96 % | BODY MASS INDEX: 20.2 KG/M2 | HEIGHT: 63 IN | WEIGHT: 114 LBS | SYSTOLIC BLOOD PRESSURE: 130 MMHG

## 2024-02-22 DIAGNOSIS — E46 PROTEIN-CALORIE MALNUTRITION, UNSPECIFIED SEVERITY (HCC): ICD-10-CM

## 2024-02-22 DIAGNOSIS — R63.4 WEIGHT LOSS: ICD-10-CM

## 2024-02-22 DIAGNOSIS — F41.9 ANXIETY: Primary | ICD-10-CM

## 2024-02-22 DIAGNOSIS — I10 PRIMARY HYPERTENSION: ICD-10-CM

## 2024-02-22 DIAGNOSIS — F33.9 DEPRESSION, RECURRENT (HCC): ICD-10-CM

## 2024-02-22 PROCEDURE — 99214 OFFICE O/P EST MOD 30 MIN: CPT | Performed by: STUDENT IN AN ORGANIZED HEALTH CARE EDUCATION/TRAINING PROGRAM

## 2024-02-22 NOTE — PROGRESS NOTES
Assessment/Plan:         Problem List Items Addressed This Visit        Cardiovascular and Mediastinum    Primary hypertension       Other    Anxiety - Primary    Depression, recurrent (HCC)   Other Visit Diagnoses     Weight loss        Relevant Orders    Comprehensive metabolic panel    TSH + Free T4    CBC and differential    Iron Panel (Includes Ferritin, Iron Sat%, Iron, and TIBC)    Protein-calorie malnutrition, unspecified severity (HCC)        Relevant Orders    Iron Panel (Includes Ferritin, Iron Sat%, Iron, and TIBC)          BP improved, mental health improving. Do expect weight to improve slowly now that she is doing better psychologically     Reviewed     Subjective:      Patient ID: Debra Christiansen is a 63 y.o. female.    HPI      Psychologically feeling much better, trying to eat more. Afrasid to weight herself    Wants to check electroytes    The following portions of the patient's history were reviewed and updated as appropriate:   Past Medical History:  She has a past medical history of Anemia, Aortic valve disease, Breast cancer (HCC), Cardiac murmur, and Hyperlipidemia.,  _______________________________________________________________________  Medical Problems:  does not have any pertinent problems on file.,  _______________________________________________________________________  Past Surgical History:   has a past surgical history that includes Breast surgery; pr esophagogastroduodenoscopy transoral diagnostic (N/A, 2/19/2019); and Colonoscopy.,  _______________________________________________________________________  Family History:  family history includes Hypertension in her father; No Known Problems in her mother.,  _______________________________________________________________________  Social History:   reports that she has never smoked. She has never used smokeless tobacco. She reports that she does not currently use alcohol. She reports that she does not use  "drugs.,  _______________________________________________________________________  Allergies:  is allergic to lisinopril..  _______________________________________________________________________  Current Outpatient Medications   Medication Sig Dispense Refill   • buPROPion (WELLBUTRIN XL) 150 mg 24 hr tablet TAKE 1 TABLET BY MOUTH EVERY DAY IN THE MORNING 90 tablet 1   • Cholecalciferol (VITAMIN D3) 2000 units capsule Take 1 capsule by mouth daily     • hydrOXYzine HCL (ATARAX) 10 mg tablet TAKE 1 TABLET BY MOUTH EVERY 6 HOURS AS NEEDED FOR ANXIETY. 90 tablet 0   • Omega-3 Fatty Acids (FISH OIL) 1,000 mg Take 1,000 mg by mouth daily     • pantoprazole (PROTONIX) 20 mg tablet TAKE 1 TABLET BY MOUTH EVERY DAY 90 tablet 1   • aspirin (Aspirin 81) 81 mg EC tablet Take 1 tablet (81 mg total) by mouth daily (Patient not taking: Reported on 10/23/2023) 90 tablet 3     No current facility-administered medications for this visit.     _______________________________________________________________________  Review of Systems   Constitutional:  Negative for activity change, appetite change, fatigue and fever.   HENT:  Negative for congestion, rhinorrhea and sore throat.    Eyes:  Negative for visual disturbance.   Respiratory:  Negative for cough and shortness of breath.    Cardiovascular:  Negative for chest pain.   Gastrointestinal:  Negative for nausea and vomiting.         Objective:  Vitals:    02/22/24 1020 02/22/24 1039   BP: 160/100 130/88   BP Location: Left arm Left arm   Patient Position:  Sitting   Pulse: 60    Resp: 12    Temp: (!) 97 °F (36.1 °C)    SpO2: 96%    Weight: 51.7 kg (114 lb)    Height: 5' 3\" (1.6 m)      Body mass index is 20.19 kg/m².     Physical Exam  Constitutional:       General: She is not in acute distress.     Appearance: Normal appearance. She is not ill-appearing.   HENT:      Head: Normocephalic and atraumatic.      Right Ear: External ear normal.      Left Ear: External ear normal.      " Nose: No congestion or rhinorrhea.      Mouth/Throat:      Pharynx: No oropharyngeal exudate or posterior oropharyngeal erythema.   Cardiovascular:      Rate and Rhythm: Normal rate.   Pulmonary:      Effort: Pulmonary effort is normal. No respiratory distress.   Musculoskeletal:      Right shoulder: Tenderness and crepitus present.      Cervical back: Normal range of motion.   Neurological:      Mental Status: She is alert.   Psychiatric:         Attention and Perception: Attention and perception normal.         Mood and Affect: Mood normal. Affect is tearful.

## 2024-02-27 ENCOUNTER — APPOINTMENT (OUTPATIENT)
Dept: LAB | Facility: HOSPITAL | Age: 64
End: 2024-02-27
Payer: COMMERCIAL

## 2024-02-27 DIAGNOSIS — R63.4 WEIGHT LOSS: Primary | ICD-10-CM

## 2024-02-27 DIAGNOSIS — E46 PROTEIN-CALORIE MALNUTRITION, UNSPECIFIED SEVERITY (HCC): ICD-10-CM

## 2024-02-27 LAB
ALBUMIN SERPL BCP-MCNC: 4.6 G/DL (ref 3.5–5)
ALP SERPL-CCNC: 74 U/L (ref 34–104)
ALT SERPL W P-5'-P-CCNC: 24 U/L (ref 7–52)
ANION GAP SERPL CALCULATED.3IONS-SCNC: 7 MMOL/L
AST SERPL W P-5'-P-CCNC: 20 U/L (ref 13–39)
BASOPHILS # BLD AUTO: 0.01 THOUSANDS/ÂΜL (ref 0–0.1)
BASOPHILS NFR BLD AUTO: 0 % (ref 0–1)
BILIRUB SERPL-MCNC: 0.95 MG/DL (ref 0.2–1)
BUN SERPL-MCNC: 12 MG/DL (ref 5–25)
CALCIUM SERPL-MCNC: 9.7 MG/DL (ref 8.4–10.2)
CHLORIDE SERPL-SCNC: 106 MMOL/L (ref 96–108)
CO2 SERPL-SCNC: 30 MMOL/L (ref 21–32)
CREAT SERPL-MCNC: 0.65 MG/DL (ref 0.6–1.3)
EOSINOPHIL # BLD AUTO: 0.03 THOUSAND/ÂΜL (ref 0–0.61)
EOSINOPHIL NFR BLD AUTO: 1 % (ref 0–6)
ERYTHROCYTE [DISTWIDTH] IN BLOOD BY AUTOMATED COUNT: 13.1 % (ref 11.6–15.1)
FERRITIN SERPL-MCNC: 109 NG/ML (ref 11–307)
GFR SERPL CREATININE-BSD FRML MDRD: 93 ML/MIN/1.73SQ M
GLUCOSE P FAST SERPL-MCNC: 98 MG/DL (ref 65–99)
HCT VFR BLD AUTO: 42.4 % (ref 34.8–46.1)
HGB BLD-MCNC: 14.1 G/DL (ref 11.5–15.4)
IMM GRANULOCYTES # BLD AUTO: 0.01 THOUSAND/UL (ref 0–0.2)
IMM GRANULOCYTES NFR BLD AUTO: 0 % (ref 0–2)
IRON SATN MFR SERPL: 43 % (ref 15–50)
IRON SERPL-MCNC: 135 UG/DL (ref 50–212)
LYMPHOCYTES # BLD AUTO: 1.52 THOUSANDS/ÂΜL (ref 0.6–4.47)
LYMPHOCYTES NFR BLD AUTO: 34 % (ref 14–44)
MCH RBC QN AUTO: 30.5 PG (ref 26.8–34.3)
MCHC RBC AUTO-ENTMCNC: 33.3 G/DL (ref 31.4–37.4)
MCV RBC AUTO: 92 FL (ref 82–98)
MONOCYTES # BLD AUTO: 0.19 THOUSAND/ÂΜL (ref 0.17–1.22)
MONOCYTES NFR BLD AUTO: 4 % (ref 4–12)
NEUTROPHILS # BLD AUTO: 2.74 THOUSANDS/ÂΜL (ref 1.85–7.62)
NEUTS SEG NFR BLD AUTO: 61 % (ref 43–75)
NRBC BLD AUTO-RTO: 0 /100 WBCS
PLATELET # BLD AUTO: 251 THOUSANDS/UL (ref 149–390)
PMV BLD AUTO: 8.8 FL (ref 8.9–12.7)
POTASSIUM SERPL-SCNC: 3.7 MMOL/L (ref 3.5–5.3)
PROT SERPL-MCNC: 7.1 G/DL (ref 6.4–8.4)
RBC # BLD AUTO: 4.62 MILLION/UL (ref 3.81–5.12)
SODIUM SERPL-SCNC: 143 MMOL/L (ref 135–147)
T4 FREE SERPL-MCNC: 0.92 NG/DL (ref 0.61–1.12)
TIBC SERPL-MCNC: 314 UG/DL (ref 250–450)
TSH SERPL DL<=0.05 MIU/L-ACNC: 1.27 UIU/ML (ref 0.45–4.5)
UIBC SERPL-MCNC: 179 UG/DL (ref 155–355)
WBC # BLD AUTO: 4.5 THOUSAND/UL (ref 4.31–10.16)

## 2024-02-27 PROCEDURE — 83550 IRON BINDING TEST: CPT

## 2024-02-27 PROCEDURE — 36415 COLL VENOUS BLD VENIPUNCTURE: CPT

## 2024-02-27 PROCEDURE — 85025 COMPLETE CBC W/AUTO DIFF WBC: CPT

## 2024-02-27 PROCEDURE — 80053 COMPREHEN METABOLIC PANEL: CPT

## 2024-02-27 PROCEDURE — 83540 ASSAY OF IRON: CPT

## 2024-02-27 PROCEDURE — 84443 ASSAY THYROID STIM HORMONE: CPT

## 2024-02-27 PROCEDURE — 82728 ASSAY OF FERRITIN: CPT

## 2024-02-27 PROCEDURE — 84439 ASSAY OF FREE THYROXINE: CPT

## 2024-05-15 ENCOUNTER — RA CDI HCC (OUTPATIENT)
Dept: OTHER | Facility: HOSPITAL | Age: 64
End: 2024-05-15

## 2024-05-23 ENCOUNTER — OFFICE VISIT (OUTPATIENT)
Dept: FAMILY MEDICINE CLINIC | Facility: CLINIC | Age: 64
End: 2024-05-23
Payer: COMMERCIAL

## 2024-05-23 VITALS
RESPIRATION RATE: 12 BRPM | TEMPERATURE: 97.7 F | BODY MASS INDEX: 20.55 KG/M2 | WEIGHT: 116 LBS | HEIGHT: 63 IN | SYSTOLIC BLOOD PRESSURE: 110 MMHG | HEART RATE: 64 BPM | DIASTOLIC BLOOD PRESSURE: 80 MMHG | OXYGEN SATURATION: 99 %

## 2024-05-23 DIAGNOSIS — F33.9 DEPRESSION, RECURRENT (HCC): ICD-10-CM

## 2024-05-23 DIAGNOSIS — F51.05 INSOMNIA DUE TO OTHER MENTAL DISORDER: ICD-10-CM

## 2024-05-23 DIAGNOSIS — I10 PRIMARY HYPERTENSION: ICD-10-CM

## 2024-05-23 DIAGNOSIS — F41.9 ANXIETY: Primary | ICD-10-CM

## 2024-05-23 DIAGNOSIS — F99 INSOMNIA DUE TO OTHER MENTAL DISORDER: ICD-10-CM

## 2024-05-23 PROBLEM — R10.9 ABDOMINAL PAIN: Status: RESOLVED | Noted: 2023-09-24 | Resolved: 2024-05-23

## 2024-05-23 PROBLEM — R07.9 CHEST PAIN: Status: RESOLVED | Noted: 2023-09-24 | Resolved: 2024-05-23

## 2024-05-23 PROCEDURE — 99214 OFFICE O/P EST MOD 30 MIN: CPT | Performed by: STUDENT IN AN ORGANIZED HEALTH CARE EDUCATION/TRAINING PROGRAM

## 2024-05-23 PROCEDURE — G2211 COMPLEX E/M VISIT ADD ON: HCPCS | Performed by: STUDENT IN AN ORGANIZED HEALTH CARE EDUCATION/TRAINING PROGRAM

## 2024-05-23 NOTE — PROGRESS NOTES
Assessment/Plan:         Problem List Items Addressed This Visit        Cardiovascular and Mediastinum    Primary hypertension     Stable off medication             Behavioral Health    Anxiety - Primary     Improving          Depression, recurrent (HCC)     Improving         Other Visit Diagnoses     Insomnia due to other mental disorder              Start wellbutrin weaning. Take everry other day for 2 weeks, then every 2 days for two weeks.     Continue melatonin, cna increase to 10mg    Subjective:      Patient ID: Debra Christiansen is a 63 y.o. female.    HPI    Doing better psychologically. Some anxiety and feels it in her stomach area. Not using hydroxyzine- getting dry mouth.      Seen by cardio for these attacks, had normal hotler and EKG told it was related to anxiety    Sleep improving, takes melatonin. This has worsened since susannend has been ill the last 2 years      The following portions of the patient's history were reviewed and updated as appropriate:   Past Medical History:  She has a past medical history of Anemia, Aortic valve disease, Breast cancer (HCC), Cardiac murmur, and Hyperlipidemia.,  _______________________________________________________________________  Medical Problems:  does not have any pertinent problems on file.,  _______________________________________________________________________  Past Surgical History:   has a past surgical history that includes Breast surgery; pr esophagogastroduodenoscopy transoral diagnostic (N/A, 2/19/2019); and Colonoscopy.,  _______________________________________________________________________  Family History:  family history includes Hypertension in her father; No Known Problems in her mother.,  _______________________________________________________________________  Social History:   reports that she has never smoked. She has never used smokeless tobacco. She reports that she does not currently use alcohol. She reports that she does not use  "drugs.,  _______________________________________________________________________  Allergies:  is allergic to lisinopril..  _______________________________________________________________________  Current Outpatient Medications   Medication Sig Dispense Refill   • buPROPion (WELLBUTRIN XL) 150 mg 24 hr tablet TAKE 1 TABLET BY MOUTH EVERY DAY IN THE MORNING 90 tablet 1   • hydrOXYzine HCL (ATARAX) 10 mg tablet TAKE 1 TABLET BY MOUTH EVERY 6 HOURS AS NEEDED FOR ANXIETY. 90 tablet 0   • Omega-3 Fatty Acids (FISH OIL) 1,000 mg Take 1,000 mg by mouth daily     • pantoprazole (PROTONIX) 20 mg tablet TAKE 1 TABLET BY MOUTH EVERY DAY 90 tablet 1   • aspirin (Aspirin 81) 81 mg EC tablet Take 1 tablet (81 mg total) by mouth daily (Patient not taking: Reported on 10/23/2023) 90 tablet 3   • Cholecalciferol (VITAMIN D3) 2000 units capsule Take 1 capsule by mouth daily       No current facility-administered medications for this visit.     _______________________________________________________________________  Review of Systems   Constitutional:  Negative for chills, fatigue and fever.   HENT:  Negative for rhinorrhea and sore throat.    Eyes:  Negative for visual disturbance.   Respiratory:  Negative for cough and shortness of breath.    Cardiovascular:  Negative for chest pain and palpitations.   Gastrointestinal:  Negative for abdominal pain, constipation, diarrhea, nausea and vomiting.   Genitourinary:  Negative for difficulty urinating, dysuria and frequency.   Musculoskeletal:  Negative for arthralgias and myalgias.   Skin:  Negative for color change and rash.   Neurological:  Negative for weakness and headaches.         Objective:  Vitals:    05/23/24 1018   BP: 110/80   Pulse: 64   Resp: 12   Temp: 97.7 °F (36.5 °C)   SpO2: 99%   Weight: 52.6 kg (116 lb)   Height: 5' 3\" (1.6 m)     Body mass index is 20.55 kg/m².     Physical Exam  Constitutional:       General: She is not in acute distress.     Appearance: Normal " appearance. She is not ill-appearing.   HENT:      Head: Normocephalic and atraumatic.   Cardiovascular:      Rate and Rhythm: Normal rate.   Pulmonary:      Effort: Pulmonary effort is normal. No respiratory distress.   Neurological:      Mental Status: She is alert.   Psychiatric:         Mood and Affect: Mood normal.         Behavior: Behavior normal.         Thought Content: Thought content normal.

## 2024-06-18 ENCOUNTER — NURSE TRIAGE (OUTPATIENT)
Age: 64
End: 2024-06-18

## 2024-06-18 NOTE — TELEPHONE ENCOUNTER
"Reason for Disposition   Patient sounds very sick or weak to the triager    Answer Assessment - Initial Assessment Questions  1. SYMPTOM: \"What is the main symptom you are concerned about?\" (e.g., weakness, numbness)      Weakness and shaking in both arms and hands  2. ONSET: \"When did this start?\" (minutes, hours, days; while sleeping)      Patient states it started early in the morning after taking the medication, patient states the shaking started yesterday but it has gotten worse today.    3. LAST NORMAL: \"When was the last time you were normal (no symptoms)?\"      Saturday, 06/15/2024  4. PATTERN \"Does this come and go, or has it been constant since it started?\"  \"Is it present now?\"      constant  5. CARDIAC SYMPTOMS: \"Have you had any of the following symptoms: chest pain, difficulty breathing, palpitations?\"      denies  6. NEUROLOGIC SYMPTOMS: \"Have you had any of the following symptoms: headache, dizziness, vision loss, double vision, changes in speech, unsteady on your feet?\"      Vision loss  7. OTHER SYMPTOMS: \"Do you have any other symptoms?\"      Sweating, no energy    Protocols used: Neurologic Deficit-ADULT-[place]    "

## 2024-06-18 NOTE — TELEPHONE ENCOUNTER
Patient called states that she would like to schedule an appointment with PCP due to hand shaking, no energy, and sweating. Patient states she is unable to hold objects as her hands are shaking so much, states she is experiencing weakness in both hands. Patient states she believes the shaking is due to starting new medication that PCP prescribed.     RN advised patient, per protocol, she should be seen in the ED. Inquired if she has anyone to drive her.     Patient states no, she doesn't have someone to drive her to the ED.     RN offered to call EMS for patient.     Patient declined, states she needs to take her spouse to dialysis tomorrow. States she would like a call back from PCP to discuss.       Please advise.

## 2024-06-19 ENCOUNTER — OFFICE VISIT (OUTPATIENT)
Dept: FAMILY MEDICINE CLINIC | Facility: CLINIC | Age: 64
End: 2024-06-19
Payer: COMMERCIAL

## 2024-06-19 VITALS
SYSTOLIC BLOOD PRESSURE: 118 MMHG | DIASTOLIC BLOOD PRESSURE: 74 MMHG | OXYGEN SATURATION: 98 % | WEIGHT: 115 LBS | HEIGHT: 63 IN | BODY MASS INDEX: 20.38 KG/M2 | TEMPERATURE: 96 F | HEART RATE: 61 BPM

## 2024-06-19 DIAGNOSIS — F41.9 ANXIETY: ICD-10-CM

## 2024-06-19 DIAGNOSIS — R25.1 SHAKING: ICD-10-CM

## 2024-06-19 DIAGNOSIS — F33.9 DEPRESSION, RECURRENT (HCC): Primary | ICD-10-CM

## 2024-06-19 PROCEDURE — G2211 COMPLEX E/M VISIT ADD ON: HCPCS | Performed by: STUDENT IN AN ORGANIZED HEALTH CARE EDUCATION/TRAINING PROGRAM

## 2024-06-19 PROCEDURE — 99214 OFFICE O/P EST MOD 30 MIN: CPT | Performed by: STUDENT IN AN ORGANIZED HEALTH CARE EDUCATION/TRAINING PROGRAM

## 2024-06-19 RX ORDER — HYDROXYZINE HYDROCHLORIDE 10 MG/1
10 TABLET, FILM COATED ORAL EVERY 6 HOURS PRN
Qty: 90 TABLET | Refills: 0 | Status: SHIPPED | OUTPATIENT
Start: 2024-06-19

## 2024-06-19 NOTE — PROGRESS NOTES
Assessment/Plan:         Problem List Items Addressed This Visit        Behavioral Health    Anxiety    Relevant Medications    hydrOXYzine HCL (ATARAX) 10 mg tablet    Depression, recurrent (HCC) - Primary    Relevant Medications    hydrOXYzine HCL (ATARAX) 10 mg tablet   Other Visit Diagnoses     Shaking        Relevant Orders    Magnesium    Comprehensive metabolic panel    TSH + Free T4    CBC (Includes Diff/Plt) (Refl)        Maybe some muscle sore, unsure if patient is actuall taking Wellbutrin.  States that she is currently taking Wellbutrin we can wean off given anxiety has been very well-controlled.  Take every other day for the next week and can stop at that time    Subjective:      Patient ID: Debra Christiansen is a 63 y.o. female.    HPI    Yesterday arms felt tired like she was lfiting something very heavy. Felt low energy. When pouring a drink or holding a cup felt weaker in both arms. Felt very sore    Also noticed more diaphoresis yesterday but that resolved today.  States she was carrying a lot of groceries    Anisety stable as is depression    The following portions of the patient's history were reviewed and updated as appropriate:   Past Medical History:  She has a past medical history of Anemia, Aortic valve disease, Breast cancer (HCC), Cardiac murmur, and Hyperlipidemia.,  _______________________________________________________________________  Medical Problems:  does not have any pertinent problems on file.,  _______________________________________________________________________  Past Surgical History:   has a past surgical history that includes Breast surgery; pr esophagogastroduodenoscopy transoral diagnostic (N/A, 2/19/2019); and Colonoscopy.,  _______________________________________________________________________  Family History:  family history includes Hypertension in her father; No Known Problems in her mother.,  _______________________________________________________________________  Social  "History:   reports that she has never smoked. She has never used smokeless tobacco. She reports that she does not currently use alcohol. She reports that she does not use drugs.,  _______________________________________________________________________  Allergies:  is allergic to lisinopril..  _______________________________________________________________________  Current Outpatient Medications   Medication Sig Dispense Refill   • aspirin (Aspirin 81) 81 mg EC tablet Take 1 tablet (81 mg total) by mouth daily 90 tablet 3   • Cholecalciferol (VITAMIN D3) 2000 units capsule Take 1 capsule by mouth daily     • hydrOXYzine HCL (ATARAX) 10 mg tablet Take 1 tablet (10 mg total) by mouth every 6 (six) hours as needed for anxiety 90 tablet 0   • Omega-3 Fatty Acids (FISH OIL) 1,000 mg Take 1,000 mg by mouth daily     • pantoprazole (PROTONIX) 20 mg tablet TAKE 1 TABLET BY MOUTH EVERY DAY (Patient not taking: Reported on 6/19/2024) 90 tablet 1     No current facility-administered medications for this visit.     _______________________________________________________________________  Review of Systems   Constitutional:  Negative for activity change, appetite change, fatigue and fever.   HENT:  Negative for congestion, rhinorrhea and sore throat.    Eyes:  Negative for visual disturbance.   Respiratory:  Negative for cough and shortness of breath.    Cardiovascular:  Negative for chest pain.   Gastrointestinal:  Negative for nausea and vomiting.         Objective:  Vitals:    06/19/24 1225   BP: 118/74   Pulse: 61   Temp: (!) 96 °F (35.6 °C)   SpO2: 98%   Weight: 52.2 kg (115 lb)   Height: 5' 3\" (1.6 m)     Body mass index is 20.37 kg/m².     Physical Exam  Constitutional:       General: She is not in acute distress.     Appearance: She is not ill-appearing.   HENT:      Head: Normocephalic and atraumatic.      Right Ear: External ear normal.      Left Ear: External ear normal.      Nose: Nose normal. No congestion or " rhinorrhea.      Mouth/Throat:      Mouth: Mucous membranes are moist.      Pharynx: Oropharynx is clear. No oropharyngeal exudate or posterior oropharyngeal erythema.   Eyes:      Extraocular Movements: Extraocular movements intact.      Conjunctiva/sclera: Conjunctivae normal.      Pupils: Pupils are equal, round, and reactive to light.   Cardiovascular:      Rate and Rhythm: Normal rate and regular rhythm.      Pulses: Normal pulses.      Heart sounds: No murmur heard.  Pulmonary:      Effort: Pulmonary effort is normal. No respiratory distress.      Breath sounds: Normal breath sounds. No wheezing.   Chest:      Chest wall: No tenderness.   Abdominal:      General: Bowel sounds are normal.      Palpations: Abdomen is soft.      Tenderness: There is no abdominal tenderness.   Musculoskeletal:         General: Normal range of motion.      Cervical back: Normal range of motion.   Skin:     General: Skin is warm and dry.      Capillary Refill: Capillary refill takes less than 2 seconds.      Findings: No rash.   Neurological:      General: No focal deficit present.      Mental Status: She is alert. Mental status is at baseline.

## 2024-07-11 ENCOUNTER — OFFICE VISIT (OUTPATIENT)
Age: 64
End: 2024-07-11
Payer: COMMERCIAL

## 2024-07-11 VITALS
DIASTOLIC BLOOD PRESSURE: 82 MMHG | BODY MASS INDEX: 20.83 KG/M2 | WEIGHT: 113.2 LBS | HEIGHT: 62 IN | SYSTOLIC BLOOD PRESSURE: 160 MMHG

## 2024-07-11 DIAGNOSIS — M54.9 MUSCULOSKELETAL BACK PAIN: ICD-10-CM

## 2024-07-11 DIAGNOSIS — R53.83 FATIGUE, UNSPECIFIED TYPE: ICD-10-CM

## 2024-07-11 DIAGNOSIS — N95.1 MENOPAUSAL HOT FLUSHES: ICD-10-CM

## 2024-07-11 DIAGNOSIS — Z01.419 ENCOUNTER FOR GYNECOLOGICAL EXAMINATION WITHOUT ABNORMAL FINDING: Primary | ICD-10-CM

## 2024-07-11 DIAGNOSIS — M60.829: ICD-10-CM

## 2024-07-11 DIAGNOSIS — M81.6 LOCALIZED OSTEOPOROSIS (LEQUESNE): ICD-10-CM

## 2024-07-11 DIAGNOSIS — M85.80 OSTEOPENIA, UNSPECIFIED LOCATION: ICD-10-CM

## 2024-07-11 PROCEDURE — 99214 OFFICE O/P EST MOD 30 MIN: CPT | Performed by: OBSTETRICS & GYNECOLOGY

## 2024-07-11 RX ORDER — CELECOXIB 100 MG/1
100 CAPSULE ORAL DAILY
Qty: 30 CAPSULE | Refills: 0 | Status: SHIPPED | OUTPATIENT
Start: 2024-07-11 | End: 2024-08-10

## 2024-07-11 NOTE — PROGRESS NOTES
What we talked about today:    Patient Instructions    Under breast and sides pain:  likely musculoskeletal.  For 30 days of Celebrex.  (DO NOT TAKE MOTRIN while on this.)  Ok to massage with [place] balm to area daily.  New bras without underwire.    Check labs for menopausal symptoms.   For DEXA scan. Half jumping giovanny exercises and chair dips reviewed.   Wt is ok- you are perfect!!  Patient verbalized understanding of today's discussion with all questions and concerns addressed to her satisfaction.            Assessment/Plan:    1. Encounter for gynecological examination without abnormal finding  2. Fatigue, unspecified type  -     Vitamin D 25 hydroxy; Future  3. Menopausal hot flushes  -     Estrogens, total; Future  -     Progesterone; Future  -     Follicle stimulating hormone; Future  -     Testosterone; Future  4. Other myositis, unspecified upper arm  -     Vitamin D 25 hydroxy; Future  5. Musculoskeletal back pain  -     celecoxib (CeleBREX) 100 mg capsule; Take 1 capsule (100 mg total) by mouth daily  6. Osteopenia, unspecified location  -     DXA bone density spine hip and pelvis; Future; Expected date: 09/20/2024  7. Localized osteoporosis ([place])  -     DXA bone density spine hip and pelvis; Future; Expected date: 09/20/2024          Subjective:      Patient ID: Debra Christiansen is a 63 y.o. female, presents today complaining of f/u     HPI:    Pt states has been having mid axillary pain lateral to both breasts, not brought on by an activity.  Describes it as a pinching pain.      Mike is doing better.  Pt stopped taking Geeta's Wort about a month now .     Has not felt as bad as before now.        9/2023 CT showed:   FINDINGS:     ABDOMEN     LOWER CHEST: Mild atelectatic changes in the visualized lung bases.. No pleural effusions. Several surgical clips are seen in the right breast.     LIVER/BILIARY TREE:  Normal liver morphology.  No focal hepatic lesions.     No biliary ductal dilation.      GALLBLADDER: The the gallbladder is somewhat distended, however there is no gallbladder wall thickening or pericholecystic fluid to suggest acute cholecystitis. No calcified gallstones are seen.     SPLEEN: Unremarkable. A small soft tissue density nodule is seen adjacent to the spleen, most likely representing a splenule.     PANCREAS:  Unremarkable. Normal caliber main pancreatic duct.     ADRENAL GLANDS:  Unremarkable.     KIDNEYS/URETERS: Symmetric renal enhancement.  No intrarenal or ureteral calculi. No hydronephrosis.  No focal renal lesions.     STOMACH AND BOWEL:  Evaluation of the gastrointestinal tract is somewhat limited by underdistention and lack of oral contrast. The stomach is collapsed. No bowel obstruction or convincing inflammation.     APPENDIX: Normal appendix.     ABDOMINOPELVIC CAVITY:  No ascites or pneumoperitoneum.     LYMPH NODES: No abdominal or pelvic lymphadenopathy.     VESSELS: Normal caliber aorta. Patent major branch vessels.     PELVIS     REPRODUCTIVE ORGANS: The patient is status post hysterectomy.     URINARY BLADDER:  Unremarkable.     ABDOMINAL WALL/INGUINAL REGIONS: No ventral abdominal wall hernia.     OSSEOUS STRUCTURES:  No focal aggressive osseous lesions. Degenerative changes of the spine. Generalized osteopenia.     IMPRESSION:     1) No acute abdominal or pelvic pathology.     2) Normal appendix. Evaluation for bowel inflammation also somewhat limited by underdistention and lack of oral contrast, however no convincing inflammatory changes.  Please note mild inflammation may not be apparent.     3) No bowel obstruction.     4) Additional findings as above.       10/2023 MRI showed:     FINDINGS:     LOWER CHEST:   Unremarkable.     LIVER:  Liver is normal in size and contour.  No steatosis or evidence for iron overload. No gross mass lesions on this noncontrast study.  Limited evaluation of hepatic veins and portal veins on this non-contrast MRI is unremarkable.      BILE DUCTS:  No intrahepatic or extrahepatic bile duct dilation. Common bile duct is normal in caliber for age measuring 6 mm proximally and tapering distally.  No choledocholithiasis, biliary stricture, or suspicious mass.     GALLBLADDER:  Normal.     PANCREAS:  Unremarkable.     ADRENAL GLANDS:  Unremarkable.     SPLEEN:  Normal.     KIDNEYS/PROXIMAL URETERS: Renal collecting systems are decompressed. Tiny benign peripelvic cysts. No suspicious renal masses on either side. No perinephric collections.     BOWEL:  No dilated loops of bowel.     PERITONEAL CAVITY/RETROPERITONEUM: No ascites or encapsulated collections.     LYMPH NODES:  No abdominal lymphadenopathy.     VASCULAR STRUCTURES: Grossly unremarkable. No aneurysms evident.     ABDOMINAL WALL: Minuscule fat-containing umbilical hernia superimposed on mild diastasis recti.     OSSEOUS STRUCTURES: Multiple tiny, benign perineural ([place]arlov) cysts noted at the spine and sacrum.        IMPRESSION:     Biliary ducts are normal in caliber. No choledocholithiasis or evidence for obstructive cholangiopathy. Pseudo-distended appearance on prior ultrasound likely attributable to artifact from scanning angle and volume averaging, possibly at the confluence   of the hepatic ducts.     No acute abdominal findings.           The following portions of the patient's history were reviewed and updated as appropriate: allergies, current medications, past family history, past medical history, past social history, past surgical history, and problem list.      Review of Systems   Constitutional:  Negative for chills, fatigue and fever.   Respiratory: Negative.     Cardiovascular: Negative.    Gastrointestinal: Negative.    Endocrine: Negative.    Genitourinary: Negative.    Musculoskeletal: Negative.    Skin: Negative.    Allergic/Immunologic: Negative.    Neurological: Negative.    Hematological: Negative.    Psychiatric/Behavioral: Negative.               Objective:      BP  "160/82 (BP Location: Left arm, Patient Position: Sitting, Cuff Size: Child)   Ht 5' 2\" (1.575 m)   Wt 51.3 kg (113 lb 3.2 oz)   BMI 20.70 kg/m²        Physical Exam  Vitals reviewed.   Constitutional:       General: She is not in acute distress.     Appearance: Normal appearance. She is not ill-appearing.   HENT:      Head: Normocephalic and atraumatic.   Eyes:      Extraocular Movements: Extraocular movements intact.   Musculoskeletal:      Cervical back: Normal range of motion.   Skin:     General: Skin is warm and dry.   Neurological:      Mental Status: She is alert.   Psychiatric:         Mood and Affect: Mood normal.         Behavior: Behavior normal.         Thought Content: Thought content normal.         Judgment: Judgment normal.           GYN exam: NEFG, No CMT, uterine, or adnexal tenderness to palpation.      Wetprep was not performed today.            POLLY Dawson MD, FACOG    "

## 2024-07-11 NOTE — PATIENT INSTRUCTIONS
Under breast and sides pain:  likely musculoskeletal.  For 30 days of Celebrex.  (DO NOT TAKE MOTRIN while on this.)  Ok to massage with [place] balm to area daily.  New bras without underwire.    Check labs for menopausal symptoms.   For DEXA scan. Half jumping giovanny exercises and chair dips reviewed.   Wt is ok- you are perfect!!  Patient verbalized understanding of today's discussion with all questions and concerns addressed to her satisfaction.

## 2024-07-16 NOTE — LETTER
Made 2nd attempt to call patient to relay provider recommendation regarding medication requested. However, no answer. Message left on vm request a return call to clinic with number provided.    Drake Peter RN  [facility]     Procedure Request Form: Mammogram      Date Requested: 21  Patient: Berta Euler  Patient : [birth year]   Referring Provider: Ba Avelar, MD        Date of Procedure ______________________________       The above patient has informed us that they have completed their   most recent Mammogram at your facility  Please complete   this form and attach all corresponding procedure reports/results  Comments __________________________________________________________  ____________________________________________________________________  ____________________________________________________________________  ____________________________________________________________________    Facility Completing Procedure _________________________________________    Form Completed By (print name) _______________________________________      Signature __________________________________________________________      These reports are needed for  compliance    Please fax this completed form and a copy of the procedure report to our office located at [place] [address] as soon as possible to [phone] attention Alcira: Phone [phone]    We thank you for your assistance in treating our mutual patient

## 2024-07-19 ENCOUNTER — APPOINTMENT (OUTPATIENT)
Dept: LAB | Facility: HOSPITAL | Age: 64
End: 2024-07-19
Payer: COMMERCIAL

## 2024-07-19 DIAGNOSIS — N95.1 MENOPAUSAL HOT FLUSHES: ICD-10-CM

## 2024-07-19 DIAGNOSIS — R53.83 FATIGUE, UNSPECIFIED TYPE: ICD-10-CM

## 2024-07-19 DIAGNOSIS — R25.1 SHAKING: ICD-10-CM

## 2024-07-19 DIAGNOSIS — M60.829: ICD-10-CM

## 2024-07-19 LAB
25(OH)D3 SERPL-MCNC: 49.4 NG/ML (ref 30–100)
ALBUMIN SERPL BCG-MCNC: 4.4 G/DL (ref 3.5–5)
ALP SERPL-CCNC: 90 U/L (ref 34–104)
ALT SERPL W P-5'-P-CCNC: 29 U/L (ref 7–52)
ANION GAP SERPL CALCULATED.3IONS-SCNC: 7 MMOL/L (ref 4–13)
AST SERPL W P-5'-P-CCNC: 22 U/L (ref 13–39)
BASOPHILS # BLD AUTO: 0.02 THOUSANDS/ÂΜL (ref 0–0.1)
BASOPHILS NFR BLD AUTO: 1 % (ref 0–1)
BILIRUB SERPL-MCNC: 0.9 MG/DL (ref 0.2–1)
BUN SERPL-MCNC: 13 MG/DL (ref 5–25)
CALCIUM SERPL-MCNC: 9.6 MG/DL (ref 8.4–10.2)
CHLORIDE SERPL-SCNC: 107 MMOL/L (ref 96–108)
CO2 SERPL-SCNC: 29 MMOL/L (ref 21–32)
CREAT SERPL-MCNC: 0.6 MG/DL (ref 0.6–1.3)
EOSINOPHIL # BLD AUTO: 0.04 THOUSAND/ÂΜL (ref 0–0.61)
EOSINOPHIL NFR BLD AUTO: 1 % (ref 0–6)
ERYTHROCYTE [DISTWIDTH] IN BLOOD BY AUTOMATED COUNT: 12.8 % (ref 11.6–15.1)
FSH SERPL-ACNC: 44.8 MIU/ML
GFR SERPL CREATININE-BSD FRML MDRD: 95 ML/MIN/1.73SQ M
GLUCOSE P FAST SERPL-MCNC: 89 MG/DL (ref 65–99)
HCT VFR BLD AUTO: 43.4 % (ref 34.8–46.1)
HGB BLD-MCNC: 14.3 G/DL (ref 11.5–15.4)
IMM GRANULOCYTES # BLD AUTO: 0.01 THOUSAND/UL (ref 0–0.2)
IMM GRANULOCYTES NFR BLD AUTO: 0 % (ref 0–2)
LYMPHOCYTES # BLD AUTO: 1.3 THOUSANDS/ÂΜL (ref 0.6–4.47)
LYMPHOCYTES NFR BLD AUTO: 33 % (ref 14–44)
MAGNESIUM SERPL-MCNC: 2.1 MG/DL (ref 1.9–2.7)
MCH RBC QN AUTO: 30.8 PG (ref 26.8–34.3)
MCHC RBC AUTO-ENTMCNC: 32.9 G/DL (ref 31.4–37.4)
MCV RBC AUTO: 93 FL (ref 82–98)
MONOCYTES # BLD AUTO: 0.28 THOUSAND/ÂΜL (ref 0.17–1.22)
MONOCYTES NFR BLD AUTO: 7 % (ref 4–12)
NEUTROPHILS # BLD AUTO: 2.32 THOUSANDS/ÂΜL (ref 1.85–7.62)
NEUTS SEG NFR BLD AUTO: 58 % (ref 43–75)
NRBC BLD AUTO-RTO: 0 /100 WBCS
PLATELET # BLD AUTO: 239 THOUSANDS/UL (ref 149–390)
PMV BLD AUTO: 9.1 FL (ref 8.9–12.7)
POTASSIUM SERPL-SCNC: 3.6 MMOL/L (ref 3.5–5.3)
PROGEST SERPL-MCNC: 0.26 NG/ML
PROT SERPL-MCNC: 7.3 G/DL (ref 6.4–8.4)
RBC # BLD AUTO: 4.65 MILLION/UL (ref 3.81–5.12)
SODIUM SERPL-SCNC: 143 MMOL/L (ref 135–147)
T4 FREE SERPL-MCNC: 0.75 NG/DL (ref 0.61–1.12)
TESTOST SERPL-MSCNC: 30 NG/DL
TSH SERPL DL<=0.05 MIU/L-ACNC: 1.2 UIU/ML (ref 0.45–4.5)
WBC # BLD AUTO: 3.97 THOUSAND/UL (ref 4.31–10.16)

## 2024-07-19 PROCEDURE — 36415 COLL VENOUS BLD VENIPUNCTURE: CPT

## 2024-07-19 PROCEDURE — 80053 COMPREHEN METABOLIC PANEL: CPT

## 2024-07-19 PROCEDURE — 83001 ASSAY OF GONADOTROPIN (FSH): CPT

## 2024-07-19 PROCEDURE — 84144 ASSAY OF PROGESTERONE: CPT

## 2024-07-19 PROCEDURE — 84403 ASSAY OF TOTAL TESTOSTERONE: CPT

## 2024-07-19 PROCEDURE — 84439 ASSAY OF FREE THYROXINE: CPT

## 2024-07-19 PROCEDURE — 82672 ASSAY OF ESTROGEN: CPT

## 2024-07-19 PROCEDURE — 83735 ASSAY OF MAGNESIUM: CPT

## 2024-07-19 PROCEDURE — 82306 VITAMIN D 25 HYDROXY: CPT

## 2024-07-19 PROCEDURE — 84443 ASSAY THYROID STIM HORMONE: CPT

## 2024-07-19 PROCEDURE — 85025 COMPLETE CBC W/AUTO DIFF WBC: CPT

## 2024-07-26 LAB — ESTROGEN SERPL-MCNC: 37 PG/ML (ref 40–244)

## 2024-08-23 ENCOUNTER — OFFICE VISIT (OUTPATIENT)
Age: 64
End: 2024-08-23
Payer: COMMERCIAL

## 2024-08-23 VITALS
DIASTOLIC BLOOD PRESSURE: 72 MMHG | HEIGHT: 62 IN | SYSTOLIC BLOOD PRESSURE: 122 MMHG | WEIGHT: 115.6 LBS | BODY MASS INDEX: 21.27 KG/M2 | OXYGEN SATURATION: 99 % | HEART RATE: 64 BPM

## 2024-08-23 DIAGNOSIS — R10.10 UPPER ABDOMINAL PAIN: Primary | ICD-10-CM

## 2024-08-23 PROCEDURE — 99213 OFFICE O/P EST LOW 20 MIN: CPT | Performed by: INTERNAL MEDICINE

## 2024-08-23 RX ORDER — DICYCLOMINE HCL 20 MG
10 TABLET ORAL 4 TIMES DAILY PRN
Qty: 60 TABLET | Refills: 5 | Status: SHIPPED | OUTPATIENT
Start: 2024-08-23

## 2024-08-23 NOTE — PROGRESS NOTES
[facility]      Chief Complaint: Abdominal pain    HPI:  Debra Christiansen is a 63 y.o.  female who presents with upper abdominal pain which comes and goes.  It is clearly anxiety related.  She is having significant issues because of her 's illness.  She has absolutely no alarm symptomatology.  She might have lost some weight but did not quantify this.  No other symptomatology.  No radiation of the pain.  It comes and goes.  Currently she has none..      Review of Systems:   Constitutional: No fever or chills, feels well, no tiredness, no recent weight gain or weight loss.   HENT: No complaints of earache, no hearing loss, no nosebleeds, no nasal discharge, no sore throat, no hoarseness.    Eyes: No complaints of eye pain, no red eyes, no discharge from eyes, no itchy eyes.  Cardiovascular: No complaints of slow heart rate, no fast heart rate, no chest pain, no palpitations, no leg claudication, no lower extremity edema.   Respiratory: No complaints of shortness of breath, no wheezing, no cough, no SOB on exertion, no orthopnea.   Gastrointestinal: As noted in HPI  Genitourinary: No complaints of dysuria, no incontinence, no hesitancy, no nocturia.   Musculoskeletal: No complaints of arthralgia, no myalgias, no joint swelling or stiffness, no limb pain or swelling.   Neurological: No complaints of headache, no confusion, no convulsions, no numbness or tingling, no dizziness or fainting, no limb weakness, no difficulty walking.    Skin: No complaints of skin rash or skin lesions, no itching, no skin wound, no dry skin.    Hematological/Lymphatic: No complaints of swollen glands, does not bleed easy.   Allergic/Immunologic: No immunocompromised state.  Endocrine:  No complaints of polyuria, no polydipsia.   Psychiatric/Behavioral: As per HPI      Historical Information   Past Medical History:   Diagnosis Date    Anemia     Aortic valve disease     Breast cancer (HCC)     Cardiac murmur      "Hyperlipidemia      Past Surgical History:   Procedure Laterality Date    BREAST SURGERY      lumpectomy    COLONOSCOPY      NC ESOPHAGOGASTRODUODENOSCOPY TRANSORAL DIAGNOSTIC N/A 2/19/2019    Procedure: ESOPHAGOGASTRODUODENOSCOPY (EGD);  Surgeon: All Louis MD;  Location: [facility];  Service: Gastroenterology     Social History   Social History     Substance and Sexual Activity   Alcohol Use Not Currently    Comment: social     Social History     Substance and Sexual Activity   Drug Use No     Social History     Tobacco Use   Smoking Status Never   Smokeless Tobacco Never     Family History   Problem Relation Age of Onset    No Known Problems Mother     Hypertension Father          Current Medications: has a current medication list which includes the following prescription(s): dicyclomine, aspirin, celecoxib, vitamin d3, hydroxyzine hcl, fish oil, and pantoprazole.        Vital Signs: /72   Pulse 64   Ht 5' 2\" (1.575 m)   Wt 52.4 kg (115 lb 9.6 oz)   SpO2 99%   BMI 21.14 kg/m²       Physical Exam:   Constitutional  General Appearance: No acute distress, well appearing and well nourished  Head  Normocephalic  Eyes  Conjunctivae and lids: No swelling, erythema, or discharge.    Pupils and irises: Equal, round and reactive to light.   Ears, Nose, Mouth, and Throat  External inspection of ears and nose: Normal  Nasal mucosa, septum and turbinates: Normal without edema or erythema/   Oropharynx: Normal with no erythema, edema, exudate or lesions.   Neck  Normal range of motion. Neck supple.   Cardiovascular  Auscultation of the heart: Normal rate and rhythm, normal S1 and S2 without murmurs.  Examination of the extremities for edema and/or varicosities: Normal  Pulmonary/Chest  Respiratory effort: No increased work of breathing or signs of respiratory distress.   Auscultation of lungs: Clear to auscultation, equal breath sounds bilaterally, no wheezes, rales, no rhonchi.   Abdomen  Abdomen: " Non-tender, no masses.   Liver and spleen: No hepatomegaly or splenomegaly.   Musculoskeletal  Gait and station: normal.  Digits and Nails: normal without clubbing or cyanosis.  Inspection/palpation of joints, bones, and muscles: Normal  Neurological  No nystagmus or asterixis.   Skin  Skin and subcutaneous tissue: Normal without rashes or lesions.   Lymphatic  Palpation of the lymph nodes in neck: No lymphadenopathy.   Psychiatric  Orientation to person, place and time: Normal.  Mood and affect: Normal.         Labs:  Lab Results   Component Value Date    ALT 29 07/19/2024    AST 22 07/19/2024    BUN 13 07/19/2024    CALCIUM 9.6 07/19/2024     07/19/2024    CHOL 216 (H) 03/22/2016    CO2 29 07/19/2024    CREATININE 0.60 07/19/2024    HDL 69 08/02/2023    HCT 43.4 07/19/2024    HGB 14.3 07/19/2024    HGBA1C 5.5 06/17/2022    MG 2.1 07/19/2024     07/19/2024    K 3.6 07/19/2024     03/22/2016    TRIG 74 08/02/2023    WBC 3.97 (L) 07/19/2024         X-Rays & Procedures:   No orders to display           ______________________________________________________________________      Assessment & Plan:     Diagnoses and all orders for this visit:    Upper abdominal pain  -     dicyclomine (BENTYL) 20 mg tablet; Take 0.5 tablets (10 mg total) by mouth 4 (four) times a day as needed (Abdominal pain)      Clearly this is a functional issue.  Patient is very tearful.  We will give a trial of dicyclomine and she will let me know her results.

## 2024-10-04 ENCOUNTER — TELEPHONE (OUTPATIENT)
Age: 64
End: 2024-10-04

## 2024-10-16 ENCOUNTER — RA CDI HCC (OUTPATIENT)
Dept: OTHER | Facility: HOSPITAL | Age: 64
End: 2024-10-16

## 2025-01-02 ENCOUNTER — TELEPHONE (OUTPATIENT)
Age: 65
End: 2025-01-02

## 2025-01-02 NOTE — TELEPHONE ENCOUNTER
Called patient to confirm lab work question.  She had them done in July.  Scheduled yearly appointment for February. Aware she needs to complete DXA scan.

## 2025-01-02 NOTE — TELEPHONE ENCOUNTER
Patient called stating she is to get labwork done but there is nothing scheduled. Patient would like to be notified when sent to lab.

## 2025-02-11 ENCOUNTER — ANNUAL EXAM (OUTPATIENT)
Age: 65
End: 2025-02-11
Payer: COMMERCIAL

## 2025-02-11 VITALS
BODY MASS INDEX: 21.9 KG/M2 | DIASTOLIC BLOOD PRESSURE: 76 MMHG | HEIGHT: 62 IN | WEIGHT: 119 LBS | SYSTOLIC BLOOD PRESSURE: 124 MMHG

## 2025-02-11 DIAGNOSIS — F41.9 ANXIETY: ICD-10-CM

## 2025-02-11 DIAGNOSIS — Z78.0 MENOPAUSE: ICD-10-CM

## 2025-02-11 DIAGNOSIS — E55.9 VITAMIN D DEFICIENCY: ICD-10-CM

## 2025-02-11 DIAGNOSIS — Z00.00 ROUTINE GENERAL MEDICAL EXAMINATION AT A HEALTH CARE FACILITY: Primary | ICD-10-CM

## 2025-02-11 PROCEDURE — G0101 CA SCREEN;PELVIC/BREAST EXAM: HCPCS | Performed by: OBSTETRICS & GYNECOLOGY

## 2025-02-11 RX ORDER — HYDROXYZINE HYDROCHLORIDE 10 MG/1
10 TABLET, FILM COATED ORAL EVERY 6 HOURS PRN
Qty: 60 TABLET | Refills: 4 | Status: SHIPPED | OUTPATIENT
Start: 2025-02-11 | End: 2025-03-11

## 2025-02-11 NOTE — PATIENT INSTRUCTIONS
Paps done indefinitely.  Mammogram up to date, due 10/2025 ([facility]).  Colon CA screening up to date 12/2024 (Dr. Rajput)  For DEXA scan.    Take 450mg Magnesium Glycinate daily.   10 half jumping giovanny exercises daily.   Ok for Hydroxyzine refill for anxiety.   See Dr. Gibran beal and upper body exercises.   Patient verbalized understanding of today's discussion with all questions and concerns addressed to her satisfaction.

## 2025-02-11 NOTE — PROGRESS NOTES
What we talked about today:    Patient Instructions   Paps done indefinitely.  Mammogram up to date, due 10/2025 ([facility]).  Colon CA screening up to date 2024 (Dr. Rajput)  For DEXA scan.    Take 450mg Magnesium Glycinate daily.   10 half jumping giovanny exercises daily.   Ok for Hydroxyzine refill for anxiety.   See Dr. Gibran beal and upper body exercises.   Patient verbalized understanding of today's discussion with all questions and concerns addressed to her satisfaction.            Assessment/Plan:    1. Routine general medical examination at a health care facility  -     CBC and Platelet; Future  -     Comprehensive metabolic panel; Future  -     Lipid panel; Future  -     TSH + Free T4; Future  -     Hemoglobin A1C; Future  2. Anxiety  -     TSH + Free T4; Future  3. Vitamin D deficiency  -     Vitamin D 25 hydroxy; Future  4. Menopause          Subjective:      Patient ID: Debra Christiansen is a 64 y.o. female, who presents for an annual exam today.     HPI:    Pt states still having some anxiety where Hydroxyzine does help.  C/o right upper back muscle pain.        From 2023 L[facility] note:   Seen  annual - 22Hysterectomy 17Last Pap / HPV - 17 WNL Mammogram - 16 ( Follows [facility] HX Of Breast Cancer ) CRC Scr - 23Dexa Scan - NoneCG: DXA / Mammo / DPS / FluChart Prep By: SAMY     GYN History:    No LMP recorded. Patient is postmenopausal.     OB History          1    Para   1    Term   1            AB        Living   1         SAB        IAB        Ectopic        Multiple        Live Births   1                  Annual exam Postmenopausal pap: 17. Pap: neg/HPV: N/A Hx of abnormal pap: no Hx of LEEP: no mammogram: 21; BIRADS 2 benign; (T-C Lifetime: not noted %) B/L breast US: 21; Birads 2 ebngin Colonoscopy: 2023; due        The following portions of the patient's history were reviewed and updated as appropriate: allergies,  "current medications, past family history, past medical history, past social history, past surgical history, and problem list.      Family history:      Family history   is not  positive for breast, uterine, ovarian cancer, colon cancer, or melanoma skin cancer.    Other family history of cancers:  No        Review of Systems   Constitutional: Negative.    HENT: Negative.     Eyes: Negative.    Respiratory: Negative.     Cardiovascular: Negative.    Gastrointestinal: Negative.    Endocrine: Negative.    Genitourinary: Negative.    Musculoskeletal: Negative.    Skin: Negative.    Allergic/Immunologic: Negative.    Neurological: Negative.    Hematological: Negative.    Psychiatric/Behavioral: Negative.     All other systems reviewed and are negative.            Objective:      /76   Ht 5' 2.25\" (1.581 m)   Wt 54 kg (119 lb)   BMI 21.59 kg/m²        Physical Exam  Constitutional:       General: She is not in acute distress.     Appearance: Normal appearance. She is not ill-appearing, toxic-appearing or diaphoretic.   HENT:      Head: Normocephalic and atraumatic.     Eyes:      Extraocular Movements: Extraocular movements intact.       Cardiovascular:      Rate and Rhythm: Normal rate and regular rhythm.   Pulmonary:      Effort: Pulmonary effort is normal.      Breath sounds: Normal breath sounds.   Abdominal:      General: Bowel sounds are normal.      Palpations: Abdomen is soft.     Musculoskeletal:      Cervical back: Neck supple.     Skin:     General: Skin is warm.     Neurological:      Mental Status: She is alert and oriented to person, place, and time.     Psychiatric:         Mood and Affect: Mood normal.         Behavior: Behavior normal.         Thought Content: Thought content normal.         Judgment: Judgment normal.           Cardiac:  murmur appreciated No    Breast exam: normal    GYN exam: NEFG, atrophic changes noted.  No bladder, CMT, uterine, or adnexal tenderness to palpation.  Urethra " appears normal    Pelvic floor:  3/5      Wetprep was not performed today.            R Rose Dawson MD, FACOG

## 2025-02-18 ENCOUNTER — APPOINTMENT (OUTPATIENT)
Dept: LAB | Facility: HOSPITAL | Age: 65
End: 2025-02-18
Payer: COMMERCIAL

## 2025-02-18 DIAGNOSIS — Z00.00 ROUTINE GENERAL MEDICAL EXAMINATION AT A HEALTH CARE FACILITY: Primary | ICD-10-CM

## 2025-02-18 DIAGNOSIS — F41.9 ANXIETY: ICD-10-CM

## 2025-02-18 DIAGNOSIS — E55.9 VITAMIN D DEFICIENCY: ICD-10-CM

## 2025-02-18 LAB
25(OH)D3 SERPL-MCNC: 36.1 NG/ML (ref 30–100)
ALBUMIN SERPL BCG-MCNC: 4.1 G/DL (ref 3.5–5)
ALP SERPL-CCNC: 89 U/L (ref 34–104)
ALT SERPL W P-5'-P-CCNC: 29 U/L (ref 7–52)
ANION GAP SERPL CALCULATED.3IONS-SCNC: 8 MMOL/L (ref 4–13)
AST SERPL W P-5'-P-CCNC: 23 U/L (ref 13–39)
BILIRUB SERPL-MCNC: 0.61 MG/DL (ref 0.2–1)
BUN SERPL-MCNC: 13 MG/DL (ref 5–25)
CALCIUM SERPL-MCNC: 9.4 MG/DL (ref 8.4–10.2)
CHLORIDE SERPL-SCNC: 107 MMOL/L (ref 96–108)
CHOLEST SERPL-MCNC: 220 MG/DL (ref ?–200)
CO2 SERPL-SCNC: 28 MMOL/L (ref 21–32)
CREAT SERPL-MCNC: 0.6 MG/DL (ref 0.6–1.3)
ERYTHROCYTE [DISTWIDTH] IN BLOOD BY AUTOMATED COUNT: 13.1 % (ref 11.6–15.1)
EST. AVERAGE GLUCOSE BLD GHB EST-MCNC: 117 MG/DL
GFR SERPL CREATININE-BSD FRML MDRD: 95 ML/MIN/1.73SQ M
GLUCOSE P FAST SERPL-MCNC: 98 MG/DL (ref 65–99)
HBA1C MFR BLD: 5.7 %
HCT VFR BLD AUTO: 42.1 % (ref 34.8–46.1)
HDLC SERPL-MCNC: 72 MG/DL
HGB BLD-MCNC: 13.6 G/DL (ref 11.5–15.4)
LDLC SERPL CALC-MCNC: 137 MG/DL (ref 0–100)
MCH RBC QN AUTO: 30.1 PG (ref 26.8–34.3)
MCHC RBC AUTO-ENTMCNC: 32.3 G/DL (ref 31.4–37.4)
MCV RBC AUTO: 93 FL (ref 82–98)
NONHDLC SERPL-MCNC: 148 MG/DL
PLATELET # BLD AUTO: 276 THOUSANDS/UL (ref 149–390)
PMV BLD AUTO: 9 FL (ref 8.9–12.7)
POTASSIUM SERPL-SCNC: 4.5 MMOL/L (ref 3.5–5.3)
PROT SERPL-MCNC: 6.8 G/DL (ref 6.4–8.4)
RBC # BLD AUTO: 4.52 MILLION/UL (ref 3.81–5.12)
SODIUM SERPL-SCNC: 143 MMOL/L (ref 135–147)
TRIGL SERPL-MCNC: 55 MG/DL (ref ?–150)
TSH SERPL DL<=0.05 MIU/L-ACNC: 1.62 UIU/ML (ref 0.45–4.5)
WBC # BLD AUTO: 4.09 THOUSAND/UL (ref 4.31–10.16)

## 2025-02-18 PROCEDURE — 80061 LIPID PANEL: CPT

## 2025-02-18 PROCEDURE — 83036 HEMOGLOBIN GLYCOSYLATED A1C: CPT

## 2025-02-18 PROCEDURE — 85027 COMPLETE CBC AUTOMATED: CPT

## 2025-02-18 PROCEDURE — 80053 COMPREHEN METABOLIC PANEL: CPT

## 2025-02-18 PROCEDURE — 84443 ASSAY THYROID STIM HORMONE: CPT

## 2025-02-18 PROCEDURE — 36415 COLL VENOUS BLD VENIPUNCTURE: CPT

## 2025-02-18 PROCEDURE — 82306 VITAMIN D 25 HYDROXY: CPT

## 2025-03-10 DIAGNOSIS — F41.9 ANXIETY: ICD-10-CM

## 2025-03-11 RX ORDER — HYDROXYZINE HYDROCHLORIDE 10 MG/1
10 TABLET, FILM COATED ORAL EVERY 6 HOURS PRN
Qty: 360 TABLET | Refills: 1 | Status: SHIPPED | OUTPATIENT
Start: 2025-03-11

## 2025-04-09 ENCOUNTER — DOCUMENTATION (OUTPATIENT)
Dept: ADMINISTRATIVE | Facility: OTHER | Age: 65
End: 2025-04-09

## 2025-04-09 NOTE — PROGRESS NOTES
04/09/25 9:37 AM    Annual Wellness Visit outreach is not required, patient has an upcoming appointment with the PCP office.    Thank you.  Latoya Saez MA  [facility]

## 2025-04-17 ENCOUNTER — OFFICE VISIT (OUTPATIENT)
Dept: FAMILY MEDICINE CLINIC | Facility: CLINIC | Age: 65
End: 2025-04-17
Payer: COMMERCIAL

## 2025-04-17 VITALS
SYSTOLIC BLOOD PRESSURE: 132 MMHG | WEIGHT: 121.6 LBS | TEMPERATURE: 97 F | DIASTOLIC BLOOD PRESSURE: 82 MMHG | OXYGEN SATURATION: 99 % | BODY MASS INDEX: 22.38 KG/M2 | HEIGHT: 62 IN | HEART RATE: 62 BPM | RESPIRATION RATE: 14 BRPM

## 2025-04-17 DIAGNOSIS — D17.1 LIPOMA OF TORSO: ICD-10-CM

## 2025-04-17 DIAGNOSIS — Z00.00 ENCOUNTER FOR ANNUAL WELLNESS VISIT (AWV) IN MEDICARE PATIENT: ICD-10-CM

## 2025-04-17 DIAGNOSIS — F41.9 ANXIETY: Primary | ICD-10-CM

## 2025-04-17 DIAGNOSIS — E78.00 ELEVATED CHOLESTEROL: ICD-10-CM

## 2025-04-17 DIAGNOSIS — Z78.0 ASYMPTOMATIC POSTMENOPAUSAL STATE: ICD-10-CM

## 2025-04-17 DIAGNOSIS — E78.5 HYPERLIPIDEMIA, UNSPECIFIED HYPERLIPIDEMIA TYPE: ICD-10-CM

## 2025-04-17 DIAGNOSIS — F33.9 DEPRESSION, RECURRENT (HCC): ICD-10-CM

## 2025-04-17 DIAGNOSIS — R73.09 ELEVATED HEMOGLOBIN A1C: ICD-10-CM

## 2025-04-17 DIAGNOSIS — I10 PRIMARY HYPERTENSION: ICD-10-CM

## 2025-04-17 PROCEDURE — G0439 PPPS, SUBSEQ VISIT: HCPCS | Performed by: STUDENT IN AN ORGANIZED HEALTH CARE EDUCATION/TRAINING PROGRAM

## 2025-04-17 PROCEDURE — 99214 OFFICE O/P EST MOD 30 MIN: CPT | Performed by: STUDENT IN AN ORGANIZED HEALTH CARE EDUCATION/TRAINING PROGRAM

## 2025-04-17 PROCEDURE — G2211 COMPLEX E/M VISIT ADD ON: HCPCS | Performed by: STUDENT IN AN ORGANIZED HEALTH CARE EDUCATION/TRAINING PROGRAM

## 2025-04-17 NOTE — PROGRESS NOTES
Name: Debra Christiansen      : 1960      MRN: [id number]  Encounter Provider: Fiona Gaxiola MD  Encounter Date: 2025   Encounter department: [place] [id number] [address]  :  Assessment & Plan       Preventive health issues were discussed with patient, and age appropriate screening tests were ordered as noted in patient's After Visit Summary. Personalized health advice and appropriate referrals for health education or preventive services given if needed, as noted in patient's After Visit Summary.    History of Present Illness     HPI   Patient Care Team:  Fiona Gaxiola MD as PCP - General (Family Medicine)  Fiona Gaxiola MD as PCP - [facility]CP-[facility] ([facility])  Mynor Devlin MD    Review of Systems  Medical History Reviewed by provider this encounter:       Annual Wellness Visit Questionnaire   Debra is here for her Subsequent Wellness visit.     Health Risk Assessment:   Patient rates overall health as very good. Patient feels that their physical health rating is same. Patient is very satisfied with their life. Eyesight was rated as same. Hearing was rated as same. Patient feels that their emotional and mental health rating is same. Patients states they are never, rarely angry. Patient states they are sometimes unusually tired/fatigued. Pain experienced in the last 7 days has been none. Patient states that she has experienced no weight loss or gain in last 6 months.     Depression Screening:   PHQ-9 Score: 0      Fall Risk Screening:   In the past year, patient has experienced: no history of falling in past year      Urinary Incontinence Screening:   Patient has not leaked urine accidently in the last six months.     Home Safety:  Patient does not have trouble with stairs inside or outside of their home. Patient has working smoke alarms and has working carbon monoxide detector. Home safety hazards include: none.     Nutrition:   Current diet is Regular.      Medications:   Patient is currently taking over-the-counter supplements. OTC medications include: see medication list. Patient is able to manage medications.     Activities of Daily Living (ADLs)/Instrumental Activities of Daily Living (IADLs):   Walk and transfer into and out of bed and chair?: Yes  Dress and groom yourself?: Yes    Bathe or shower yourself?: Yes    Feed yourself? Yes  Do your laundry/housekeeping?: Yes  Manage your money, pay your bills and track your expenses?: Yes  Make your own meals?: Yes    Do your own shopping?: Yes    Previous Hospitalizations:   Any hospitalizations or ED visits within the last 12 months?: No      Preventive Screenings      Cardiovascular Screening:    General: Screening Not Indicated and History Lipid Disorder      Diabetes Screening:     General: Screening Current      Colorectal Cancer Screening:     General: Screening Current      Breast Cancer Screening:     General: History Breast Cancer      Cervical Cancer Screening:    General: Screening Not Indicated      Osteoporosis Screening:    General: Screening Not Indicated and History Osteoporosis      Lung Cancer Screening:     General: Screening Not Indicated      Hepatitis C Screening:    General: Screening Current    Immunizations:  - Immunizations due: Influenza and Prevnar 20    Screening, Brief Intervention, and Referral to Treatment (SBIRT)     Screening      Single Item Drug Screening:  How often have you used an illegal drug (including marijuana) or a prescription medication for non-medical reasons in the past year? never    Single Item Drug Screen Score: 0  Interpretation: Negative screen for possible drug use disorder    Social Drivers of Health     Financial Resource Strain: Low Risk  (11/8/2023)    Overall Financial Resource Strain (CARDIA)     Difficulty of Paying Living Expenses: Not very hard   Food Insecurity: No Food Insecurity (9/25/2023)    Hunger Vital Sign     Worried About Running Out of Food  in the Last Year: Never true     Ran Out of Food in the Last Year: Never true   Transportation Needs: No Transportation Needs (11/8/2023)    PRAPARE - Transportation     Lack of Transportation (Medical): No     Lack of Transportation (Non-Medical): No   Housing Stability: Low Risk  (9/25/2023)    Housing Stability Vital Sign     Unable to Pay for Housing in the Last Year: No     Number of Times Moved in the Last Year: 1     Homeless in the Last Year: No     No results found.    Objective   There were no vitals taken for this visit.    Physical Exam

## 2025-04-17 NOTE — PROGRESS NOTES
Name: Debra Christiansen      : 1960      MRN: [id number]  Encounter Provider: Fiona Gaxiola MD  Encounter Date: 2025   Encounter department: [facility] [address] [place] [address]  :  Assessment & Plan  Anxiety  Continue hydroxyzine as needed       Elevated hemoglobin A1c  Diet and exercise encouraged  Orders:  •  Hemoglobin A1C; Future  •  Comprehensive metabolic panel; Future    Elevated cholesterol  Dietary changes recheck in 6 months  Orders:  •  Lipid panel; Future    Encounter for annual wellness visit (AWV) in Medicare patient         Asymptomatic postmenopausal state    Orders:  •  DXA bone density spine hip and pelvis; Future    Lipoma of torso  Discussed this is a benign finding       Hyperlipidemia, unspecified hyperlipidemia type  Discussed dietary changes       Depression, recurrent (HCC)  Stable         Primary hypertension  Better upon recheck work on diet and exercise           Depression Screening and Follow-up Plan: Patient was screened for depression during today's encounter. They screened negative with a PHQ-9 score of 0.        Preventive health issues were discussed with patient, and age appropriate screening tests were ordered as noted in patient's After Visit Summary. Personalized health advice and appropriate referrals for health education or preventive services given if needed, as noted in patient's After Visit Summary.    History of Present Illness     HPI     Follow-up chronic conditions.  Has not been following clean diet, has started swimming    Patient Care Team:  Fiona Gaxiola MD as PCP - General (Family Medicine)  Fiona Gaxiola MD as PCP - [facility]CP-[facility] ([facility])  Mynor Devlin MD    Review of Systems   Constitutional:  Negative for chills, fatigue and fever.   HENT:  Negative for rhinorrhea and sore throat.    Eyes:  Negative for visual disturbance.   Respiratory:  Negative for cough and shortness of breath.    Cardiovascular:  Negative  "for chest pain and palpitations.   Gastrointestinal:  Negative for abdominal pain, constipation, diarrhea, nausea and vomiting.   Genitourinary:  Negative for difficulty urinating, dysuria and frequency.   Musculoskeletal:  Negative for arthralgias and myalgias.   Skin:  Negative for color change and rash.   Neurological:  Negative for weakness and headaches.     Medical History Reviewed by provider this encounter:       Annual Wellness Visit Questionnaire       Depression Screening:   PHQ-9 Score: 0      Social Drivers of Health     Financial Resource Strain: Low Risk  (11/8/2023)    Overall Financial Resource Strain (CARDIA)    • Difficulty of Paying Living Expenses: Not very hard   Food Insecurity: No Food Insecurity (9/25/2023)    Hunger Vital Sign    • Worried About Running Out of Food in the Last Year: Never true    • Ran Out of Food in the Last Year: Never true   Transportation Needs: No Transportation Needs (11/8/2023)    PRAPARE - Transportation    • Lack of Transportation (Medical): No    • Lack of Transportation (Non-Medical): No   Housing Stability: Low Risk  (9/25/2023)    Housing Stability Vital Sign    • Unable to Pay for Housing in the Last Year: No    • Number of Times Moved in the Last Year: 1    • Homeless in the Last Year: No     No results found.    Objective   /82 (BP Location: Left arm)   Pulse 62   Temp (!) 97 °F (36.1 °C)   Resp 14   Ht 5' 2.25\" (1.581 m)   Wt 55.2 kg (121 lb 9.6 oz)   SpO2 99%   BMI 22.06 kg/m²     Physical Exam  Constitutional:       General: She is not in acute distress.     Appearance: Normal appearance. She is not ill-appearing.   HENT:      Head: Normocephalic and atraumatic.      Right Ear: Tympanic membrane, ear canal and external ear normal.      Left Ear: Tympanic membrane, ear canal and external ear normal.      Nose: Nose normal.      Mouth/Throat:      Mouth: Mucous membranes are moist.      Pharynx: Oropharynx is clear. No oropharyngeal exudate or " posterior oropharyngeal erythema.   Eyes:      General: No scleral icterus.        Right eye: No discharge.         Left eye: No discharge.      Extraocular Movements: Extraocular movements intact.      Conjunctiva/sclera: Conjunctivae normal.      Pupils: Pupils are equal, round, and reactive to light.   Cardiovascular:      Rate and Rhythm: Normal rate and regular rhythm.      Pulses: Normal pulses.      Heart sounds: Normal heart sounds. No murmur heard.  Pulmonary:      Effort: Pulmonary effort is normal. No respiratory distress.      Breath sounds: Normal breath sounds.   Abdominal:      General: Bowel sounds are normal.      Palpations: Abdomen is soft.      Tenderness: There is no abdominal tenderness.   Musculoskeletal:         General: Normal range of motion.      Cervical back: Normal range of motion and neck supple.   Lymphadenopathy:      Cervical: No cervical adenopathy.   Skin:     General: Skin is warm and dry.      Capillary Refill: Capillary refill takes less than 2 seconds.          Neurological:      General: No focal deficit present.      Mental Status: She is alert and oriented to person, place, and time. Mental status is at baseline.      Cranial Nerves: No cranial nerve deficit.   Psychiatric:         Mood and Affect: Mood normal.

## 2025-04-17 NOTE — PATIENT INSTRUCTIONS
Medicare Preventive Visit Patient Instructions  Thank you for completing your Welcome to Medicare Visit or Medicare Annual Wellness Visit today. Your next wellness visit will be due in one year (4/18/2026).  The screening/preventive services that you may require over the next 5-10 years are detailed below. Some tests may not apply to you based off risk factors and/or age. Screening tests ordered at today's visit but not completed yet may show as past due. Also, please note that scanned in results may not display below.  Preventive Screenings:  Service Recommendations Previous Testing/Comments   Colorectal Cancer Screening  * Colonoscopy    * Fecal Occult Blood Test (FOBT)/Fecal Immunochemical Test (FIT)  * Fecal DNA/Cologuard Test  * Flexible Sigmoidoscopy Age: 45-75 years old   Colonoscopy: every 10 years (may be performed more frequently if at higher risk)  OR  FOBT/FIT: every 1 year  OR  Cologuard: every 3 years  OR  Sigmoidoscopy: every 5 years  Screening may be recommended earlier than age 45 if at higher risk for colorectal cancer. Also, an individualized decision between you and your healthcare provider will decide whether screening between the ages of 76-85 would be appropriate. Colonoscopy: 12/05/2023  FOBT/FIT: 09/18/2023  Cologuard: Not on file  Sigmoidoscopy: Not on file    Screening Current     Breast Cancer Screening Age: 40+ years old  Frequency: every 1-2 years  Not required if history of left and right mastectomy Mammogram: 09/23/2021    History Breast Cancer   Cervical Cancer Screening Between the ages of 21-29, pap smear recommended once every 3 years.   Between the ages of 30-65, can perform pap smear with HPV co-testing every 5 years.   Recommendations may differ for women with a history of total hysterectomy, cervical cancer, or abnormal pap smears in past. Pap Smear: 04/26/2016    Screening Not Indicated   Hepatitis C Screening Once for adults born between 1945 and 1965  More frequently in  patients at high risk for Hepatitis C Hep C Antibody: 06/17/2022    Screening Current   Diabetes Screening 1-2 times per year if you're at risk for diabetes or have pre-diabetes Fasting glucose: 98 mg/dL (2/18/2025)  A1C: 5.7 % (2/18/2025)  Screening Current   Cholesterol Screening Once every 5 years if you don't have a lipid disorder. May order more often based on risk factors. Lipid panel: 02/18/2025    Screening Not Indicated  History Lipid Disorder     Other Preventive Screenings Covered by Medicare:  Abdominal Aortic Aneurysm (AAA) Screening: covered once if your at risk. You're considered to be at risk if you have a family history of AAA.  Lung Cancer Screening: covers low dose CT scan once per year if you meet all of the following conditions: (1) Age 55-77; (2) No signs or symptoms of lung cancer; (3) Current smoker or have quit smoking within the last 15 years; (4) You have a tobacco smoking history of at least 20 pack years (packs per day multiplied by number of years you smoked); (5) You get a written order from a healthcare provider.  Glaucoma Screening: covered annually if you're considered high risk: (1) You have diabetes OR (2) Family history of glaucoma OR (3)  aged 50 and older OR (4)  American aged 65 and older  Osteoporosis Screening: covered every 2 years if you meet one of the following conditions: (1) You're estrogen deficient and at risk for osteoporosis based off medical history and other findings; (2) Have a vertebral abnormality; (3) On glucocorticoid therapy for more than 3 months; (4) Have primary hyperparathyroidism; (5) On osteoporosis medications and need to assess response to drug therapy.   Last bone density test (DXA Scan): Not on file.  HIV Screening: covered annually if you're between the age of 15-65. Also covered annually if you are younger than 15 and older than 65 with risk factors for HIV infection. For pregnant patients, it is covered up to 3 times per  pregnancy.    Immunizations:  Immunization Recommendations   Influenza Vaccine Annual influenza vaccination during flu season is recommended for all persons aged >= 6 months who do not have contraindications   Pneumococcal Vaccine   * Pneumococcal conjugate vaccine = PCV13 (Prevnar 13), PCV15 (Vaxneuvance), PCV20 (Prevnar 20)  * Pneumococcal polysaccharide vaccine = PPSV23 (Pneumovax) Adults 19-63 yo with certain risk factors or if 65+ yo  If never received any pneumonia vaccine: recommend Prevnar 20 (PCV20)  Give PCV20 if previously received 1 dose of PCV13 or PPSV23   Hepatitis B Vaccine 3 dose series if at intermediate or high risk (ex: diabetes, end stage renal disease, liver disease)   Respiratory syncytial virus (RSV) Vaccine - COVERED BY MEDICARE PART D  * RSVPreF3 (Arexvy) CDC recommends that adults 60 years of age and older may receive a single dose of RSV vaccine using shared clinical decision-making (SCDM)   Tetanus (Td) Vaccine - COST NOT COVERED BY MEDICARE PART B Following completion of primary series, a booster dose should be given every 10 years to maintain immunity against tetanus. Td may also be given as tetanus wound prophylaxis.   Tdap Vaccine - COST NOT COVERED BY MEDICARE PART B Recommended at least once for all adults. For pregnant patients, recommended with each pregnancy.   Shingles Vaccine (Shingrix) - COST NOT COVERED BY MEDICARE PART B  2 shot series recommended in those 19 years and older who have or will have weakened immune systems or those 50 years and older     Health Maintenance Due:      Topic Date Due   • Breast Cancer Screening: Mammogram  09/23/2022   • Colorectal Cancer Screening  12/02/2033   • Hepatitis C Screening  Completed     Immunizations Due:      Topic Date Due   • Pneumococcal Vaccine: 65+ Years (1 of 1 - PCV) Never done   • Influenza Vaccine (1) 09/01/2024   • COVID-19 Vaccine (4 - 2024-25 season) 09/01/2024     Advance Directives   What are advance directives?   Advance directives are legal documents that state your wishes and plans for medical care. These plans are made ahead of time in case you lose your ability to make decisions for yourself. Advance directives can apply to any medical decision, such as the treatments you want, and if you want to donate organs.   What are the types of advance directives?  There are many types of advance directives, and each state has rules about how to use them. You may choose a combination of any of the following:  Living will:  This is a written record of the treatment you want. You can also choose which treatments you do not want, which to limit, and which to stop at a certain time. This includes surgery, medicine, IV fluid, and tube feedings.   Durable power of  for healthcare (DPAHC):  This is a written record that states who you want to make healthcare choices for you when you are unable to make them for yourself. This person, called a proxy, is usually a family member or a friend. You may choose more than 1 proxy.  Do not resuscitate (DNR) order:  A DNR order is used in case your heart stops beating or you stop breathing. It is a request not to have certain forms of treatment, such as CPR. A DNR order may be included in other types of advance directives.  Medical directive:  This covers the care that you want if you are in a coma, near death, or unable to make decisions for yourself. You can list the treatments you want for each condition. Treatment may include pain medicine, surgery, blood transfusions, dialysis, IV or tube feedings, and a ventilator (breathing machine).  Values history:  This document has questions about your views, beliefs, and how you feel and think about life. This information can help others choose the care that you would choose.  Why are advance directives important?  An advance directive helps you control your care. Although spoken wishes may be used, it is better to have your wishes written down.  Spoken wishes can be misunderstood, or not followed. Treatments may be given even if you do not want them. An advance directive may make it easier for your family to make difficult choices about your care.       © Copyright [employer] 2018 Information is for End User's use only and may not be sold, redistributed or otherwise used for commercial purposes. All illustrations and images included in CareNotes® are the copyrighted property of A.D.A.M., Inc. or [employer]

## 2025-04-29 ENCOUNTER — RESULTS FOLLOW-UP (OUTPATIENT)
Age: 65
End: 2025-04-29

## 2025-04-29 DIAGNOSIS — R73.09 ELEVATED HEMOGLOBIN A1C: Primary | ICD-10-CM

## 2025-04-29 DIAGNOSIS — E78.00 ELEVATED CHOLESTEROL: ICD-10-CM

## 2025-05-06 ENCOUNTER — TELEPHONE (OUTPATIENT)
Age: 65
End: 2025-05-06

## 2025-05-06 DIAGNOSIS — R00.2 PALPITATIONS: Primary | ICD-10-CM

## 2025-05-06 NOTE — TELEPHONE ENCOUNTER
Patient called to request a cardiologist referral.  She states she has been having heart palpitations. She said she has done the halter monitor and echo back in 2023.    Please advise, thank you.

## 2025-05-07 ENCOUNTER — APPOINTMENT (OUTPATIENT)
Dept: LAB | Facility: HOSPITAL | Age: 65
End: 2025-05-07
Attending: STUDENT IN AN ORGANIZED HEALTH CARE EDUCATION/TRAINING PROGRAM
Payer: COMMERCIAL

## 2025-05-07 DIAGNOSIS — E78.00 ELEVATED CHOLESTEROL: ICD-10-CM

## 2025-05-07 DIAGNOSIS — R73.09 ELEVATED HEMOGLOBIN A1C: ICD-10-CM

## 2025-05-07 LAB
ALBUMIN SERPL BCG-MCNC: 4.6 G/DL (ref 3.5–5)
ALP SERPL-CCNC: 94 U/L (ref 34–104)
ALT SERPL W P-5'-P-CCNC: 16 U/L (ref 7–52)
ANION GAP SERPL CALCULATED.3IONS-SCNC: 6 MMOL/L (ref 4–13)
AST SERPL W P-5'-P-CCNC: 18 U/L (ref 13–39)
BILIRUB SERPL-MCNC: 0.87 MG/DL (ref 0.2–1)
BUN SERPL-MCNC: 11 MG/DL (ref 5–25)
CALCIUM SERPL-MCNC: 9.9 MG/DL (ref 8.4–10.2)
CHLORIDE SERPL-SCNC: 106 MMOL/L (ref 96–108)
CHOLEST SERPL-MCNC: 217 MG/DL (ref ?–200)
CO2 SERPL-SCNC: 31 MMOL/L (ref 21–32)
CREAT SERPL-MCNC: 0.65 MG/DL (ref 0.6–1.3)
EST. AVERAGE GLUCOSE BLD GHB EST-MCNC: 111 MG/DL
GFR SERPL CREATININE-BSD FRML MDRD: 92 ML/MIN/1.73SQ M
GLUCOSE P FAST SERPL-MCNC: 99 MG/DL (ref 65–99)
HBA1C MFR BLD: 5.5 %
HDLC SERPL-MCNC: 84 MG/DL
LDLC SERPL CALC-MCNC: 121 MG/DL (ref 0–100)
NONHDLC SERPL-MCNC: 133 MG/DL
POTASSIUM SERPL-SCNC: 4.6 MMOL/L (ref 3.5–5.3)
PROT SERPL-MCNC: 7.2 G/DL (ref 6.4–8.4)
SODIUM SERPL-SCNC: 143 MMOL/L (ref 135–147)
TRIGL SERPL-MCNC: 59 MG/DL (ref ?–150)

## 2025-05-07 PROCEDURE — 80053 COMPREHEN METABOLIC PANEL: CPT

## 2025-05-07 PROCEDURE — 80061 LIPID PANEL: CPT

## 2025-05-07 PROCEDURE — 83036 HEMOGLOBIN GLYCOSYLATED A1C: CPT

## 2025-05-07 PROCEDURE — 36415 COLL VENOUS BLD VENIPUNCTURE: CPT

## 2025-05-08 ENCOUNTER — RESULTS FOLLOW-UP (OUTPATIENT)
Dept: FAMILY MEDICINE CLINIC | Facility: CLINIC | Age: 65
End: 2025-05-08

## 2025-06-17 ENCOUNTER — OFFICE VISIT (OUTPATIENT)
Dept: FAMILY MEDICINE CLINIC | Facility: CLINIC | Age: 65
End: 2025-06-17
Payer: COMMERCIAL

## 2025-06-17 VITALS
DIASTOLIC BLOOD PRESSURE: 72 MMHG | HEART RATE: 57 BPM | WEIGHT: 118.2 LBS | HEIGHT: 62 IN | BODY MASS INDEX: 21.75 KG/M2 | SYSTOLIC BLOOD PRESSURE: 124 MMHG | OXYGEN SATURATION: 99 %

## 2025-06-17 DIAGNOSIS — R10.13 EPIGASTRIC PAIN: Primary | ICD-10-CM

## 2025-06-17 PROCEDURE — G2211 COMPLEX E/M VISIT ADD ON: HCPCS | Performed by: STUDENT IN AN ORGANIZED HEALTH CARE EDUCATION/TRAINING PROGRAM

## 2025-06-17 PROCEDURE — 99214 OFFICE O/P EST MOD 30 MIN: CPT | Performed by: STUDENT IN AN ORGANIZED HEALTH CARE EDUCATION/TRAINING PROGRAM

## 2025-06-17 NOTE — PROGRESS NOTES
"Name: Debra Christiansen      : 1960      MRN: [id number]  Encounter Provider: Fiona Gaxiola MD  Encounter Date: 2025   Encounter department: [facility] [address]  [address]  :  Assessment & Plan  Epigastric pain  Broad differential, may be related to anxiety as does seem no worse in stressful situations and improves with hydroxyzine.  Orders:  •  US abdomen complete; Future           History of Present Illness   HPI    Epigastric pain that radiates to the back on both sides.  Is not tied to any food or eating, has noticed that it does worsen when she is anxious.  Is try not to use hydroxyzine but it does help with this pain and when she is anxious.  No nausea, vomiting, shortness of breath on exertion or chest pain.  No changes in bowel movement    Stopped a medications    Review of Systems   Constitutional:  Negative for activity change, appetite change, fatigue and fever.   HENT:  Negative for congestion, rhinorrhea and sore throat.    Eyes:  Negative for visual disturbance.   Respiratory:  Negative for cough and shortness of breath.    Cardiovascular:  Negative for chest pain.   Gastrointestinal:  Positive for abdominal pain. Negative for abdominal distention, anal bleeding, blood in stool, constipation, diarrhea, nausea, rectal pain and vomiting.       Objective   /72   Pulse 57   Ht 5' 2.25\" (1.581 m)   Wt 53.6 kg (118 lb 3.2 oz)   SpO2 99%   BMI 21.45 kg/m²      Physical Exam  Constitutional:       Appearance: Normal appearance.   HENT:      Head: Normocephalic and atraumatic.     Cardiovascular:      Rate and Rhythm: Normal rate and regular rhythm.      Heart sounds: Normal heart sounds.   Pulmonary:      Effort: Pulmonary effort is normal.      Breath sounds: Normal breath sounds.   Abdominal:      General: Abdomen is flat. Bowel sounds are normal.      Palpations: Abdomen is soft.      Tenderness: There is no abdominal tenderness.     Neurological:      " Mental Status: She is alert.

## 2025-06-20 ENCOUNTER — HOSPITAL ENCOUNTER (EMERGENCY)
Facility: HOSPITAL | Age: 65
Discharge: HOME | End: 2025-06-20
Payer: COMMERCIAL

## 2025-06-20 ENCOUNTER — APPOINTMENT (EMERGENCY)
Dept: RADIOLOGY | Facility: HOSPITAL | Age: 65
End: 2025-06-20
Payer: COMMERCIAL

## 2025-06-20 ENCOUNTER — APPOINTMENT (EMERGENCY)
Dept: CT IMAGING | Facility: HOSPITAL | Age: 65
End: 2025-06-20
Payer: COMMERCIAL

## 2025-06-20 VITALS
BODY MASS INDEX: 21.75 KG/M2 | WEIGHT: 118.17 LBS | OXYGEN SATURATION: 97 % | HEART RATE: 58 BPM | SYSTOLIC BLOOD PRESSURE: 183 MMHG | TEMPERATURE: 97.8 F | RESPIRATION RATE: 19 BRPM | HEIGHT: 62 IN | DIASTOLIC BLOOD PRESSURE: 81 MMHG

## 2025-06-20 DIAGNOSIS — R10.9 ABDOMINAL PAIN: Primary | ICD-10-CM

## 2025-06-20 LAB
ALBUMIN SERPL BCG-MCNC: 4.5 G/DL (ref 3.5–5)
ALP SERPL-CCNC: 87 U/L (ref 34–104)
ALT SERPL W P-5'-P-CCNC: 17 U/L (ref 7–52)
ANION GAP SERPL CALCULATED.3IONS-SCNC: 7 MMOL/L (ref 4–13)
AST SERPL W P-5'-P-CCNC: 23 U/L (ref 13–39)
BASOPHILS # BLD AUTO: 0.02 THOUSANDS/ÂΜL (ref 0–0.1)
BASOPHILS NFR BLD AUTO: 0 % (ref 0–1)
BILIRUB SERPL-MCNC: 0.52 MG/DL (ref 0.2–1)
BILIRUB UR QL STRIP: NEGATIVE
BUN SERPL-MCNC: 16 MG/DL (ref 5–25)
CALCIUM SERPL-MCNC: 9.8 MG/DL (ref 8.4–10.2)
CARDIAC TROPONIN I PNL SERPL HS: 4 NG/L (ref ?–50)
CHLORIDE SERPL-SCNC: 109 MMOL/L (ref 96–108)
CLARITY UR: CLEAR
CO2 SERPL-SCNC: 28 MMOL/L (ref 21–32)
COLOR UR: COLORLESS
CREAT SERPL-MCNC: 0.62 MG/DL (ref 0.6–1.3)
EOSINOPHIL # BLD AUTO: 0.04 THOUSAND/ÂΜL (ref 0–0.61)
EOSINOPHIL NFR BLD AUTO: 1 % (ref 0–6)
ERYTHROCYTE [DISTWIDTH] IN BLOOD BY AUTOMATED COUNT: 12.9 % (ref 11.6–15.1)
GFR SERPL CREATININE-BSD FRML MDRD: 94 ML/MIN/1.73SQ M
GLUCOSE SERPL-MCNC: 106 MG/DL (ref 65–140)
GLUCOSE UR STRIP-MCNC: NEGATIVE MG/DL
HCT VFR BLD AUTO: 42.1 % (ref 34.8–46.1)
HGB BLD-MCNC: 13.8 G/DL (ref 11.5–15.4)
HGB UR QL STRIP.AUTO: NEGATIVE
IMM GRANULOCYTES # BLD AUTO: 0.01 THOUSAND/UL (ref 0–0.2)
IMM GRANULOCYTES NFR BLD AUTO: 0 % (ref 0–2)
KETONES UR STRIP-MCNC: NEGATIVE MG/DL
LEUKOCYTE ESTERASE UR QL STRIP: NEGATIVE
LIPASE SERPL-CCNC: 28 U/L (ref 11–82)
LYMPHOCYTES # BLD AUTO: 1.84 THOUSANDS/ÂΜL (ref 0.6–4.47)
LYMPHOCYTES NFR BLD AUTO: 35 % (ref 14–44)
MCH RBC QN AUTO: 30.1 PG (ref 26.8–34.3)
MCHC RBC AUTO-ENTMCNC: 32.8 G/DL (ref 31.4–37.4)
MCV RBC AUTO: 92 FL (ref 82–98)
MONOCYTES # BLD AUTO: 0.26 THOUSAND/ÂΜL (ref 0.17–1.22)
MONOCYTES NFR BLD AUTO: 5 % (ref 4–12)
NEUTROPHILS # BLD AUTO: 3.13 THOUSANDS/ÂΜL (ref 1.85–7.62)
NEUTS SEG NFR BLD AUTO: 59 % (ref 43–75)
NITRITE UR QL STRIP: NEGATIVE
NRBC BLD AUTO-RTO: 0 /100 WBCS
PH UR STRIP.AUTO: 5 [PH]
PLATELET # BLD AUTO: 245 THOUSANDS/UL (ref 149–390)
PMV BLD AUTO: 9 FL (ref 8.9–12.7)
POTASSIUM SERPL-SCNC: 3.6 MMOL/L (ref 3.5–5.3)
PROT SERPL-MCNC: 7.4 G/DL (ref 6.4–8.4)
PROT UR STRIP-MCNC: NEGATIVE MG/DL
RBC # BLD AUTO: 4.59 MILLION/UL (ref 3.81–5.12)
SODIUM SERPL-SCNC: 144 MMOL/L (ref 135–147)
SP GR UR STRIP.AUTO: 1.01 (ref 1–1.03)
UROBILINOGEN UR STRIP-ACNC: <2 MG/DL
WBC # BLD AUTO: 5.3 THOUSAND/UL (ref 4.31–10.16)

## 2025-06-20 PROCEDURE — 93005 ELECTROCARDIOGRAM TRACING: CPT

## 2025-06-20 PROCEDURE — 81003 URINALYSIS AUTO W/O SCOPE: CPT

## 2025-06-20 PROCEDURE — 36415 COLL VENOUS BLD VENIPUNCTURE: CPT

## 2025-06-20 PROCEDURE — 99284 EMERGENCY DEPT VISIT MOD MDM: CPT

## 2025-06-20 PROCEDURE — 84484 ASSAY OF TROPONIN QUANT: CPT

## 2025-06-20 PROCEDURE — 80053 COMPREHEN METABOLIC PANEL: CPT

## 2025-06-20 PROCEDURE — 83690 ASSAY OF LIPASE: CPT

## 2025-06-20 PROCEDURE — 71046 X-RAY EXAM CHEST 2 VIEWS: CPT

## 2025-06-20 PROCEDURE — 96360 HYDRATION IV INFUSION INIT: CPT

## 2025-06-20 PROCEDURE — 74177 CT ABD & PELVIS W/CONTRAST: CPT

## 2025-06-20 PROCEDURE — 99285 EMERGENCY DEPT VISIT HI MDM: CPT

## 2025-06-20 PROCEDURE — 85025 COMPLETE CBC W/AUTO DIFF WBC: CPT

## 2025-06-20 RX ORDER — ACETAMINOPHEN 325 MG/1
650 TABLET ORAL ONCE
Status: COMPLETED | OUTPATIENT
Start: 2025-06-20 | End: 2025-06-20

## 2025-06-20 RX ORDER — ONDANSETRON 4 MG/1
4 TABLET, ORALLY DISINTEGRATING ORAL EVERY 6 HOURS PRN
Qty: 28 TABLET | Refills: 0 | Status: SHIPPED | OUTPATIENT
Start: 2025-06-20 | End: 2025-06-27

## 2025-06-20 RX ORDER — PANTOPRAZOLE SODIUM 20 MG/1
20 TABLET, DELAYED RELEASE ORAL DAILY
Qty: 30 TABLET | Refills: 0 | Status: SHIPPED | OUTPATIENT
Start: 2025-06-20 | End: 2025-07-20

## 2025-06-20 RX ADMIN — SODIUM CHLORIDE 1000 ML: 0.9 INJECTION, SOLUTION INTRAVENOUS at 20:17

## 2025-06-20 RX ADMIN — ACETAMINOPHEN 650 MG: 325 TABLET ORAL at 20:18

## 2025-06-20 RX ADMIN — IOHEXOL 85 ML: 350 INJECTION, SOLUTION INTRAVENOUS at 21:05

## 2025-06-21 LAB
ATRIAL RATE: 56 BPM
P AXIS: 71 DEGREES
PR INTERVAL: 154 MS
QRS AXIS: 44 DEGREES
QRSD INTERVAL: 86 MS
QT INTERVAL: 434 MS
QTC INTERVAL: 418 MS
T WAVE AXIS: 35 DEGREES
VENTRICULAR RATE: 56 BPM

## 2025-06-21 PROCEDURE — 93010 ELECTROCARDIOGRAM REPORT: CPT | Performed by: INTERNAL MEDICINE

## 2025-06-21 NOTE — DISCHARGE INSTRUCTIONS
Debra Елена was seen and evaluated today in the emergency department over your concern of epigastric abdominal pain.  The workup that we performed showed no acute cardiopulmonary abnormality, no acute intra-abdominal abnormality via.  Please return to the emergency department if you experience severe abdominal pain, chest pain, loss of consciousness or any other signs and symptoms that may be concerning to you.  Please follow-up with your primary care doctor within 1 week.  All questions were answered prior to discharge.  Thank you for choosing [facility] for your care.

## 2025-06-21 NOTE — ED PROVIDER NOTES
Time reflects when diagnosis was documented in both MDM as applicable and the Disposition within this note       Time User Action Codes Description Comment    6/20/2025 10:20 PM Jarrod Hernandez Add [R10.9] Abdominal pain           ED Disposition       ED Disposition   Discharge    Condition   Stable    Date/Time   Fri Jun 20, 2025 10:20 PM    Comment   Debra Christiansen discharge to home/self care.                   Assessment & Plan       Medical Decision Making      DDx: ACS, dysrhythmia, pancreatitis, cholecystitis, intra-abdominal processes, perforated viscus, gastric ulcers, gastritis, pancreatitis and other etiologies    Plan: X-ray, CT scan abdomen and pelvis, abdominal labs, troponin, ECG, symptomatic treatment and repeat evaluation    See ED course for further discussion    On repeat evaluation, symptoms have improved.  Blood work is overall unremarkable for acute intra abdominal, intrathoracic pathology.  Low risk heart score.  CT scan unremarkable.  Will have patient follow-up with outpatient PCP for repeat evaluation and further workup of abdominal pain.    Disposition: Discharged with instructions to obtain outpatient follow up of patient's symptoms and findings, with strict return precautions if patient develops new or worsening symptoms. Patient understands this plan and is agreeable. All questions answered. Patient discharged home with return precautions.      Amount and/or Complexity of Data Reviewed  Labs: ordered.  Radiology: ordered and independent interpretation performed. Decision-making details documented in ED Course.    Risk  OTC drugs.  Prescription drug management.        ED Course as of 06/20/25 2237 Fri Jun 20, 2025 1957 Blood Pressure(!): 183/81   1957 Temperature: 97.8 °F (36.6 °C)   1957 Temp Source: Temporal   1957 Pulse: 58   1957 Respirations: 19   1957 SpO2: 97 %   2050 XR chest 2 views  No acute cardiopulmonary abnormality     2158 CT abdomen pelvis with contrast  IMPRESSION:     No  acute findings in the abdomen or pelvis.        Workstation performed: [id number]            Medications   sodium chloride 0.9 % bolus 1,000 mL (0 mL Intravenous Stopped 6/20/25 2104)   acetaminophen (TYLENOL) tablet 650 mg (650 mg Oral Given 6/20/25 2018)   iohexol (OMNIPAQUE) 350 MG/ML injection (MULTI-DOSE) 85 mL (85 mL Intravenous Given 6/20/25 2105)       ED Risk Strat Scores   HEART Risk Score      Flowsheet Row Most Recent Value   Heart Score Risk Calculator    History 0 Filed at: 06/20/2025 2236   ECG 0 Filed at: 06/20/2025 2236   Age 2 Filed at: 06/20/2025 2236   Risk Factors 1 Filed at: 06/20/2025 2236   Troponin 0 Filed at: 06/20/2025 2236   HEART Score 3 Filed at: 06/20/2025 2236          HEART Risk Score      Flowsheet Row Most Recent Value   Heart Score Risk Calculator    History 0 Filed at: 06/20/2025 2236   ECG 0 Filed at: 06/20/2025 2236   Age 2 Filed at: 06/20/2025 2236   Risk Factors 1 Filed at: 06/20/2025 2236   Troponin 0 Filed at: 06/20/2025 2236   HEART Score 3 Filed at: 06/20/2025 2236                      No data recorded                            History of Present Illness       Chief Complaint   Patient presents with    Abdominal Pain     Pt c/o upper abdominal pain that wraps around to her back. Denies any injury. Pt has nausea and not able to go to the bathroom a lot.        Past Medical History[1]   Past Surgical History[2]   Family History[3]   Social History[4]   E-Cigarette/Vaping    E-Cigarette Use Never User       E-Cigarette/Vaping Substances    Nicotine No     THC No     CBD No     Flavoring No     Other No     Unknown No       I have reviewed and agree with the history as documented.     64-year-old female presents emergency department complaining of epigastric abdominal pain for the last few weeks.  She mentions recent evaluation at PCP for similar complaint.  Patient be scheduled abdominal ultrasound in the outpatient setting.  Patient mention she waited so long due to anxiety  and anxiety related to health care.  Nausea.  No vomiting.  She denies symptoms better or worsening after eating food.  She denies chest pain.  No shortness of breath.        Review of Systems   Gastrointestinal:  Positive for abdominal pain and nausea. Negative for vomiting.   All other systems reviewed and are negative.          Objective       ED Triage Vitals [06/20/25 1946]   Temperature Pulse Blood Pressure Respirations SpO2 Patient Position - Orthostatic VS   97.8 °F (36.6 °C) 58 (!) 183/81 19 97 % --      Temp Source Heart Rate Source BP Location FiO2 (%) Pain Score    Temporal Monitor Left arm -- --      Vitals      Date and Time Temp Pulse SpO2 Resp BP Pain Score FACES Pain Rating User   06/20/25 1946 97.8 °F (36.6 °C) 58 97 % 19 183/81 -- -- TO            Physical Exam  Vitals and nursing note reviewed.   Constitutional:       General: She is not in acute distress.     Appearance: She is well-developed.   HENT:      Head: Normocephalic and atraumatic.     Eyes:      Conjunctiva/sclera: Conjunctivae normal.       Cardiovascular:      Rate and Rhythm: Normal rate and regular rhythm.      Heart sounds: No murmur heard.  Pulmonary:      Effort: Pulmonary effort is normal. No respiratory distress.      Breath sounds: Normal breath sounds.   Abdominal:      Palpations: Abdomen is soft.      Tenderness: There is generalized abdominal tenderness and tenderness in the right upper quadrant, epigastric area and left upper quadrant.     Musculoskeletal:         General: No swelling.      Cervical back: Neck supple.     Skin:     General: Skin is warm and dry.      Capillary Refill: Capillary refill takes less than 2 seconds.     Neurological:      General: No focal deficit present.      Mental Status: She is alert and oriented to person, place, and time. Mental status is at baseline.      GCS: GCS eye subscore is 4. GCS verbal subscore is 5. GCS motor subscore is 6.      Cranial Nerves: No cranial nerve deficit.       Sensory: No sensory deficit.      Motor: No weakness.      Coordination: Coordination normal.      Gait: Gait normal.     Psychiatric:         Mood and Affect: Mood normal.         Results Reviewed       Procedure Component Value Units Date/Time    HS Troponin 0hr (reflex protocol) [[id number]]  (Normal) Collected: 06/20/25 2016    Lab Status: Final result Specimen: Blood from Arm, Right Updated: 06/20/25 2053     hs TnI 0hr 4 ng/L     Lipase [[id number]]  (Normal) Collected: 06/20/25 2016    Lab Status: Final result Specimen: Blood from Arm, Right Updated: 06/20/25 2046     Lipase 28 u/L     Comprehensive metabolic panel [[id number]]  (Abnormal) Collected: 06/20/25 2016    Lab Status: Final result Specimen: Blood from Arm, Right Updated: 06/20/25 2046     Sodium 144 mmol/L      Potassium 3.6 mmol/L      Chloride 109 mmol/L      CO2 28 mmol/L      ANION GAP 7 mmol/L      BUN 16 mg/dL      Creatinine 0.62 mg/dL      Glucose 106 mg/dL      Calcium 9.8 mg/dL      AST 23 U/L      ALT 17 U/L      Alkaline Phosphatase 87 U/L      Total Protein 7.4 g/dL      Albumin 4.5 g/dL      Total Bilirubin 0.52 mg/dL      eGFR 94 ml/min/1.73sq m     Narrative:      National Kidney Disease Foundation guidelines for Chronic Kidney Disease (CKD):     Stage 1 with normal or high GFR (GFR > 90 mL/min/1.73 square meters)    Stage 2 Mild CKD (GFR = 60-89 mL/min/1.73 square meters)    Stage 3A Moderate CKD (GFR = 45-59 mL/min/1.73 square meters)    Stage 3B Moderate CKD (GFR = 30-44 mL/min/1.73 square meters)    Stage 4 Severe CKD (GFR = 15-29 mL/min/1.73 square meters)    Stage 5 End Stage CKD (GFR <15 mL/min/1.73 square meters)  Note: GFR calculation is accurate only with a steady state creatinine    UA w Reflex to Microscopic w Reflex to Culture [[id number]] Collected: 06/20/25 2017    Lab Status: Final result Specimen: Urine, Other Updated: 06/20/25 2030     Color, UA Colorless     Clarity, UA Clear     Specific Gravity, UA 1.008     pH, UA  5.0     Leukocytes, UA Negative     Nitrite, UA Negative     Protein, UA Negative mg/dl      Glucose, UA Negative mg/dl      Ketones, UA Negative mg/dl      Urobilinogen, UA <2.0 mg/dl      Bilirubin, UA Negative     Occult Blood, UA Negative    CBC and differential [[id number]] Collected: 06/20/25 2016    Lab Status: Final result Specimen: Blood from Arm, Right Updated: 06/20/25 2026     WBC 5.30 Thousand/uL      RBC 4.59 Million/uL      Hemoglobin 13.8 g/dL      Hematocrit 42.1 %      MCV 92 fL      MCH 30.1 pg      MCHC 32.8 g/dL      RDW 12.9 %      MPV 9.0 fL      Platelets 245 Thousands/uL      nRBC 0 /100 WBCs      Segmented % 59 %      Immature Grans % 0 %      Lymphocytes % 35 %      Monocytes % 5 %      Eosinophils Relative 1 %      Basophils Relative 0 %      Absolute Neutrophils 3.13 Thousands/µL      Absolute Immature Grans 0.01 Thousand/uL      Absolute Lymphocytes 1.84 Thousands/µL      Absolute Monocytes 0.26 Thousand/µL      Eosinophils Absolute 0.04 Thousand/µL      Basophils Absolute 0.02 Thousands/µL             CT abdomen pelvis with contrast   Final Interpretation by Suma Perez MD (06/20 2137)      No acute findings in the abdomen or pelvis.         Workstation performed: [id number]         XR chest 2 views   ED Interpretation by Jarrod Hernandez DO (06/20 2050)   No acute cardiopulmonary abnormality          ECG 12 Lead Documentation Only    Date/Time: 6/20/2025 8:17 PM    Performed by: Jarrod Hernandez DO  Authorized by: Jarrod Hernandez DO    Indications / Diagnosis:  Epigastric pain  ECG reviewed by me, the ED Provider: yes    Patient location:  ED  Previous ECG:     Previous ECG:  Unavailable    Comparison to cardiac monitor: Yes    Interpretation:     Interpretation: normal    Rate:     ECG rate:  56    ECG rate assessment: bradycardic    Rhythm:     Rhythm: sinus bradycardia    Ectopy:     Ectopy: none    QRS:     QRS axis:  Normal    QRS intervals:  Normal  Conduction:      Conduction: normal    ST segments:     ST segments:  Normal  T waves:     T waves: inverted      Inverted:  V1, V2 and V3      ED Medication and Procedure Management   Prior to Admission Medications   Prescriptions Last Dose Informant Patient Reported? Taking?   Cholecalciferol (VITAMIN D3) 2000 units capsule  Self Yes No   Sig: Take 1 capsule by mouth in the morning.   Omega-3 Fatty Acids (FISH OIL) 1,000 mg  Self Yes No   Sig: Take 1,000 mg by mouth in the morning.   celecoxib (CeleBREX) 100 mg capsule   No No   Sig: Take 1 capsule (100 mg total) by mouth daily   hydrOXYzine HCL (ATARAX) 10 mg tablet   No No   Sig: TAKE 1 TABLET BY MOUTH EVERY 6 HOURS AS NEEDED FOR ANXIETY.      Facility-Administered Medications: None     Patient's Medications   Discharge Prescriptions    ONDANSETRON (ZOFRAN-ODT) 4 MG DISINTEGRATING TABLET    Take 1 tablet (4 mg total) by mouth every 6 (six) hours as needed for nausea or vomiting for up to 7 days       Start Date: 6/20/2025 End Date: 6/27/2025       Order Dose: 4 mg       Quantity: 28 tablet    Refills: 0    PANTOPRAZOLE (PROTONIX) 20 MG TABLET    Take 1 tablet (20 mg total) by mouth daily       Start Date: 6/20/2025 End Date: 7/20/2025       Order Dose: 20 mg       Quantity: 30 tablet    Refills: 0     No discharge procedures on file.  ED SEPSIS DOCUMENTATION   Time reflects when diagnosis was documented in both MDM as applicable and the Disposition within this note       Time User Action Codes Description Comment    6/20/2025 10:20 PM Jarrod Hernandez Add [R10.9] Abdominal pain                      [1]   Past Medical History:  Diagnosis Date    Anemia     Aortic valve disease     Breast cancer (HCC)     Cardiac murmur     Hyperlipidemia    [2]   Past Surgical History:  Procedure Laterality Date    BREAST SURGERY      lumpectomy    COLONOSCOPY      NV ESOPHAGOGASTRODUODENOSCOPY TRANSORAL DIAGNOSTIC N/A 2/19/2019    Procedure: ESOPHAGOGASTRODUODENOSCOPY (EGD);  Surgeon: All  MD Missy;  Location: [facility];  Service: Gastroenterology   [3]   Family History  Problem Relation Name Age of Onset    No Known Problems Mother      Hypertension Father     [4]   Social History  Tobacco Use    Smoking status: Never    Smokeless tobacco: Never   Vaping Use    Vaping status: Never Used   Substance Use Topics    Alcohol use: Not Currently     Comment: social    Drug use: No        Jarrod Hernandez,   06/20/25 [id number]

## 2025-06-23 DIAGNOSIS — F41.9 ANXIETY: ICD-10-CM

## 2025-06-23 NOTE — TELEPHONE ENCOUNTER
Pt stopped by the office asking for a 90 day supply of the  hydrOXYzine HCL (ATARAX) 10 mg tablet to be sent it to his pharmacy [facility] in [facility].     Pended for refill

## 2025-06-24 RX ORDER — HYDROXYZINE HYDROCHLORIDE 10 MG/1
10 TABLET, FILM COATED ORAL EVERY 6 HOURS PRN
Qty: 360 TABLET | Refills: 1 | Status: SHIPPED | OUTPATIENT
Start: 2025-06-24